# Patient Record
Sex: FEMALE | Race: WHITE | NOT HISPANIC OR LATINO | Employment: OTHER | URBAN - NONMETROPOLITAN AREA
[De-identification: names, ages, dates, MRNs, and addresses within clinical notes are randomized per-mention and may not be internally consistent; named-entity substitution may affect disease eponyms.]

---

## 2017-03-20 DIAGNOSIS — Z12.11 ENCOUNTER FOR SCREENING FOR MALIGNANT NEOPLASM OF COLON: ICD-10-CM

## 2017-05-01 ENCOUNTER — APPOINTMENT (OUTPATIENT)
Dept: LAB | Facility: MEDICAL CENTER | Age: 57
End: 2017-05-01
Payer: COMMERCIAL

## 2017-05-01 ENCOUNTER — TRANSCRIBE ORDERS (OUTPATIENT)
Dept: RADIOLOGY | Facility: MEDICAL CENTER | Age: 57
End: 2017-05-01

## 2017-05-01 DIAGNOSIS — E78.5 HYPERLIPIDEMIA: ICD-10-CM

## 2017-05-01 DIAGNOSIS — F41.1 GENERALIZED ANXIETY DISORDER: ICD-10-CM

## 2017-05-01 LAB
ALBUMIN SERPL BCP-MCNC: 3.7 G/DL (ref 3.5–5)
ALP SERPL-CCNC: 102 U/L (ref 46–116)
ALT SERPL W P-5'-P-CCNC: 21 U/L (ref 12–78)
ANION GAP SERPL CALCULATED.3IONS-SCNC: 8 MMOL/L (ref 4–13)
AST SERPL W P-5'-P-CCNC: 23 U/L (ref 5–45)
BASOPHILS # BLD AUTO: 0.03 THOUSANDS/ΜL (ref 0–0.1)
BASOPHILS NFR BLD AUTO: 1 % (ref 0–1)
BILIRUB SERPL-MCNC: 0.49 MG/DL (ref 0.2–1)
BUN SERPL-MCNC: 16 MG/DL (ref 5–25)
CALCIUM SERPL-MCNC: 9.3 MG/DL (ref 8.3–10.1)
CHLORIDE SERPL-SCNC: 108 MMOL/L (ref 100–108)
CHOLEST SERPL-MCNC: 228 MG/DL (ref 50–200)
CO2 SERPL-SCNC: 26 MMOL/L (ref 21–32)
CREAT SERPL-MCNC: 0.71 MG/DL (ref 0.6–1.3)
EOSINOPHIL # BLD AUTO: 0.13 THOUSAND/ΜL (ref 0–0.61)
EOSINOPHIL NFR BLD AUTO: 2 % (ref 0–6)
ERYTHROCYTE [DISTWIDTH] IN BLOOD BY AUTOMATED COUNT: 12.9 % (ref 11.6–15.1)
GFR SERPL CREATININE-BSD FRML MDRD: >60 ML/MIN/1.73SQ M
GLUCOSE P FAST SERPL-MCNC: 93 MG/DL (ref 65–99)
HCT VFR BLD AUTO: 43.8 % (ref 34.8–46.1)
HDLC SERPL-MCNC: 67 MG/DL (ref 40–60)
HGB BLD-MCNC: 14.9 G/DL (ref 11.5–15.4)
LDLC SERPL CALC-MCNC: 142 MG/DL (ref 0–100)
LYMPHOCYTES # BLD AUTO: 1.93 THOUSANDS/ΜL (ref 0.6–4.47)
LYMPHOCYTES NFR BLD AUTO: 32 % (ref 14–44)
MCH RBC QN AUTO: 31.2 PG (ref 26.8–34.3)
MCHC RBC AUTO-ENTMCNC: 34 G/DL (ref 31.4–37.4)
MCV RBC AUTO: 92 FL (ref 82–98)
MONOCYTES # BLD AUTO: 0.6 THOUSAND/ΜL (ref 0.17–1.22)
MONOCYTES NFR BLD AUTO: 10 % (ref 4–12)
NEUTROPHILS # BLD AUTO: 3.25 THOUSANDS/ΜL (ref 1.85–7.62)
NEUTS SEG NFR BLD AUTO: 55 % (ref 43–75)
NRBC BLD AUTO-RTO: 0 /100 WBCS
PLATELET # BLD AUTO: 309 THOUSANDS/UL (ref 149–390)
PMV BLD AUTO: 9.8 FL (ref 8.9–12.7)
POTASSIUM SERPL-SCNC: 4 MMOL/L (ref 3.5–5.3)
PROT SERPL-MCNC: 7.7 G/DL (ref 6.4–8.2)
RBC # BLD AUTO: 4.77 MILLION/UL (ref 3.81–5.12)
SODIUM SERPL-SCNC: 142 MMOL/L (ref 136–145)
TRIGL SERPL-MCNC: 95 MG/DL
TSH SERPL DL<=0.05 MIU/L-ACNC: 1.62 UIU/ML (ref 0.36–3.74)
WBC # BLD AUTO: 5.96 THOUSAND/UL (ref 4.31–10.16)

## 2017-05-01 PROCEDURE — 85025 COMPLETE CBC W/AUTO DIFF WBC: CPT

## 2017-05-01 PROCEDURE — 36415 COLL VENOUS BLD VENIPUNCTURE: CPT

## 2017-05-01 PROCEDURE — 84443 ASSAY THYROID STIM HORMONE: CPT

## 2017-05-01 PROCEDURE — 80053 COMPREHEN METABOLIC PANEL: CPT

## 2017-05-01 PROCEDURE — 80061 LIPID PANEL: CPT

## 2017-05-02 ENCOUNTER — GENERIC CONVERSION - ENCOUNTER (OUTPATIENT)
Dept: OTHER | Facility: OTHER | Age: 57
End: 2017-05-02

## 2017-05-22 ENCOUNTER — ALLSCRIPTS OFFICE VISIT (OUTPATIENT)
Dept: OTHER | Facility: OTHER | Age: 57
End: 2017-05-22

## 2017-08-14 ENCOUNTER — ALLSCRIPTS OFFICE VISIT (OUTPATIENT)
Dept: OTHER | Facility: OTHER | Age: 57
End: 2017-08-14

## 2017-08-14 DIAGNOSIS — R35.0 FREQUENCY OF MICTURITION: ICD-10-CM

## 2017-08-14 DIAGNOSIS — Z12.31 ENCOUNTER FOR SCREENING MAMMOGRAM FOR MALIGNANT NEOPLASM OF BREAST: ICD-10-CM

## 2017-08-14 DIAGNOSIS — N39.3 STRESS INCONTINENCE: ICD-10-CM

## 2017-08-15 ENCOUNTER — APPOINTMENT (OUTPATIENT)
Dept: LAB | Facility: MEDICAL CENTER | Age: 57
End: 2017-08-15
Payer: COMMERCIAL

## 2017-08-15 ENCOUNTER — GENERIC CONVERSION - ENCOUNTER (OUTPATIENT)
Dept: OTHER | Facility: OTHER | Age: 57
End: 2017-08-15

## 2017-08-15 ENCOUNTER — TRANSCRIBE ORDERS (OUTPATIENT)
Dept: LAB | Facility: MEDICAL CENTER | Age: 57
End: 2017-08-15

## 2017-08-15 DIAGNOSIS — N39.3 STRESS INCONTINENCE: ICD-10-CM

## 2017-08-15 DIAGNOSIS — R35.0 FREQUENCY OF MICTURITION: ICD-10-CM

## 2017-08-15 LAB
BACTERIA UR QL AUTO: ABNORMAL /HPF
BILIRUB UR QL STRIP: NEGATIVE
CAOX CRY URNS QL MICRO: ABNORMAL /HPF
CLARITY UR: CLEAR
COLOR UR: YELLOW
GLUCOSE UR STRIP-MCNC: NEGATIVE MG/DL
HGB UR QL STRIP.AUTO: NEGATIVE
KETONES UR STRIP-MCNC: NEGATIVE MG/DL
LEUKOCYTE ESTERASE UR QL STRIP: NEGATIVE
NITRITE UR QL STRIP: NEGATIVE
NON-SQ EPI CELLS URNS QL MICRO: ABNORMAL /HPF
PH UR STRIP.AUTO: 5.5 [PH] (ref 4.5–8)
PROT UR STRIP-MCNC: NEGATIVE MG/DL
RBC #/AREA URNS AUTO: ABNORMAL /HPF
SP GR UR STRIP.AUTO: 1.03 (ref 1–1.03)
UROBILINOGEN UR QL STRIP.AUTO: 0.2 E.U./DL
WBC #/AREA URNS AUTO: ABNORMAL /HPF

## 2017-08-15 PROCEDURE — 81001 URINALYSIS AUTO W/SCOPE: CPT

## 2017-08-15 PROCEDURE — 87086 URINE CULTURE/COLONY COUNT: CPT

## 2017-08-16 LAB — BACTERIA UR CULT: NORMAL

## 2017-08-18 ENCOUNTER — GENERIC CONVERSION - ENCOUNTER (OUTPATIENT)
Dept: OTHER | Facility: OTHER | Age: 57
End: 2017-08-18

## 2017-08-25 ENCOUNTER — HOSPITAL ENCOUNTER (OUTPATIENT)
Dept: MAMMOGRAPHY | Facility: HOSPITAL | Age: 57
Discharge: HOME | End: 2017-08-25
Attending: OBSTETRICS & GYNECOLOGY
Payer: COMMERCIAL

## 2017-08-25 DIAGNOSIS — Z12.31 ENCOUNTER FOR SCREENING MAMMOGRAM FOR MALIGNANT NEOPLASM OF BREAST: ICD-10-CM

## 2017-08-25 PROCEDURE — 77063 BREAST TOMOSYNTHESIS BI: CPT

## 2017-08-25 PROCEDURE — G0202 SCR MAMMO BI INCL CAD: HCPCS

## 2017-11-06 ENCOUNTER — ALLSCRIPTS OFFICE VISIT (OUTPATIENT)
Dept: OTHER | Facility: OTHER | Age: 57
End: 2017-11-06

## 2017-11-07 NOTE — PROGRESS NOTES
Assessment  1  Never a smoker   2  Hyperlipidemia (272 4) (E78 5)   3  Generalized anxiety disorder (300 02) (F41 1)    Plan  Generalized anxiety disorder, Hyperlipidemia    · Follow-up visit in 6 months Evaluation and Treatment  Follow-up  Status: Hold For -  Scheduling  Requested for: [id number]   · (1) CBC/PLT/DIFF; Status:Active; Requested for:06Mar2018;    · (1) COMPREHENSIVE METABOLIC PANEL; Status:Active; Requested for:06Mar2018;    · (1) LIPID PANEL, FASTING; Status:Active; Requested for:06Mar2018;    · (1) TSH; Status:Active; Requested for:06Mar2018;   Hyperlipidemia    · Lovastatin 40 MG Oral Tablet; TAKE 1 TABLET EVERY EVENING  Need for prophylactic vaccination and inoculation against influenza    · Fluzone Quadrivalent 0 5 ML Intramuscular Suspension Prefilled Syringe;  INJECT 0 5  ML Intramuscular; To Be Done: [id number]  Screening for colon cancer    · COLONOSCOPY; Status:Temporary Deferral - Pt requests deferral,pt is due and is going  to set up her appt;    11/6/2018    Discussion/Summary    Will get Flu shot today  Encouraged to exercise more  Blood work ordered prior to next visit  F/U in 6 months or PRN  Possible side effects of new medications were reviewed with the patient/guardian today  The treatment plan was reviewed with the patient/guardian  The patient/guardian understands and agrees with the treatment plan      Chief Complaint  Follow-up   Patient is here today for follow up of chronic conditions described in HPI  History of Present Illness  Follow-up  Patient denies any chest pain or shortness of breath  The patient is being seen for follow-up of generalized anxiety disorder  The patient reports doing well  She has no comorbid illnesses  She has had no significant interval events  The patient is currently asymptomatic  The patient is not currently on medication for this problem  The patient states her hyperlipidemia has been stable since the last visit   She has no comorbid illnesses  She has no significant interval events  Symptoms: The patient is currently asymptomatic  Medications: the patient is adherent with her medication regimen  -- She denies medication side effects  Review of Systems    Constitutional: No fever, no chills, feels well, no tiredness, no recent weight gain or weight loss  Cardiovascular: No complaints of slow heart rate, no fast heart rate, no chest pain, no palpitations, no leg claudication, no lower extremity edema  Respiratory: No complaints of shortness of breath, no wheezing, no cough, no SOB on exertion, no orthopnea, no PND  Gastrointestinal: No complaints of abdominal pain, no constipation, no nausea or vomiting, no diarrhea, no bloody stools  Genitourinary: No complaints of dysuria, no incontinence, no pelvic pain, no dysmenorrhea, no vaginal discharge or bleeding  Active Problems  1  Acute upper respiratory infection (465 9) (J06 9)   2  Bladder stones (594 1) (N21 0)   3  Encounter for routine gynecological examination (V72 31) (Z01 419)   4  Encounter for screening mammogram for malignant neoplasm of breast (V76 12)   (Z12 31)   5  Fatigue (780 79) (R53 83)   6  Generalized anxiety disorder (300 02) (F41 1)   7  Hyperlipidemia (272 4) (E78 5)   8  Impacted cerumen of right ear (380 4) (H61 21)   9  Increased BMI (783 9) (R63 8)   10  Left flank pain (789 09) (R10 9)   11  Need for prophylactic vaccination and inoculation against influenza (V04 81) (Z23)   12  Post-menopausal bleeding (627 1) (N95 0)   13  Screening for colon cancer (V76 51) (Z12 11)   14  Screening for depression (V79 0) (Z13 89)   15  Snoring (786 09) (R06 83)   16  Ureteral calculus, right (592 1) (N20 1)   17  Urinary frequency (788 41) (R35 0)   18  Urinary tract infection (599 0) (N39 0)   19  Urinary, incontinence, stress female (625 6) (N39 3)   20  Visit for routine gyn exam (V72 31) (Z01 419)    Past Medical History  1   Acute upper respiratory infection (465 9) (J06 9)   2  Acute upper respiratory infection (465 9) (J06 9)   3  History of Dysuria (788 1) (R30 0)   4  History of Encounter for screening mammogram for malignant neoplasm of breast   (V76 12) (Z12 31)   5  History of acute sinusitis (V12 69) (Z87 09)    The active problems and past medical history were reviewed and updated today  Surgical History  1  History of Gallbladder Surgery    The surgical history was reviewed and updated today  Family History  Mother    1  Family history of Stroke Syndrome (436)  Maternal Grandmother    2  Family history of malignant neoplasm (V16 9) (Z80 9)    The family history was reviewed and updated today  Social History   · Being A Social Drinker   ·    · Never a smoker  The social history was reviewed and updated today  The social history was reviewed and is unchanged  Current Meds   1  Lovastatin 40 MG Oral Tablet; TAKE 1 TABLET EVERY EVENING; Therapy: 10YSG9836 to (Evaluate:34Ghq1129)  Requested for: 99HBF3437; Last   Rx:03Oct2016 Ordered   2  Vitamin C TABS; Therapy: (Recorded:12Oct2015) to Recorded    The medication list was reviewed and updated today  Allergies  1  Penicillins    Vitals  Vital Signs    Recorded: 16JXJ4140 17:49QX   Systolic 515   Diastolic 80   Height 5 ft 1 in   Weight 158 lb    BMI Calculated 29 85   BSA Calculated 1 71     Physical Exam    Constitutional   General appearance: No acute distress, well appearing and well nourished  Pulmonary   Respiratory effort: No increased work of breathing or signs of respiratory distress  Auscultation of lungs: Clear to auscultation  Cardiovascular   Auscultation of heart: Normal rate and rhythm, normal S1 and S2, without murmurs      Examination of extremities for edema and/or varicosities: Normal     Psychiatric   Orientation to person, place, and time: Normal     Mood and affect: Normal          Signatures   Electronically signed by : Kumar Rivera DO; Nov 6 2017  9:26AM EST                       (Author)

## 2018-01-10 NOTE — RESULT NOTES
Verified Results  (1) COMPREHENSIVE METABOLIC PANEL 17HVZ9582 32:02XP [employer] Order Number: [id number]     Test Name Result Flag Reference   SODIUM 142 mmol/L  136-145   POTASSIUM 4 0 mmol/L  3 5-5 3   CHLORIDE 108 mmol/L  100-108   CARBON DIOXIDE 26 mmol/L  21-32   ANION GAP (CALC) 8 mmol/L  4-13   BLOOD UREA NITROGEN 16 mg/dL  5-25   CREATININE 0 71 mg/dL  0 60-1 30   Standardized to IDMS reference method   CALCIUM 9 3 mg/dL  8 3-10 1   BILI, TOTAL 0 49 mg/dL  0 20-1 00   ALK PHOSPHATAS 102 U/L     ALT (SGPT) 21 U/L  12-78   AST(SGOT) 23 U/L  5-45   ALBUMIN 3 7 g/dL  3 5-5 0   TOTAL PROTEIN 7 7 g/dL  6 4-8 2   eGFR Non-African American      >60 0 ml/min/1 73sq m   [facility] Disease Education Program recommendations are as follows:  GFR calculation is accurate only with a steady state creatinine  Chronic Kidney disease less than 60 ml/min/1 73 sq  meters  Kidney failure less than 15 ml/min/1 73 sq  meters  GLUCOSE FASTING 93 mg/dL  65-99     (1) CBC/PLT/DIFF 14OUH1678 09:59AM [employer] Order Number: [id number]     Test Name Result Flag Reference   WBC COUNT 5 96 Thousand/uL  4 31-10 16   RBC COUNT 4 77 Million/uL  3 81-5 12   HEMOGLOBIN 14 9 g/dL  11 5-15 4   HEMATOCRIT 43 8 %  34 8-46  1   MCV 92 fL  82-98   MCH 31 2 pg  26 8-34 3   MCHC 34 0 g/dL  31 4-37 4   RDW 12 9 %  11 6-15 1   MPV 9 8 fL  8 9-12 7   PLATELET COUNT 479 Thousands/uL  149-390   nRBC AUTOMATED 0 /100 WBCs     NEUTROPHILS RELATIVE PERCENT 55 %  43-75   LYMPHOCYTES RELATIVE PERCENT 32 %  14-44   MONOCYTES RELATIVE PERCENT 10 %  4-12   EOSINOPHILS RELATIVE PERCENT 2 %  0-6   BASOPHILS RELATIVE PERCENT 1 %  0-1   NEUTROPHILS ABSOLUTE COUNT 3 25 Thousands/? ??L  1 85-7 62   LYMPHOCYTES ABSOLUTE COUNT 1 93 Thousands/? ??L  0 60-4 47   MONOCYTES ABSOLUTE COUNT 0 60 Thousand/? ??L  0 17-1 22   EOSINOPHILS ABSOLUTE COUNT 0 13 Thousand/? ??L  0 00-0 61   BASOPHILS ABSOLUTE COUNT 0 03 Thousands/? ? ? L 0  00-0 10   - Patient Instructions: This bloodwork is non-fasting  Please drink two glasses of water morning of bloodwork  (1) LIPID PANEL, FASTING [id number] 09:59AM Silvano Montoya Order Number: [id number]     Test Name Result Flag Reference   CHOLESTEROL 228 mg/dL H    HDL,DIRECT 67 mg/dL H 40-60   Specimen collection should occur prior to Metamizole administration due to the potential for falsely depressed results  LDL CHOLESTEROL CALCULATED 142 mg/dL H 0-100   - Patient Instructions: This is a fasting blood test  Water,black tea or black  coffee only after 9:00pm the night before test   Drink 2 glasses of water the morning of test       Triglyceride:         Normal              <150 mg/dl       Borderline High    150-199 mg/dl       High               200-499 mg/dl       Very High          >499 mg/dl  Cholesterol:         Desirable        <200 mg/dl      Borderline High  200-239 mg/dl      High             >239 mg/dl  HDL Cholesterol:        High    >59 mg/dL      Low     <41 mg/dL  LDL CALCULATED:    This screening LDL is a calculated result  It does not have the accuracy of the Direct Measured LDL in the monitoring of patients with hyperlipidemia and/or statin therapy  Direct Measure LDL (QPX584) must be ordered separately in these patients  TRIGLYCERIDES 95 mg/dL  <=150   Specimen collection should occur prior to N-Acetylcysteine or Metamizole administration due to the potential for falsely depressed results  (1) TSH 11WML8464 09:59AM Silvano Montoya Order Number: [id number]     Test Name Result Flag Reference   TSH 1 620 uIU/mL  0 358-3 740   - Patient Instructions: This bloodwork is non-fasting  Please drink two glasses of water morning of bloodwork  Patients undergoing fluorescein dye angiography may retain small amounts of fluorescein in the body for 48-72 hours post procedure   Samples containing fluorescein can produce falsely depressed TSH values  If the patient had this procedure,a specimen should be resubmitted post fluorescein clearance            The recommended reference ranges for TSH during pregnancy are as follows:  First trimester 0 1 to 2 5 uIU/mL  Second trimester  0 2 to 3 0 uIU/mL  Third trimester 0 3 to 3 0 uIU/m

## 2018-01-11 NOTE — PROGRESS NOTES
Assessment    1  Encounter for preventive health examination (V70 0) (Z00 00)    Plan  Acute upper respiratory infection    · Azithromycin 250 MG Oral Tablet; TAKE 2 TABLETS ON DAY 1 THEN TAKE 1  TABLET A DAY FOR 4 DAYS  Hyperlipidemia    · Follow-up visit in 6 months Evaluation and Treatment  Follow-up  Status: Hold For -  Scheduling  Requested for: 81KBJ5785    Discussion/Summary  health maintenance visit Currently, she eats a healthy diet  Breast cancer screening: mammogram is current  Colorectal cancer screening: colorectal cancer screening is current  Advice and education were given regarding nutrition and aerobic exercise  Patient discussion: discussed with the patient  Continue same medications  Follow-up in 6 months or when necessary  Reviewed her blood work  Chief Complaint  Well visit      History of Present Illness  HM, Adult Female: The patient is being seen for a health maintenance evaluation  General Health: The patient's health since the last visit is described as good  She has regular dental visits  She complains of vision problems  Vision care includes wearing glasses  She denies hearing loss  Immunizations status: up to date  Lifestyle:  She consumes a diverse and healthy diet  She does not have any weight concerns  She exercises regularly  She does not use tobacco  She denies alcohol use  She denies drug use  Screening:   HPI: Well visit  Denies any chest pain shortness of breath  Review of Systems    Constitutional: No fever, no chills, feels well, no tiredness, no recent weight gain or weight loss  Cardiovascular: No complaints of slow heart rate, no fast heart rate, no chest pain, no palpitations, no leg claudication, no lower extremity edema  Respiratory: No complaints of shortness of breath, no wheezing, no cough, no SOB on exertion, no orthopnea, no PND     Gastrointestinal: No complaints of abdominal pain, no constipation, no nausea or vomiting, no diarrhea, no bloody stools  Genitourinary: No complaints of dysuria, no incontinence, no pelvic pain, no dysmenorrhea, no vaginal discharge or bleeding  Active Problems    1  Acute upper respiratory infection (465 9) (J06 9)   2  Bladder stones (594 1) (N21 0)   3  Generalized anxiety disorder (300 02) (F41 1)   4  Hyperlipidemia (272 4) (E78 5)   5  Impacted cerumen of right ear (380 4) (H61 21)   6  Need for prophylactic vaccination and inoculation against influenza (V04 81) (Z23)   7  Post-menopausal bleeding (627 1) (N95 0)   8  Screening for depression (V79 0) (Z13 89)   9  Ureteral calculus, right (592 1) (N20 1)   10  Urinary tract infection (599 0) (N39 0)   11  Urinary, incontinence, stress female (625 6) (N39 3)   12  Visit for routine gyn exam (V72 31) (Z01 419)    Past Medical History    · Acute upper respiratory infection (465 9) (J06 9)   · Acute upper respiratory infection (465 9) (J06 9)   · History of Dysuria (788 1) (R30 0)   · History of Encounter for routine gynecological examination (V72 31) (Z01 419)   · History of Encounter for screening mammogram for malignant neoplasm of breast  (V76 12) (Z12 31)   · History of acute sinusitis (V12 69) (Z87 09)    Surgical History    · History of Gallbladder Surgery    Family History  Mother    · Family history of Stroke Syndrome (56)  Maternal Grandmother    · Family history of malignant neoplasm (V16 9) (Z80 9)    Social History    · Being A Social Drinker   ·    · Never A Smoker    Current Meds   1  Lovastatin 40 MG Oral Tablet; TAKE 1 TABLET EVERY EVENING; Therapy: 16SIC7681 to (Evaluate:82Qgd8373)  Requested for: 23Zsg5273; Last   Rx:13Zbp7415 Ordered   2  Vitamin C TABS; Therapy: (Recorded:12Oct2015) to Recorded    Allergies    1   Penicillins    Vitals   Recorded: 09RCJ0745 59:57GH   Systolic 168, LUE, Sitting   Diastolic 70, LUE, Sitting   Height 5 ft 1 in   Weight 148 lb    BMI Calculated 27 96   BSA Calculated 1 66     Physical Exam    Constitutional   General appearance: No acute distress, well appearing and well nourished  Pulmonary   Respiratory effort: No increased work of breathing or signs of respiratory distress  Auscultation of lungs: Clear to auscultation  Cardiovascular   Auscultation of heart: Normal rate and rhythm, normal S1 and S2, no murmurs      Examination of extremities for edema and/or varicosities: Normal     Psychiatric   Orientation to person, place, and time: Normal     Mood and affect: Normal        Signatures   Electronically signed by : Yamileth Grimm DO; May 10 2016  9:32AM EST                       (Author)

## 2018-01-12 VITALS
BODY MASS INDEX: 29.83 KG/M2 | HEIGHT: 61 IN | DIASTOLIC BLOOD PRESSURE: 80 MMHG | WEIGHT: 158 LBS | SYSTOLIC BLOOD PRESSURE: 132 MMHG

## 2018-01-12 NOTE — RESULT NOTES
Verified Results  (1) URINALYSIS WITH MICROSCOPIC 77Igv4450 09:42AM Abraham Anderson   TW Order Number: [id number]     Test Name Result Flag Reference   COLOR Yellow     CLARITY Clear     PH UA 5 5  4 5-8 0   LEUKOCYTE ESTERASE UA Negative  Negative   NITRITE UA Negative  Negative   PROTEIN UA Negative mg/dl  Negative   GLUCOSE UA Negative mg/dl  Negative   KETONES UA Negative mg/dl  Negative   UROBILINOGEN UA 0 2 E U /dl  0 2, 1 0 E U /dl   BILIRUBIN UA Negative  Negative   BLOOD UA Negative  Negative   SPECIFIC GRAVITY UA 1 026  1 003-1 030   WBC UA None Seen /hpf  None Seen   RBC UA None Seen /hpf  None Seen   BACTERIA None Seen /hpf  None Seen, Occasional   CALCIUM OXALATE CRYSTALS /HPF IN URINE SEDIMENT BY MICROSCOPY Occasional /hpf A None Seen   EPITHELIAL CELLS None Seen /hpf  None Seen, Occasional     (1) URINE CULTURE [id number] 09:42AM Robsonrodrick [facility]ons Order Number: [id number]     Test Name Result Flag Reference   CLINICAL REPORT (Report)     Test:        Urine culture  Specimen Type:   Urine  Specimen Date:   8/15/2017 9:42 AM  Result Date:    8/16/2017 1:07 PM  Result Status:   Final result  Resulting Lab:    [address]            Tel: [phone]      CULTURE                                       ------------------                                   No Growth <1000 cfu/mL

## 2018-01-12 NOTE — RESULT NOTES
Verified Results  CT RENAL STONE STUDY ABDOMEN PELVIS WO CONTRAST [id number] 01:55PM Regine Norton Order Number: [id number]   Performing Comments: Kidney stone protocol   - Patient Instructions: To schedule this appointment, please contact Central Scheduling at [phone]   Order Number: [id number]   Performing Comments: Kidney stone protocol   - Patient Instructions: To schedule this appointment, please contact Central Scheduling at [phone]  Test Name Result Flag Reference   CT RENAL STONE STUDY ABDOMEN PELVIS WO CONTRAST (Report)     CT ABDOMEN AND PELVIS WITHOUT IV CONTRAST - LOW DOSE RENAL STONE      INDICATION: Left-sided pain this morning, history of kidney stone      COMPARISON: CT abdomen and pelvis 9/8/2015     TECHNIQUE: Low dose thin section CT examination of the abdomen and pelvis was performed without intravenous or oral contrast according to a protocol specifically designed to evaluate for urinary tract calculus  Axial, sagittal and coronal reformatted    images were submitted for interpretation  This examination, like all CT scans performed in the [facility], was performed utilizing techniques to minimize radiation dose exposure, including the use of iterative reconstruction and    automated exposure control  Evaluation for pathology in the abdomen and pelvis that is unrelated to urinary tract calculi is limited  FINDINGS:     RIGHT KIDNEY AND URETER:   No urinary tract calculi  No hydronephrosis or hydroureter  No perinephric collection  LEFT KIDNEY AND URETER:   No urinary tract calculi  No hydronephrosis or hydroureter  No perinephric collection  URINARY BLADDER:   Unremarkable  There is bibasilar dependent atelectasis  Limited low radiation dose noncontrast CT evaluation demonstrates no clinically significant abnormality of liver, spleen, pancreas, or adrenal glands     No calcified gallstones or gallbladder wall thickening noted    No bowel obstruction  No ascites or lymphadenopathy  Mild stool retention  Limited evaluation demonstrates no evidence to suggest acute appendicitis  No acute fracture or destructive osseous lesion is identified  IMPRESSION:     No obstructive uropathy           Workstation performed: [id number]     Signed by:   Ramone Ghosh MD   7/21/16

## 2018-01-13 VITALS
BODY MASS INDEX: 34.55 KG/M2 | DIASTOLIC BLOOD PRESSURE: 84 MMHG | WEIGHT: 183 LBS | HEIGHT: 61 IN | SYSTOLIC BLOOD PRESSURE: 120 MMHG

## 2018-01-14 VITALS
SYSTOLIC BLOOD PRESSURE: 118 MMHG | DIASTOLIC BLOOD PRESSURE: 80 MMHG | HEIGHT: 61 IN | WEIGHT: 152.44 LBS | BODY MASS INDEX: 28.78 KG/M2

## 2018-01-16 NOTE — RESULT NOTES
Verified Results  (1) CBC/PLT/DIFF [id number] 08:04AM Albertina Mondragon     Test Name Result Flag Reference   WBC COUNT 7 17 Thousand/uL  4 31-10 16   RBC COUNT 5 09 Million/uL  3 81-5 12   HEMOGLOBIN 15 4 g/dL  11 5-15 4   HEMATOCRIT 45 6 %  34 8-46  1   MCV 90 fL  82-98   MCH 30 3 pg  26 8-34 3   MCHC 33 8 g/dL  31 4-37 4   RDW 13 1 %  11 6-15 1   MPV 9 5 fL  8 9-12 7   PLATELET COUNT 309 Thousands/uL  149-390   NEUTROPHILS RELATIVE PERCENT 47 %  43-75   LYMPHOCYTES RELATIVE PERCENT 38 %  14-44   MONOCYTES RELATIVE PERCENT 10 %  4-12   EOSINOPHILS RELATIVE PERCENT 4 %  0-6   BASOPHILS RELATIVE PERCENT 1 %  0-1   NEUTROPHILS ABSOLUTE COUNT 3 40 Thousands/?L  1 85-7 62   LYMPHOCYTES ABSOLUTE COUNT 2 75 Thousands/?L  0 60-4 47   MONOCYTES ABSOLUTE COUNT 0 71 Thousand/?L  0 17-1 22   EOSINOPHILS ABSOLUTE COUNT 0 27 Thousand/?L  0 00-0 61   BASOPHILS ABSOLUTE COUNT 0 04 Thousands/?L  0 00-0 10     (1) COMPREHENSIVE METABOLIC PANEL [id number] 04:32FT Albertina Mondragon     Test Name Result Flag Reference   GLUCOSE,RANDM 89 mg/dL     If the patient is fasting, the ADA then defines impaired fasting glucose as > 100 mg/dL and diabetes as > or equal to 123 mg/dL     SODIUM 141 mmol/L  136-145   POTASSIUM 4 6 mmol/L  3 5-5 3   CHLORIDE 104 mmol/L  100-108   CARBON DIOXIDE 27 mmol/L  21-32   ANION GAP (CALC) 10 mmol/L  4-13   BLOOD UREA NITROGEN 19 mg/dL  5-25   CREATININE 0 70 mg/dL  0 60-1 30   Standardized to IDMS reference method   CALCIUM 9 8 mg/dL  8 3-10 1   BILI, TOTAL 0 50 mg/dL  0 20-1 00   ALK PHOSPHATAS 116 U/L     ALT (SGPT) 29 U/L  12-78   AST(SGOT) 24 U/L  5-45   ALBUMIN 4 0 g/dL  3 5-5 0   TOTAL PROTEIN 7 7 g/dL  6 4-8 2   eGFR Non-African American      >60 0 ml/min/1 73sq [facility] Disease Education Program recommendations are as follows:  GFR calculation is accurate only with a steady state creatinine  Chronic Kidney disease less than 60 ml/min/1 73 sq  meters  Kidney failure less than 15 ml/min/1 73 sq  meters  (1) LIPID PANEL, FASTING [id number] 08:04AM [employer]     Test Name Result Flag Reference   CHOLESTEROL 216 mg/dL H    HDL,DIRECT 60 mg/dL  40-60   Specimen collection should occur prior to Metamizole administration due to the potential for falsely depressed results  LDL CHOLESTEROL CALCULATED 130 mg/dL H 0-100   Triglyceride:         Normal              <150 mg/dl       Borderline High    150-199 mg/dl       High               200-499 mg/dl       Very High          >499 mg/dl  Cholesterol:         Desirable        <200 mg/dl      Borderline High  200-239 mg/dl      High             >239 mg/dl  HDL Cholesterol:        High    >59 mg/dL      Low     <41 mg/dL  LDL CALCULATED:    This screening LDL is a calculated result  It does not have the accuracy of the Direct Measured LDL in the monitoring of patients with hyperlipidemia and/or statin therapy  Direct Measure LDL (UXM728) must be ordered separately in these patients  TRIGLYCERIDES 132 mg/dL  <=150   Specimen collection should occur prior to N-Acetylcysteine or Metamizole administration due to the potential for falsely depressed results  (1) TSH [id number] 08:04AM [employer]     Test Name Result Flag Reference   TSH 2 668 uIU/mL  0 358-3 740   Patients undergoing fluorescein dye angiography may retain small amounts of fluorescein in the body for 48-72 hours post procedure  Samples containing fluorescein can produce falsely depressed TSH values  If the patient had this procedure,a specimen should be resubmitted post fluorescein clearance          The recommended reference ranges for TSH during pregnancy are as follows:  First trimester 0 1 to 2 5 uIU/mL  Second trimester  0 2 to 3 0 uIU/mL  Third trimester 0 3 to 3 0 uIU/m

## 2018-01-18 NOTE — PROGRESS NOTES
Assessment    1  Impacted cerumen of right ear (380 4) (H61 21)    Plan  Impacted cerumen of right ear    · Neomycin-Polymyxin-HC 3 [id number] Otic Suspension; INSTILL 3 DROPS IN  AFFECTED EAR(S) 3-4 TIMES DAILY   · Follow Up if Not Better Evaluation and Treatment  Follow-up  Status: Complete  Done:  [id number]    Discussion/Summary    Cerumen was successfully removed from the right ear  Rx for ear drops  Call if symptoms continue or increase  Possible side effects of new medications were reviewed with the patient/guardian today  The treatment plan was reviewed with the patient/guardian  The patient/guardian understands and agrees with the treatment plan      Chief Complaint    1  Hearing Loss  R ear blocked      History of Present Illness  HPI: R ear blocked for the past couple days  Lost her hearing early this AM  Pops open off and on  Has PND  No F/C  Hearing Loss:   Jennifer Painter presents with complaints of constant episodes of moderate right ear hearing loss  Episodes started about 2 days ago  She is currently experiencing hearing loss  Symptoms are worsening  Associated symptoms include ear fullness, but no tinnitus, no otalgia, no drainage from ear, no dizziness, no lightheadedness and no vertigo  Review of Systems    Constitutional: No fever, no chills, feels well, no tiredness, no recent weight gain or loss  ENT: as noted in HPI  Cardiovascular: no complaints of slow or fast heart rate, no chest pain, no palpitations, no leg claudication or lower extremity edema  Respiratory: no complaints of shortness of breath, no wheezing, no dyspnea on exertion, no orthopnea or PND  Gastrointestinal: no complaints of abdominal pain, no constipation, no nausea or diarrhea, no vomiting, no bloody stools  Genitourinary: no complaints of dysuria, no incontinence, no pelvic pain, no dysmenorrhea, no vaginal discharge or abnormal vaginal bleeding  Active Problems    1   Bladder stones (594 1) (N21 0)   2  Generalized anxiety disorder (300 02) (F41 1)   3  Hyperlipidemia (272 4) (E78 5)   4  Need for prophylactic vaccination and inoculation against influenza (V04 81) (Z23)   5  Post-menopausal bleeding (627 1) (N95 0)   6  Screening for depression (V79 0) (Z13 89)   7  Ureteral calculus, right (592 1) (N20 1)   8  Urinary tract infection (599 0) (N39 0)   9  Urinary, incontinence, stress female (625 6) (N39 3)   10  Visit for routine gyn exam (V72 31) (Z01 419)    Past Medical History    1  Acute upper respiratory infection (465 9) (J06 9)   2  Acute upper respiratory infection (465 9) (J06 9)   3  Acute upper respiratory infection (465 9) (J06 9)   4  History of Dysuria (788 1) (R30 0)   5  History of Encounter for routine gynecological examination (V72 31) (Z01 419)   6  History of Encounter for screening mammogram for malignant neoplasm of breast   (V76 12) (Z12 31)   7  History of acute sinusitis (V12 69) (Z87 09)  Active Problems And Past Medical History Reviewed: The active problems and past medical history were reviewed and updated today  Family History    1  Family history of Stroke Syndrome (436)    2  Family history of malignant neoplasm (V16 9) (Z80 9)  Family History Reviewed: The family history was reviewed and updated today  Social History    · Being A Social Drinker   ·    · Never A Smoker  The social history was reviewed and updated today  The social history was reviewed and is unchanged  Surgical History    1  History of Gallbladder Surgery  Surgical History Reviewed: The surgical history was reviewed and updated today  Current Meds   1  Lovastatin 40 MG Oral Tablet; TAKE 1 TABLET EVERY EVENING; Therapy: [id number] to (Evaluate:02Ttv8821)  Requested for: 40Dph4358; Last   Rx:[id number] Ordered   2  Vitamin C TABS; Therapy: (Recorded:12Oct2015) to Recorded    The medication list was reviewed and updated today  Allergies    1  Penicillins    Vitals   Recorded: 82TRJ4512 03:22PM   Blood Pressure 76 mm Hg, LUE, Sitting 124 mm Hg, LUE, Sitting   Height 5 ft 1 in    Weight 154 lb     BMI Calculated 29 1 kg/m2    BSA Calculated 1 69 m2      Physical Exam    Constitutional   General appearance: No acute distress, well appearing and well nourished  Ears, Nose, Mouth, and Throat   Otoscopic examination: Abnormal   The right tympanic membrane was normal  The left tympanic membrane was normal  The right external canal had a cerumen impaction  Psychiatric   Orientation to person, place, and time: Normal     Mood and affect: Normal          Procedure            Procedure: cerumen removal    Indication: cerumen impaction in the right ear  Prep: hydrogen peroxide was placed in the canal prior to the procedure  Procedure Note: The ear was cleaned by using warm water irrigation and a wire loop  The procedure was successful  Post-Procedure:   Patient Status: the patient tolerated the procedure well  Complications: there were no complications  Follow-up as needed              Future Appointments    Date/Time Provider Specialty Site   05/10/2016 09:00 AM Galen Jo DO [facility]     Signatures   Electronically signed by : Ash Finch DO; Feb  3 2016  3:58PM EST                       (Author)

## 2018-02-27 ENCOUNTER — TELEPHONE (OUTPATIENT)
Dept: FAMILY MEDICINE CLINIC | Facility: CLINIC | Age: 58
End: 2018-02-27

## 2018-02-27 DIAGNOSIS — J06.9 UPPER RESPIRATORY TRACT INFECTION, UNSPECIFIED TYPE: Primary | ICD-10-CM

## 2018-02-27 RX ORDER — AZITHROMYCIN 250 MG/1
TABLET, FILM COATED ORAL
Qty: 6 TABLET | Refills: 0 | Status: SHIPPED | OUTPATIENT
Start: 2018-02-27 | End: 2018-03-04

## 2018-03-06 DIAGNOSIS — F41.1 GENERALIZED ANXIETY DISORDER: ICD-10-CM

## 2018-03-06 DIAGNOSIS — E78.5 HYPERLIPIDEMIA: ICD-10-CM

## 2018-03-12 DIAGNOSIS — E78.5 HYPERLIPIDEMIA, UNSPECIFIED HYPERLIPIDEMIA TYPE: Primary | ICD-10-CM

## 2018-03-12 RX ORDER — LOVASTATIN 40 MG/1
TABLET ORAL
Qty: 90 TABLET | Refills: 3 | Status: SHIPPED | OUTPATIENT
Start: 2018-03-12 | End: 2019-03-20 | Stop reason: SDUPTHER

## 2018-03-12 RX ORDER — LOVASTATIN 40 MG/1
1 TABLET ORAL
COMMUNITY
Start: 2011-05-16 | End: 2018-03-12 | Stop reason: SDUPTHER

## 2018-03-12 RX ORDER — RIBOFLAVIN (VITAMIN B2) 100 MG
TABLET ORAL
COMMUNITY
End: 2019-07-16

## 2018-04-24 ENCOUNTER — APPOINTMENT (OUTPATIENT)
Dept: LAB | Facility: MEDICAL CENTER | Age: 58
End: 2018-04-24
Payer: COMMERCIAL

## 2018-04-24 ENCOUNTER — TRANSCRIBE ORDERS (OUTPATIENT)
Dept: LAB | Facility: MEDICAL CENTER | Age: 58
End: 2018-04-24

## 2018-04-24 DIAGNOSIS — E78.5 HYPERLIPIDEMIA: ICD-10-CM

## 2018-04-24 DIAGNOSIS — F41.1 GENERALIZED ANXIETY DISORDER: ICD-10-CM

## 2018-04-24 LAB
ALBUMIN SERPL BCP-MCNC: 4 G/DL (ref 3.5–5)
ALP SERPL-CCNC: 101 U/L (ref 46–116)
ALT SERPL W P-5'-P-CCNC: 18 U/L (ref 12–78)
ANION GAP SERPL CALCULATED.3IONS-SCNC: 6 MMOL/L (ref 4–13)
AST SERPL W P-5'-P-CCNC: 21 U/L (ref 5–45)
BASOPHILS # BLD AUTO: 0.04 THOUSANDS/ΜL (ref 0–0.1)
BASOPHILS NFR BLD AUTO: 1 % (ref 0–1)
BILIRUB SERPL-MCNC: 0.6 MG/DL (ref 0.2–1)
BUN SERPL-MCNC: 20 MG/DL (ref 5–25)
CALCIUM SERPL-MCNC: 9.4 MG/DL (ref 8.3–10.1)
CHLORIDE SERPL-SCNC: 107 MMOL/L (ref 100–108)
CHOLEST SERPL-MCNC: 194 MG/DL (ref 50–200)
CO2 SERPL-SCNC: 28 MMOL/L (ref 21–32)
CREAT SERPL-MCNC: 0.73 MG/DL (ref 0.6–1.3)
EOSINOPHIL # BLD AUTO: 0.24 THOUSAND/ΜL (ref 0–0.61)
EOSINOPHIL NFR BLD AUTO: 4 % (ref 0–6)
ERYTHROCYTE [DISTWIDTH] IN BLOOD BY AUTOMATED COUNT: 12.9 % (ref 11.6–15.1)
GFR SERPL CREATININE-BSD FRML MDRD: 90 ML/MIN/1.73SQ M
GLUCOSE P FAST SERPL-MCNC: 87 MG/DL (ref 65–99)
HCT VFR BLD AUTO: 46.1 % (ref 34.8–46.1)
HDLC SERPL-MCNC: 65 MG/DL (ref 40–60)
HGB BLD-MCNC: 15.2 G/DL (ref 11.5–15.4)
LDLC SERPL CALC-MCNC: 114 MG/DL (ref 0–100)
LYMPHOCYTES # BLD AUTO: 2.4 THOUSANDS/ΜL (ref 0.6–4.47)
LYMPHOCYTES NFR BLD AUTO: 41 % (ref 14–44)
MCH RBC QN AUTO: 30.8 PG (ref 26.8–34.3)
MCHC RBC AUTO-ENTMCNC: 33 G/DL (ref 31.4–37.4)
MCV RBC AUTO: 94 FL (ref 82–98)
MONOCYTES # BLD AUTO: 0.63 THOUSAND/ΜL (ref 0.17–1.22)
MONOCYTES NFR BLD AUTO: 11 % (ref 4–12)
NEUTROPHILS # BLD AUTO: 2.55 THOUSANDS/ΜL (ref 1.85–7.62)
NEUTS SEG NFR BLD AUTO: 43 % (ref 43–75)
NONHDLC SERPL-MCNC: 129 MG/DL
NRBC BLD AUTO-RTO: 0 /100 WBCS
PLATELET # BLD AUTO: 331 THOUSANDS/UL (ref 149–390)
PMV BLD AUTO: 9.6 FL (ref 8.9–12.7)
POTASSIUM SERPL-SCNC: 3.9 MMOL/L (ref 3.5–5.3)
PROT SERPL-MCNC: 8.1 G/DL (ref 6.4–8.2)
RBC # BLD AUTO: 4.93 MILLION/UL (ref 3.81–5.12)
SODIUM SERPL-SCNC: 141 MMOL/L (ref 136–145)
TRIGL SERPL-MCNC: 75 MG/DL
TSH SERPL DL<=0.05 MIU/L-ACNC: 2.54 UIU/ML (ref 0.36–3.74)
WBC # BLD AUTO: 5.87 THOUSAND/UL (ref 4.31–10.16)

## 2018-04-24 PROCEDURE — 36415 COLL VENOUS BLD VENIPUNCTURE: CPT

## 2018-04-24 PROCEDURE — 80061 LIPID PANEL: CPT

## 2018-04-24 PROCEDURE — 84443 ASSAY THYROID STIM HORMONE: CPT

## 2018-04-24 PROCEDURE — 80053 COMPREHEN METABOLIC PANEL: CPT

## 2018-04-24 PROCEDURE — 85025 COMPLETE CBC W/AUTO DIFF WBC: CPT

## 2018-05-14 ENCOUNTER — OFFICE VISIT (OUTPATIENT)
Dept: FAMILY MEDICINE CLINIC | Facility: CLINIC | Age: 58
End: 2018-05-14
Payer: COMMERCIAL

## 2018-05-14 VITALS
SYSTOLIC BLOOD PRESSURE: 122 MMHG | HEIGHT: 61 IN | WEIGHT: 153 LBS | DIASTOLIC BLOOD PRESSURE: 82 MMHG | BODY MASS INDEX: 28.89 KG/M2

## 2018-05-14 DIAGNOSIS — F41.1 GENERALIZED ANXIETY DISORDER: ICD-10-CM

## 2018-05-14 DIAGNOSIS — E78.49 OTHER HYPERLIPIDEMIA: ICD-10-CM

## 2018-05-14 DIAGNOSIS — J34.89 LESION OF NOSE: ICD-10-CM

## 2018-05-14 DIAGNOSIS — J30.1 SEASONAL ALLERGIC RHINITIS DUE TO POLLEN: Primary | ICD-10-CM

## 2018-05-14 PROBLEM — R63.8 INCREASED BMI: Status: ACTIVE | Noted: 2017-11-06

## 2018-05-14 PROCEDURE — 99214 OFFICE O/P EST MOD 30 MIN: CPT | Performed by: FAMILY MEDICINE

## 2018-05-14 RX ORDER — LORATADINE 10 MG/1
10 TABLET ORAL DAILY
Refills: 0
Start: 2018-05-14 | End: 2019-07-16

## 2018-05-14 NOTE — PROGRESS NOTES
Assessment/Plan: For her allergic rhinitis, she will get generic Claritin over-the-counter  For her nose lesion, we will refer her to Dermatology  We reviewed her blood work  She will continue same medication, and continue to watch her diet  Encouraged her to continue to walk  We will see her back in 4 months or p r n  No problem-specific Assessment & Plan notes found for this encounter  Diagnoses and all orders for this visit:    Seasonal allergic rhinitis due to pollen  -     loratadine (CLARITIN) 10 mg tablet; Take 1 tablet (10 mg total) by mouth daily    Lesion of nose  -     Ambulatory referral to Dermatology; Future    Other hyperlipidemia    Generalized anxiety disorder          Subjective:      Patient ID: Gisella Pike is a 59 y o  female  Patient here today for follow-up  Patient denies any chest pain or shortness of breath  Patient has been trying to be more active, she is walking more  Patient states yesterday started with a sore throat  No fever chills  No nausea vomiting  Nose has clear congestion  Also note, patient has had a small lesion on her nose that gets irritated and does not seem to heal       Sore Throat    This is a new problem  The current episode started yesterday  The problem has been unchanged  Neither side of throat is experiencing more pain than the other  There has been no fever  The patient is experiencing no pain  Associated symptoms include congestion and coughing ( mild)  Pertinent negatives include no diarrhea, ear pain or vomiting  She has tried nothing for the symptoms  Hyperlipidemia   This is a chronic problem  The problem is controlled  Recent lipid tests were reviewed and are normal  There are no known factors aggravating her hyperlipidemia  Current antihyperlipidemic treatment includes statins  The current treatment provides significant improvement of lipids  There are no compliance problems          The following portions of the patient's history were reviewed and updated as appropriate:   She  has a past medical history of Bladder stone and Ureteral calculus, right  She   Patient Active Problem List    Diagnosis Date Noted    Seasonal allergic rhinitis due to pollen 05/14/2018    Lesion of nose 05/14/2018    Increased BMI 11/06/2017    Generalized anxiety disorder 08/10/2012    Other hyperlipidemia 07/06/2012     She  has a past surgical history that includes Gallbladder surgery  Her family history includes Cancer in her maternal grandmother; Stroke in her mother  She  reports that she has never smoked  She has never used smokeless tobacco  She reports that she drinks alcohol  Her drug history is not on file  Current Outpatient Prescriptions   Medication Sig Dispense Refill    Ascorbic Acid (VITAMIN C) 100 MG tablet Take by mouth      lovastatin (MEVACOR) 40 MG tablet Take 1 tablet by mouth once daily 90 tablet 3    loratadine (CLARITIN) 10 mg tablet Take 1 tablet (10 mg total) by mouth daily  0     No current facility-administered medications for this visit  Current Outpatient Prescriptions on File Prior to Visit   Medication Sig    Ascorbic Acid (VITAMIN C) 100 MG tablet Take by mouth    lovastatin (MEVACOR) 40 MG tablet Take 1 tablet by mouth once daily     No current facility-administered medications on file prior to visit  She is allergic to penicillins       Review of Systems   Constitutional: Negative  HENT: Positive for congestion and sore throat  Negative for ear pain  Respiratory: Positive for cough ( mild)  Cardiovascular: Negative  Gastrointestinal: Negative for diarrhea and vomiting  Genitourinary: Negative  Skin:        As per HPI         Objective:      /82   Ht 5' 1" (1 549 m)   Wt 69 4 kg (153 lb)   BMI 28 91 kg/m²          Physical Exam   Constitutional: She is oriented to person, place, and time  She appears well-developed and well-nourished  No distress     HENT:   Mouth/Throat: Oropharynx is clear and moist  No oropharyngeal exudate  Nose clear nasal discharge  Tympanic membranes normal bilaterally   Cardiovascular: Normal rate, regular rhythm and normal heart sounds  Exam reveals no gallop and no friction rub  No murmur heard  Pulmonary/Chest: Effort normal and breath sounds normal  No respiratory distress  She has no wheezes  She has no rales  Musculoskeletal: She exhibits no edema  Neurological: She is alert and oriented to person, place, and time  Skin: She is not diaphoretic    2 mm red crusted lesion on her nose  Psychiatric: She has a normal mood and affect  Her behavior is normal  Judgment and thought content normal    Vitals reviewed

## 2018-07-31 ENCOUNTER — TRANSCRIBE ORDERS (OUTPATIENT)
Dept: ADMINISTRATIVE | Facility: HOSPITAL | Age: 58
End: 2018-07-31

## 2018-07-31 DIAGNOSIS — Z12.31 VISIT FOR SCREENING MAMMOGRAM: Primary | ICD-10-CM

## 2018-08-20 ENCOUNTER — ANNUAL EXAM (OUTPATIENT)
Dept: OBGYN CLINIC | Facility: CLINIC | Age: 58
End: 2018-08-20
Payer: COMMERCIAL

## 2018-08-20 DIAGNOSIS — Z01.419 ENCOUNTER FOR WELL WOMAN EXAM WITH ROUTINE GYNECOLOGICAL EXAM: ICD-10-CM

## 2018-08-20 DIAGNOSIS — Z12.31 ENCOUNTER FOR SCREENING MAMMOGRAM FOR MALIGNANT NEOPLASM OF BREAST: Primary | ICD-10-CM

## 2018-08-20 PROCEDURE — 99396 PREV VISIT EST AGE 40-64: CPT | Performed by: OBSTETRICS & GYNECOLOGY

## 2018-08-20 NOTE — PROGRESS NOTES
ASSESSMENT & PLAN: Carrie Ramires is a 59 y o  Luisa Naidu with normal gynecologic exam     1   Routine well woman exam done today  2  Pap and HPV:  The patient's last pap was   It was normal     Pap and cotesting was not done today  Current ASCCP Guidelines reviewed  Due 2019  3  Mammogram ordered  4  Colonoscopy due, patient aware  5  The following were reviewed in today's visit: breast self exam and mammography screening ordered      CC:  Annual Gynecologic Examination    HPI: Carrie Ramires is a 59 y o  Luisa Naidu who presents for annual gynecologic examination  She has the following concerns:  No GYN compaints; doing well, aware to schedule colonscopy    Health Maintenance:    She wears her seatbelt routinely  She does perform regular monthly self breast exams  She feels safe at home  Pap: 2016   Last mammogram: 2017  Last colonoscopy: 10 years ago    Past Medical History:   Diagnosis Date    Bladder stone     Ureteral calculus, right        Past Surgical History:   Procedure Laterality Date    GALLBLADDER SURGERY      onset:        Past OB/Gyn History:  OB History      Para Term  AB Living    0 0 0 0 0 0    SAB TAB Ectopic Multiple Live Births    0 0 0 0 0        Pt does not have menstrual issues  History of sexually transmitted infection: No   History of abnormal pap smears: No        Family History   Problem Relation Age of Onset    Stroke Mother         stroke syndrome    Cancer Maternal Grandmother        Social History:  Social History     Social History    Marital status: /Civil Union     Spouse name: N/A    Number of children: N/A    Years of education: N/A     Occupational History    Not on file       Social History Main Topics    Smoking status: Never Smoker    Smokeless tobacco: Never Used    Alcohol use Yes      Comment: social    Drug use: No    Sexual activity: Yes     Birth control/ protection: Post-menopausal     Other Topics Concern    Not on file     Social History Narrative    No narrative on file     Allergies   Allergen Reactions    Penicillins Hives       Current Outpatient Prescriptions:     Ascorbic Acid (VITAMIN C) 100 MG tablet, Take by mouth, Disp: , Rfl:     loratadine (CLARITIN) 10 mg tablet, Take 1 tablet (10 mg total) by mouth daily, Disp: , Rfl: 0    lovastatin (MEVACOR) 40 MG tablet, Take 1 tablet by mouth once daily, Disp: 90 tablet, Rfl: 3      Review of Systems  Constitutional :no fever, feels well, no tiredness, no recent weight gain or loss  ENT: no ear ache, no loss of hearing, no nosebleeds or nasal discharge, no sore throat or hoarseness  Cardiovascular: no complaints of slow or fast heart beat, no chest pain, no palpitations, no leg claudication or lower extremity edema  Respiratory: no complaints of shortness of shortness of breath, no DIAZ  Breasts:no complaints of breast pain, breast lump, or nipple discharge  Gastrointestinal: no complaints of abdominal pain, constipation, nausea, vomiting, or diarrhea or bloody stools  Genitourinary : no complaints of dysuria, incontinence, pelvic pain, no dysmenorrhea, vaginal discharge or abnormal vaginal bleeding and as noted in HPI  Musculoskeletal: no complaints of arthralgia, no myalgia, no joint swelling or stiffness, no limb pain or swelling  Integumentary: no complaints of skin rash or lesion, itching or dry skin  Neurological: no complaints of headache, no confusion, no numbness or tingling, no dizziness or fainting    Objective      There were no vitals taken for this visit      General:   appears stated age, cooperative, alert normal mood and affect   Neck: normal, supple,trachea midline, no masses   Heart: regular rate and rhythm, S1, S2 normal, no murmur, click, rub or gallop   Lungs: clear to auscultation bilaterally   Breasts: normal appearance, no masses or tenderness, Inspection negative, No nipple retraction or dimpling, No nipple discharge or bleeding, No axillary or supraclavicular adenopathy   Abdomen: soft, non-tender, without masses or organomegaly   Vulva: normal female genitalia, Bartholin's, Urethra, [place] normal, normal female hair distribution   Vagina: normal vagina, no discharge, exudate, lesion, or erythema   Urethra: normal   Cervix: Normal, no discharge  Uterus: normal size, contour, position, consistency, mobility, non-tender   Adnexa: no mass, fullness, tenderness   Lymphatic palpation of lymph nodes in neck, axilla, groin and/or other locations: no lymphadenopathy or masses noted   Skin normal skin turgor and no rashes     Psychiatric orientation to person, place, and time: normal  mood and affect: normal

## 2018-08-27 ENCOUNTER — HOSPITAL ENCOUNTER (OUTPATIENT)
Dept: MAMMOGRAPHY | Facility: HOSPITAL | Age: 58
Discharge: HOME | End: 2018-08-27
Attending: OBSTETRICS & GYNECOLOGY
Payer: COMMERCIAL

## 2018-08-27 DIAGNOSIS — Z12.31 ENCOUNTER FOR SCREENING MAMMOGRAM FOR MALIGNANT NEOPLASM OF BREAST: ICD-10-CM

## 2018-08-27 PROCEDURE — 77067 SCR MAMMO BI INCL CAD: CPT

## 2018-08-27 PROCEDURE — 77063 BREAST TOMOSYNTHESIS BI: CPT

## 2018-09-17 ENCOUNTER — OFFICE VISIT (OUTPATIENT)
Dept: FAMILY MEDICINE CLINIC | Facility: CLINIC | Age: 58
End: 2018-09-17
Payer: COMMERCIAL

## 2018-09-17 VITALS
HEIGHT: 61 IN | DIASTOLIC BLOOD PRESSURE: 82 MMHG | BODY MASS INDEX: 28.51 KG/M2 | SYSTOLIC BLOOD PRESSURE: 136 MMHG | WEIGHT: 151 LBS

## 2018-09-17 DIAGNOSIS — Z13.820 SCREENING FOR OSTEOPOROSIS: ICD-10-CM

## 2018-09-17 DIAGNOSIS — Z00.00 HEALTHCARE MAINTENANCE: Primary | ICD-10-CM

## 2018-09-17 DIAGNOSIS — Z13.6 SCREENING FOR CARDIOVASCULAR CONDITION: ICD-10-CM

## 2018-09-17 PROCEDURE — 99396 PREV VISIT EST AGE 40-64: CPT | Performed by: FAMILY MEDICINE

## 2018-09-17 NOTE — PROGRESS NOTES
Assessment/Plan:  Patient will continue her same medications  Will order  DEXA scan  She will get blood work before we see her again in 6 months  No problem-specific Assessment & Plan notes found for this encounter  Diagnoses and all orders for this visit:    Healthcare maintenance    Screening for cardiovascular condition  -     CBC and differential; Future  -     Comprehensive metabolic panel; Future  -     Lipid panel; Future    Screening for osteoporosis  -     DXA bone density spine hip and pelvis; Future          Subjective:      Patient ID: Lorenzo Cardenas is a 59 y o  female  Patient here today for follow-up  Patient denies any chest pain or shortness of breath  Patient did injure her left ribcage bending over couch to  her phone that dropped  She is feeling better now  Otherwise no new concerns  The following portions of the patient's history were reviewed and updated as appropriate:   She  has a past medical history of Bladder stone and Ureteral calculus, right  She   Patient Active Problem List    Diagnosis Date Noted    Seasonal allergic rhinitis due to pollen 05/14/2018    Lesion of nose 05/14/2018    Increased BMI 11/06/2017    Generalized anxiety disorder 08/10/2012    Other hyperlipidemia 07/06/2012     She  has a past surgical history that includes Gallbladder surgery  Her family history includes Cancer in her maternal grandmother; Stroke in her mother  She  reports that she has never smoked  She has never used smokeless tobacco  She reports that she drinks alcohol  She reports that she does not use drugs    Current Outpatient Prescriptions   Medication Sig Dispense Refill    Ascorbic Acid (VITAMIN C) 100 MG tablet Take by mouth      loratadine (CLARITIN) 10 mg tablet Take 1 tablet (10 mg total) by mouth daily  0    lovastatin (MEVACOR) 40 MG tablet Take 1 tablet by mouth once daily 90 tablet 3     No current facility-administered medications for this visit  Current Outpatient Prescriptions on File Prior to Visit   Medication Sig    Ascorbic Acid (VITAMIN C) 100 MG tablet Take by mouth    loratadine (CLARITIN) 10 mg tablet Take 1 tablet (10 mg total) by mouth daily    lovastatin (MEVACOR) 40 MG tablet Take 1 tablet by mouth once daily     No current facility-administered medications on file prior to visit  She is allergic to penicillins       Review of Systems   Constitutional: Negative  Respiratory: Negative  Cardiovascular: Negative  Gastrointestinal: Negative  Genitourinary: Negative  Objective:      /82   Ht 5' 1" (1 549 m)   Wt 68 5 kg (151 lb)   BMI 28 53 kg/m²          Physical Exam   Constitutional: She is oriented to person, place, and time  She appears well-developed and well-nourished  No distress  Cardiovascular: Normal rate, regular rhythm and normal heart sounds  Exam reveals no gallop and no friction rub  No murmur heard  Pulmonary/Chest: Effort normal and breath sounds normal  No respiratory distress  She has no wheezes  She has no rales  Musculoskeletal: She exhibits no edema  Neurological: She is alert and oriented to person, place, and time  Skin: She is not diaphoretic  Psychiatric: She has a normal mood and affect  Her behavior is normal  Judgment and thought content normal    Vitals reviewed

## 2018-09-17 NOTE — PATIENT INSTRUCTIONS
Wellness Visit for Adults   AMBULATORY CARE:   A wellness visit  is when you see your healthcare provider to get screened for health problems  You can also get advice on how to stay healthy  Write down your questions so you remember to ask them  Ask your healthcare provider how often you should have a wellness visit  What happens at a wellness visit:  Your healthcare provider will ask about your health, and your family history of health problems  This includes high blood pressure, heart disease, and cancer  He or she will ask if you have symptoms that concern you, if you smoke, and about your mood  You may also be asked about your intake of medicines, supplements, food, and alcohol  Any of the following may be done:  · Your weight  will be checked  Your height may also be checked so your body mass index (BMI) can be calculated  Your BMI shows if you are at a healthy weight  · Your blood pressure  and heart rate will be checked  Your temperature may also be checked  · Blood and urine tests  may be done  Blood tests may be done to check your cholesterol levels  Abnormal cholesterol levels increase your risk for heart disease and stroke  You may also need a blood or urine test to check for diabetes if you are at increased risk  Urine tests may be done to look for signs of an infection or kidney disease  · A physical exam  includes checking your heartbeat and lungs with a stethoscope  Your healthcare provider may also check your skin to look for sun damage  · Screening tests  may be recommended  A screening test is done to check for diseases that may not cause symptoms  The screening tests you may need depend on your age, gender, family history, and lifestyle habits  For example, colorectal screening may be recommended if you are 48years old or older  Screening tests you need if you are a woman:   · A Pap smear  is used to screen for cervical cancer   Pap smears are usually done every 3 to 5 years depending on your age  You may need them more often if you have had abnormal Pap smear test results in the past  Ask your healthcare provider how often you should have a Pap smear  · A mammogram  is an x-ray of your breasts to screen for breast cancer  Experts recommend mammograms every 2 years starting at age 48 years  You may need a mammogram at age 52 years or younger if you have an increased risk for breast cancer  Talk to your healthcare provider about when you should start having mammograms and how often you need them  Vaccines you may need:   · Get an influenza vaccine  every year  The influenza vaccine protects you from the flu  Several types of viruses cause the flu  The viruses change over time, so new vaccines are made each year  · Get a tetanus-diphtheria (Td) booster vaccine  every 10 years  This vaccine protects you against tetanus and diphtheria  Tetanus is a severe infection that may cause painful muscle spasms and lockjaw  Diphtheria is a severe bacterial infection that causes a thick covering in the back of your mouth and throat  · Get a human papillomavirus (HPV) vaccine  if you are female and aged 23 to 32 or male 23 to 24 and never received it  This vaccine protects you from HPV infection  HPV is the most common infection spread by sexual contact  HPV may also cause vaginal, penile, and anal cancers  · Get a pneumococcal vaccine  if you are aged 72 years or older  The pneumococcal vaccine is an injection given to protect you from pneumococcal disease  Pneumococcal disease is an infection caused by pneumococcal bacteria  The infection may cause pneumonia, meningitis, or an ear infection  · Get a shingles vaccine  if you are aged 61 or older, even if you have had shingles before  The shingles vaccine is an injection to protect you from the varicella-zoster virus  This is the same virus that causes chickenpox   Shingles is a painful rash that develops in people who had chickenpox or have been exposed to the virus  How to eat healthy:  My Plate is a model for planning healthy meals  It shows the types and amounts of foods that should go on your plate  Fruits and vegetables make up about half of your plate, and grains and protein make up the other half  A serving of dairy is included on the side of your plate  The amount of calories and serving sizes you need depends on your age, gender, weight, and height  Examples of healthy foods are listed below:  · Eat a variety of vegetables  such as dark green, red, and orange vegetables  You can also include canned vegetables low in sodium (salt) and frozen vegetables without added butter or sauces  · Eat a variety of fresh fruits , canned fruit in 100% juice, frozen fruit, and dried fruit  · Include whole grains  At least half of the grains you eat should be whole grains  Examples include whole-wheat bread, wheat pasta, brown rice, and whole-grain cereals such as oatmeal     · Eat a variety of protein foods such as seafood (fish and shellfish), lean meat, and poultry without skin (turkey and chicken)  Examples of lean meats include pork leg, shoulder, or tenderloin, and beef round, sirloin, tenderloin, and extra lean ground beef  Other protein foods include eggs and egg substitutes, beans, peas, soy products, nuts, and seeds  · Choose low-fat dairy products such as skim or 1% milk or low-fat yogurt, cheese, and cottage cheese  · Limit unhealthy fats  such as butter, hard margarine, and shortening  Exercise:  Exercise at least 30 minutes per day on most days of the week  Some examples of exercise include walking, biking, dancing, and swimming  You can also fit in more physical activity by taking the stairs instead of the elevator or parking farther away from stores  Include muscle strengthening activities 2 days each week  Regular exercise provides many health benefits   It helps you manage your weight, and decreases your risk for type 2 diabetes, heart disease, stroke, and high blood pressure  Exercise can also help improve your mood  Ask your healthcare provider about the best exercise plan for you  General health and safety guidelines:   · Do not smoke  Nicotine and other chemicals in cigarettes and cigars can cause lung damage  Ask your healthcare provider for information if you currently smoke and need help to quit  E-cigarettes or smokeless tobacco still contain nicotine  Talk to your healthcare provider before you use these products  · Limit alcohol  A drink of alcohol is 12 ounces of beer, 5 ounces of wine, or 1½ ounces of liquor  · Lose weight, if needed  Being overweight increases your risk of certain health conditions  These include heart disease, high blood pressure, type 2 diabetes, and certain types of cancer  · Protect your skin  Do not sunbathe or use tanning beds  Use sunscreen with a SPF 15 or higher  Apply sunscreen at least 15 minutes before you go outside  Reapply sunscreen every 2 hours  Wear protective clothing, hats, and sunglasses when you are outside  · Drive safely  Always wear your seatbelt  Make sure everyone in your car wears a seatbelt  A seatbelt can save your life if you are in an accident  Do not use your cell phone when you are driving  This could distract you and cause an accident  Pull over if you need to make a call or send a text message  · Practice safe sex  Use latex condoms if are sexually active and have more than one partner  Your healthcare provider may recommend screening tests for sexually transmitted infections (STIs)  · Wear helmets, lifejackets, and protective gear  Always wear a helmet when you ride a bike or motorcycle, go skiing, or play sports that could cause a head injury  Wear protective equipment when you play sports  Wear a lifejacket when you are on a boat or doing water sports    © 2017 [employer] Information is for End User's use only and may not be sold, redistributed or otherwise used for commercial purposes  All illustrations and images included in CareNotes® are the copyrighted property of A D A M , Inc  or [employer]  The above information is an  only  It is not intended as medical advice for individual conditions or treatments  Talk to your doctor, nurse or pharmacist before following any medical regimen to see if it is safe and effective for you  Thank you for enrolling in [facility]  Please follow the instructions below to securely access your online medical record  [employer] allows you to send messages to your doctor, view your test results, renew your prescriptions, schedule appointments, and more  [facility] uses Single Sign on (SSO) Technology for you to log in and access our [facility] [address]  [place], including [employer]  No more remembering multiple user names and passwords! We are going to guide you through, step by step, to help you set up your [address] [address] [employer] account which will provide access to your [employer] account  How Do I Sign Up? 1  In your Internet browser, go to [email or web address]       2  Click on the [facility] account and then click Dont have an                 Account? Create one now      3  Enter your demographic information and chose a user name (email address) and password  Think of one that is secure and easy to remember  Enter a Referral code if you have one (this is not your [employer] code ) Accept the Terms and Conditions and the Privacy Policy  4  Select your security questions that you will use to reset your password should you forget it  Click Submit  5  Enter your Split[employer] Activation Code exactly as it appears below  You will not need to use this code after you have completed the sign-up process  If you do not sign up before the expiration date, you must request a new code                           [employer] Activation Code: [email or web address]  Expires: 10/1/2018  9:41 AM    6  Confirm your email address  An email confirmation was sent to you  Please open that email and click Confirm your Email   You should then be redirected to our Jennifer [facility]man Single sign on page, where you will log on with the user name and password you created! Proceed to the [employer] Icon to view your personal health information  Additional Information  If you have questions, you can e-mail [email or web address] or call [phone] to talk to our customer support staff  Remember, [employer] is NOT to be used for urgent needs  For medical emergencies, dial 911

## 2018-10-09 ENCOUNTER — HOSPITAL ENCOUNTER (OUTPATIENT)
Dept: BONE DENSITY | Facility: HOSPITAL | Age: 58
Discharge: HOME | End: 2018-10-09
Payer: COMMERCIAL

## 2018-10-09 DIAGNOSIS — M81.0 AGE-RELATED OSTEOPOROSIS WITHOUT CURRENT PATHOLOGICAL FRACTURE: Primary | ICD-10-CM

## 2018-10-09 DIAGNOSIS — Z13.820 SCREENING FOR OSTEOPOROSIS: ICD-10-CM

## 2018-10-09 PROCEDURE — 77080 DXA BONE DENSITY AXIAL: CPT

## 2018-10-09 RX ORDER — ALENDRONATE SODIUM 70 MG/1
70 TABLET ORAL
Qty: 4 TABLET | Refills: 5 | Status: SHIPPED | OUTPATIENT
Start: 2018-10-09 | End: 2018-12-10

## 2018-12-10 ENCOUNTER — OFFICE VISIT (OUTPATIENT)
Dept: FAMILY MEDICINE CLINIC | Facility: CLINIC | Age: 58
End: 2018-12-10
Payer: COMMERCIAL

## 2018-12-10 VITALS
BODY MASS INDEX: 27.9 KG/M2 | TEMPERATURE: 98.4 F | SYSTOLIC BLOOD PRESSURE: 130 MMHG | WEIGHT: 147.8 LBS | DIASTOLIC BLOOD PRESSURE: 70 MMHG | HEIGHT: 61 IN

## 2018-12-10 DIAGNOSIS — H60.501 ACUTE OTITIS EXTERNA OF RIGHT EAR, UNSPECIFIED TYPE: ICD-10-CM

## 2018-12-10 DIAGNOSIS — H61.21 HEARING LOSS DUE TO CERUMEN IMPACTION, RIGHT: Primary | ICD-10-CM

## 2018-12-10 PROCEDURE — 99214 OFFICE O/P EST MOD 30 MIN: CPT | Performed by: PHYSICIAN ASSISTANT

## 2018-12-10 PROCEDURE — 1036F TOBACCO NON-USER: CPT | Performed by: PHYSICIAN ASSISTANT

## 2018-12-10 PROCEDURE — 3008F BODY MASS INDEX DOCD: CPT | Performed by: PHYSICIAN ASSISTANT

## 2018-12-10 RX ORDER — OFLOXACIN 3 MG/ML
10 SOLUTION AURICULAR (OTIC) DAILY
Qty: 5 ML | Refills: 0 | Status: SHIPPED | OUTPATIENT
Start: 2018-12-10 | End: 2018-12-15

## 2018-12-10 NOTE — PROGRESS NOTES
Ear cerumen removal  Date/Time: 12/10/2018 1:45 PM  Performed by: Sandee Cee  Authorized by: Sandee Cee     Patient location:  Clinic  Indications / Diagnosis:  Cerumen impaction  Other Assisting Provider: No    Consent:     Consent obtained:  Verbal    Consent given by:  Patient    Risks discussed:  Bleeding, infection, incomplete removal, pain, TM perforation and dizziness  Procedure details:     Location:  R ear    Procedure type: irrigation    Post-procedure details:     Complication:  None    Hearing quality:  Improved    Patient tolerance of procedure:   Tolerated well, no immediate complications

## 2018-12-10 NOTE — PROGRESS NOTES
Assessment/Plan:     Diagnoses and all orders for this visit:    Hearing loss due to cerumen impaction, right  -     Ear cerumen removal    Acute otitis externa of right ear, unspecified type  -     ofloxacin (FLOXIN) 0 3 % otic solution; Administer 10 drops to the right ear daily for 5 days    Other orders  -     Pseudoephedrine-Guaifenesin (MUCINEX D PO); Take 1,200 mg by mouth        Flushed right ear  Hearing improved significantly per patient  Prescribed ofloxacin drops for right ear due to secondary otitis externa  Advised patient to return if symptoms worsen  Subjective:      Patient ID: Claudia Blackwell is a 59 y o  female  Patient is a pleasant 59year old female who presents with a blocked ear for the past 2 days  She states that she tried taking Mucinex, but it did not provide any relief  Patient admits that she had this problem last year and had to have her ears cleaned out  She admits that her hearing has been decreased in her right ear, and she had an episode yesterday where she felt a little dizzy  She denies any runny nose, sore throat, fevers, chills, headache, sinus pressure, or allergies  The following portions of the patient's history were reviewed and updated as appropriate:   She  has a past medical history of Bladder stone and Ureteral calculus, right  She   Patient Active Problem List    Diagnosis Date Noted    Age-related osteoporosis without current pathological fracture 10/09/2018    Seasonal allergic rhinitis due to pollen 05/14/2018    Lesion of nose 05/14/2018    Increased BMI 11/06/2017    Generalized anxiety disorder 08/10/2012    Other hyperlipidemia 07/06/2012     She  has a past surgical history that includes Gallbladder surgery  Her family history includes Cancer in her maternal grandmother; Stroke in her mother  She  reports that she has never smoked  She has never used smokeless tobacco  She reports that she drinks alcohol   She reports that she does not use drugs  Current Outpatient Prescriptions   Medication Sig Dispense Refill    Ascorbic Acid (VITAMIN C) 100 MG tablet Take by mouth      loratadine (CLARITIN) 10 mg tablet Take 1 tablet (10 mg total) by mouth daily  0    lovastatin (MEVACOR) 40 MG tablet Take 1 tablet by mouth once daily 90 tablet 3    Pseudoephedrine-Guaifenesin (MUCINEX D PO) Take 1,200 mg by mouth      ofloxacin (FLOXIN) 0 3 % otic solution Administer 10 drops to the right ear daily for 5 days 5 mL 0     No current facility-administered medications for this visit  Current Outpatient Prescriptions on File Prior to Visit   Medication Sig    Ascorbic Acid (VITAMIN C) 100 MG tablet Take by mouth    loratadine (CLARITIN) 10 mg tablet Take 1 tablet (10 mg total) by mouth daily    lovastatin (MEVACOR) 40 MG tablet Take 1 tablet by mouth once daily    [DISCONTINUED] alendronate (FOSAMAX) 70 mg tablet Take 1 tablet (70 mg total) by mouth every 7 days     No current facility-administered medications on file prior to visit  She is allergic to penicillins       Review of Systems   Constitutional: Negative for chills, diaphoresis, fatigue and fever  HENT: Positive for hearing loss (right ear)  Negative for congestion, ear pain, postnasal drip, rhinorrhea, sneezing, sore throat and trouble swallowing  Right ear feels "blocked"   Eyes: Negative for pain and visual disturbance  Respiratory: Negative for apnea, cough, shortness of breath and wheezing  Cardiovascular: Negative for chest pain and palpitations  Gastrointestinal: Negative for abdominal pain, constipation, diarrhea, nausea and vomiting  Neurological: Negative for dizziness, syncope, weakness, light-headedness, numbness and headaches  Psychiatric/Behavioral: Negative for suicidal ideas  The patient is not nervous/anxious            Objective:    /70   Temp 98 4 °F (36 9 °C)   Ht 5' 1" (1 549 m)   Wt 67 kg (147 lb 12 8 oz)   BMI 27 93 kg/m² Physical Exam   Constitutional: She is oriented to person, place, and time  She appears well-developed and well-nourished  HENT:   Head: Normocephalic and atraumatic  Right Ear: Tympanic membrane and external ear normal  Tympanic membrane is not injected, not erythematous and not bulging  Left Ear: Tympanic membrane, external ear and ear canal normal  Tympanic membrane is not injected, not erythematous and not bulging  Nose: Nose normal    Mouth/Throat: Oropharynx is clear and moist and mucous membranes are normal  No oropharyngeal exudate, posterior oropharyngeal edema or posterior oropharyngeal erythema  Right ear impacted with cerumen  Flushed ear and removed most of cerumen revealing otitis externa of right ear canal       Eyes: Pupils are equal, round, and reactive to light  EOM are normal    Neck: Normal range of motion  Neck supple  Cardiovascular: Normal rate, regular rhythm and normal heart sounds  Exam reveals no gallop and no friction rub  No murmur heard  Pulmonary/Chest: Effort normal and breath sounds normal  No respiratory distress  She has no wheezes  She has no rales  Musculoskeletal: Normal range of motion  Lymphadenopathy:     She has no cervical adenopathy  Neurological: She is alert and oriented to person, place, and time  Skin: Skin is warm and dry  Psychiatric: She has a normal mood and affect  Her behavior is normal  Judgment and thought content normal    Vitals reviewed

## 2019-03-15 ENCOUNTER — APPOINTMENT (OUTPATIENT)
Dept: LAB | Facility: MEDICAL CENTER | Age: 59
End: 2019-03-15
Payer: COMMERCIAL

## 2019-03-15 DIAGNOSIS — Z13.6 SCREENING FOR CARDIOVASCULAR CONDITION: ICD-10-CM

## 2019-03-15 LAB
ALBUMIN SERPL BCP-MCNC: 3.9 G/DL (ref 3.5–5)
ALP SERPL-CCNC: 101 U/L (ref 46–116)
ALT SERPL W P-5'-P-CCNC: 20 U/L (ref 12–78)
ANION GAP SERPL CALCULATED.3IONS-SCNC: 5 MMOL/L (ref 4–13)
AST SERPL W P-5'-P-CCNC: 21 U/L (ref 5–45)
BASOPHILS # BLD AUTO: 0.07 THOUSANDS/ΜL (ref 0–0.1)
BASOPHILS NFR BLD AUTO: 1 % (ref 0–1)
BILIRUB SERPL-MCNC: 0.67 MG/DL (ref 0.2–1)
BUN SERPL-MCNC: 18 MG/DL (ref 5–25)
CALCIUM SERPL-MCNC: 9.6 MG/DL (ref 8.3–10.1)
CHLORIDE SERPL-SCNC: 107 MMOL/L (ref 100–108)
CHOLEST SERPL-MCNC: 211 MG/DL (ref 50–200)
CO2 SERPL-SCNC: 28 MMOL/L (ref 21–32)
CREAT SERPL-MCNC: 0.74 MG/DL (ref 0.6–1.3)
EOSINOPHIL # BLD AUTO: 0.19 THOUSAND/ΜL (ref 0–0.61)
EOSINOPHIL NFR BLD AUTO: 3 % (ref 0–6)
ERYTHROCYTE [DISTWIDTH] IN BLOOD BY AUTOMATED COUNT: 12.7 % (ref 11.6–15.1)
GFR SERPL CREATININE-BSD FRML MDRD: 88 ML/MIN/1.73SQ M
GLUCOSE P FAST SERPL-MCNC: 91 MG/DL (ref 65–99)
HCT VFR BLD AUTO: 45.5 % (ref 34.8–46.1)
HDLC SERPL-MCNC: 74 MG/DL (ref 40–60)
HGB BLD-MCNC: 15 G/DL (ref 11.5–15.4)
IMM GRANULOCYTES # BLD AUTO: 0.02 THOUSAND/UL (ref 0–0.2)
IMM GRANULOCYTES NFR BLD AUTO: 0 % (ref 0–2)
LDLC SERPL CALC-MCNC: 116 MG/DL (ref 0–100)
LYMPHOCYTES # BLD AUTO: 2.6 THOUSANDS/ΜL (ref 0.6–4.47)
LYMPHOCYTES NFR BLD AUTO: 34 % (ref 14–44)
MCH RBC QN AUTO: 31.1 PG (ref 26.8–34.3)
MCHC RBC AUTO-ENTMCNC: 33 G/DL (ref 31.4–37.4)
MCV RBC AUTO: 94 FL (ref 82–98)
MONOCYTES # BLD AUTO: 0.69 THOUSAND/ΜL (ref 0.17–1.22)
MONOCYTES NFR BLD AUTO: 9 % (ref 4–12)
NEUTROPHILS # BLD AUTO: 4.06 THOUSANDS/ΜL (ref 1.85–7.62)
NEUTS SEG NFR BLD AUTO: 53 % (ref 43–75)
NONHDLC SERPL-MCNC: 137 MG/DL
NRBC BLD AUTO-RTO: 0 /100 WBCS
PLATELET # BLD AUTO: 300 THOUSANDS/UL (ref 149–390)
PMV BLD AUTO: 9.7 FL (ref 8.9–12.7)
POTASSIUM SERPL-SCNC: 3.9 MMOL/L (ref 3.5–5.3)
PROT SERPL-MCNC: 7.9 G/DL (ref 6.4–8.2)
RBC # BLD AUTO: 4.82 MILLION/UL (ref 3.81–5.12)
SODIUM SERPL-SCNC: 140 MMOL/L (ref 136–145)
TRIGL SERPL-MCNC: 103 MG/DL
WBC # BLD AUTO: 7.63 THOUSAND/UL (ref 4.31–10.16)

## 2019-03-15 PROCEDURE — 80053 COMPREHEN METABOLIC PANEL: CPT

## 2019-03-15 PROCEDURE — 36415 COLL VENOUS BLD VENIPUNCTURE: CPT

## 2019-03-15 PROCEDURE — 80061 LIPID PANEL: CPT

## 2019-03-15 PROCEDURE — 85025 COMPLETE CBC W/AUTO DIFF WBC: CPT

## 2019-03-20 DIAGNOSIS — E78.5 HYPERLIPIDEMIA, UNSPECIFIED HYPERLIPIDEMIA TYPE: ICD-10-CM

## 2019-03-20 RX ORDER — LOVASTATIN 40 MG/1
TABLET ORAL
Qty: 90 TABLET | Refills: 3 | Status: SHIPPED | OUTPATIENT
Start: 2019-03-20 | End: 2019-12-30 | Stop reason: SDUPTHER

## 2019-03-25 ENCOUNTER — OFFICE VISIT (OUTPATIENT)
Dept: FAMILY MEDICINE CLINIC | Facility: CLINIC | Age: 59
End: 2019-03-25
Payer: COMMERCIAL

## 2019-03-25 VITALS
SYSTOLIC BLOOD PRESSURE: 128 MMHG | DIASTOLIC BLOOD PRESSURE: 74 MMHG | BODY MASS INDEX: 27.9 KG/M2 | WEIGHT: 147.8 LBS | HEIGHT: 61 IN

## 2019-03-25 DIAGNOSIS — F41.1 GENERALIZED ANXIETY DISORDER: ICD-10-CM

## 2019-03-25 DIAGNOSIS — E78.49 OTHER HYPERLIPIDEMIA: Primary | ICD-10-CM

## 2019-03-25 PROCEDURE — 99213 OFFICE O/P EST LOW 20 MIN: CPT | Performed by: FAMILY MEDICINE

## 2019-03-25 PROCEDURE — 1036F TOBACCO NON-USER: CPT | Performed by: FAMILY MEDICINE

## 2019-03-25 PROCEDURE — 3008F BODY MASS INDEX DOCD: CPT | Performed by: FAMILY MEDICINE

## 2019-03-25 NOTE — PROGRESS NOTES
Assessment/Plan:  Patient will continue same medications  Blood work reviewed  We will see her back in in 9-10 months or p r n  No problem-specific Assessment & Plan notes found for this encounter  Diagnoses and all orders for this visit:    Other hyperlipidemia    Generalized anxiety disorder          Subjective:      Patient ID: Judy Segovia is a 59 y o  female  Patient seen examined today  Patient feeling well  The chest pain or shortness of breath  Tolerating her Mevacor well  Blood work was stable  Patient has no concerns  Hyperlipidemia   This is a chronic problem  The problem is controlled  Recent lipid tests were reviewed and are normal  There are no known factors aggravating her hyperlipidemia  Current antihyperlipidemic treatment includes statins  The current treatment provides significant improvement of lipids  There are no compliance problems  Risk factors for coronary artery disease include post-menopausal        The following portions of the patient's history were reviewed and updated as appropriate:   She  has a past medical history of Bladder stone and Ureteral calculus, right  She   Patient Active Problem List    Diagnosis Date Noted    Age-related osteoporosis without current pathological fracture 10/09/2018    Seasonal allergic rhinitis due to pollen 05/14/2018    Lesion of nose 05/14/2018    Increased BMI 11/06/2017    Generalized anxiety disorder 08/10/2012    Other hyperlipidemia 07/06/2012     She  has a past surgical history that includes Gallbladder surgery  Her family history includes Cancer in her maternal grandmother; Stroke in her mother  She  reports that she has never smoked  She has never used smokeless tobacco  She reports that she drinks alcohol  She reports that she does not use drugs    Current Outpatient Medications   Medication Sig Dispense Refill    Ascorbic Acid (VITAMIN C) 100 MG tablet Take by mouth      loratadine (CLARITIN) 10 mg tablet Take 1 tablet (10 mg total) by mouth daily  0    lovastatin (MEVACOR) 40 MG tablet Take 1 tablet by mouth once daily 90 tablet 3    Pseudoephedrine-Guaifenesin (MUCINEX D PO) Take 1,200 mg by mouth       No current facility-administered medications for this visit  Current Outpatient Medications on File Prior to Visit   Medication Sig    Ascorbic Acid (VITAMIN C) 100 MG tablet Take by mouth    loratadine (CLARITIN) 10 mg tablet Take 1 tablet (10 mg total) by mouth daily    lovastatin (MEVACOR) 40 MG tablet Take 1 tablet by mouth once daily    Pseudoephedrine-Guaifenesin (MUCINEX D PO) Take 1,200 mg by mouth     No current facility-administered medications on file prior to visit  She is allergic to penicillins       Review of Systems   Constitutional: Negative  Respiratory: Negative  Cardiovascular: Negative  Gastrointestinal: Negative  Genitourinary: Negative  Objective:      /74   Ht 5' 1" (1 549 m)   Wt 67 kg (147 lb 12 8 oz)   BMI 27 93 kg/m²          Physical Exam   Constitutional: She is oriented to person, place, and time  She appears well-developed and well-nourished  No distress  HENT:   Head: Normocephalic and atraumatic  Eyes: Conjunctivae are normal    Cardiovascular: Normal rate, regular rhythm and normal heart sounds  Exam reveals no gallop and no friction rub  No murmur heard  Pulmonary/Chest: Effort normal and breath sounds normal  No respiratory distress  She has no wheezes  She has no rales  Musculoskeletal: She exhibits no edema  Neurological: She is alert and oriented to person, place, and time  Skin: She is not diaphoretic  Psychiatric: She has a normal mood and affect  Her behavior is normal  Judgment and thought content normal    Vitals reviewed

## 2019-06-17 LAB
LEFT EYE DIABETIC RETINOPATHY: NORMAL
RIGHT EYE DIABETIC RETINOPATHY: NORMAL

## 2019-07-11 ENCOUNTER — HOSPITAL ENCOUNTER (EMERGENCY)
Facility: HOSPITAL | Age: 59
End: 2019-07-11
Attending: EMERGENCY MEDICINE | Admitting: EMERGENCY MEDICINE
Payer: COMMERCIAL

## 2019-07-11 ENCOUNTER — APPOINTMENT (INPATIENT)
Dept: MRI IMAGING | Facility: HOSPITAL | Age: 59
DRG: 065 | End: 2019-07-11
Payer: COMMERCIAL

## 2019-07-11 ENCOUNTER — HOSPITAL ENCOUNTER (INPATIENT)
Facility: HOSPITAL | Age: 59
LOS: 1 days | Discharge: HOME | DRG: 065 | End: 2019-07-12
Attending: INTERNAL MEDICINE | Admitting: INTERNAL MEDICINE
Payer: COMMERCIAL

## 2019-07-11 ENCOUNTER — APPOINTMENT (EMERGENCY)
Dept: CT IMAGING | Facility: HOSPITAL | Age: 59
End: 2019-07-11
Payer: COMMERCIAL

## 2019-07-11 ENCOUNTER — APPOINTMENT (EMERGENCY)
Dept: RADIOLOGY | Facility: HOSPITAL | Age: 59
End: 2019-07-11
Payer: COMMERCIAL

## 2019-07-11 VITALS
WEIGHT: 151.46 LBS | OXYGEN SATURATION: 97 % | RESPIRATION RATE: 18 BRPM | TEMPERATURE: 100 F | SYSTOLIC BLOOD PRESSURE: 150 MMHG | DIASTOLIC BLOOD PRESSURE: 94 MMHG | HEART RATE: 104 BPM | BODY MASS INDEX: 28.62 KG/M2

## 2019-07-11 DIAGNOSIS — I63.9 STROKE ABORTED BY ADMINISTRATION OF THROMBOLYTIC AGENT (HCC): ICD-10-CM

## 2019-07-11 DIAGNOSIS — I63.9 CEREBROVASCULAR ACCIDENT (CVA), UNSPECIFIED MECHANISM (HCC): Primary | ICD-10-CM

## 2019-07-11 DIAGNOSIS — I63.9 ACUTE CVA (CEREBROVASCULAR ACCIDENT) (HCC): Primary | ICD-10-CM

## 2019-07-11 LAB
ABO GROUP BLD: NORMAL
ANION GAP SERPL CALCULATED.3IONS-SCNC: 9 MMOL/L (ref 4–13)
APTT PPP: 29 SECONDS (ref 23–37)
BACTERIA UR QL AUTO: ABNORMAL /HPF
BILIRUB UR QL STRIP: NEGATIVE
BLD GP AB SCN SERPL QL: NEGATIVE
BUN SERPL-MCNC: 22 MG/DL (ref 5–25)
CALCIUM SERPL-MCNC: 10.1 MG/DL (ref 8.3–10.1)
CHLORIDE SERPL-SCNC: 105 MMOL/L (ref 100–108)
CLARITY UR: CLEAR
CO2 SERPL-SCNC: 26 MMOL/L (ref 21–32)
COLOR UR: YELLOW
CREAT SERPL-MCNC: 0.71 MG/DL (ref 0.6–1.3)
ERYTHROCYTE [DISTWIDTH] IN BLOOD BY AUTOMATED COUNT: 12.8 % (ref 11.6–15.1)
GFR SERPL CREATININE-BSD FRML MDRD: 93 ML/MIN/1.73SQ M
GLUCOSE SERPL-MCNC: 101 MG/DL (ref 65–140)
GLUCOSE UR STRIP-MCNC: NEGATIVE MG/DL
HCT VFR BLD AUTO: 47.5 % (ref 34.8–46.1)
HGB BLD-MCNC: 15.6 G/DL (ref 11.5–15.4)
HGB UR QL STRIP.AUTO: ABNORMAL
HOLD SPECIMEN: NORMAL
INR PPP: 1.01 (ref 0.84–1.19)
KETONES UR STRIP-MCNC: ABNORMAL MG/DL
LEUKOCYTE ESTERASE UR QL STRIP: NEGATIVE
MCH RBC QN AUTO: 31 PG (ref 26.8–34.3)
MCHC RBC AUTO-ENTMCNC: 32.8 G/DL (ref 31.4–37.4)
MCV RBC AUTO: 94 FL (ref 82–98)
NITRITE UR QL STRIP: NEGATIVE
NON-SQ EPI CELLS URNS QL MICRO: ABNORMAL /HPF
PH UR STRIP.AUTO: 6.5 [PH]
PLATELET # BLD AUTO: 338 THOUSANDS/UL (ref 149–390)
PMV BLD AUTO: 8.9 FL (ref 8.9–12.7)
POTASSIUM SERPL-SCNC: 3.9 MMOL/L (ref 3.5–5.3)
PROT UR STRIP-MCNC: NEGATIVE MG/DL
PROTHROMBIN TIME: 13.3 SECONDS (ref 11.6–14.5)
RBC # BLD AUTO: 5.04 MILLION/UL (ref 3.81–5.12)
RBC #/AREA URNS AUTO: ABNORMAL /HPF
RH BLD: POSITIVE
SODIUM SERPL-SCNC: 140 MMOL/L (ref 136–145)
SP GR UR STRIP.AUTO: <=1.005 (ref 1–1.03)
SPECIMEN EXPIRATION DATE: NORMAL
UROBILINOGEN UR QL STRIP.AUTO: 0.2 E.U./DL
WBC # BLD AUTO: 12.28 THOUSAND/UL (ref 4.31–10.16)
WBC #/AREA URNS AUTO: ABNORMAL /HPF

## 2019-07-11 PROCEDURE — 70498 CT ANGIOGRAPHY NECK: CPT

## 2019-07-11 PROCEDURE — 80048 BASIC METABOLIC PNL TOTAL CA: CPT | Performed by: EMERGENCY MEDICINE

## 2019-07-11 PROCEDURE — 81001 URINALYSIS AUTO W/SCOPE: CPT | Performed by: NURSE PRACTITIONER

## 2019-07-11 PROCEDURE — 99215 OFFICE O/P EST HI 40 MIN: CPT | Performed by: PSYCHIATRY & NEUROLOGY

## 2019-07-11 PROCEDURE — 99223 1ST HOSP IP/OBS HIGH 75: CPT | Performed by: INTERNAL MEDICINE

## 2019-07-11 PROCEDURE — 86901 BLOOD TYPING SEROLOGIC RH(D): CPT | Performed by: EMERGENCY MEDICINE

## 2019-07-11 PROCEDURE — 99285 EMERGENCY DEPT VISIT HI MDM: CPT

## 2019-07-11 PROCEDURE — 85730 THROMBOPLASTIN TIME PARTIAL: CPT | Performed by: EMERGENCY MEDICINE

## 2019-07-11 PROCEDURE — 86850 RBC ANTIBODY SCREEN: CPT | Performed by: EMERGENCY MEDICINE

## 2019-07-11 PROCEDURE — 93005 ELECTROCARDIOGRAM TRACING: CPT

## 2019-07-11 PROCEDURE — 70551 MRI BRAIN STEM W/O DYE: CPT

## 2019-07-11 PROCEDURE — 85610 PROTHROMBIN TIME: CPT | Performed by: EMERGENCY MEDICINE

## 2019-07-11 PROCEDURE — 70496 CT ANGIOGRAPHY HEAD: CPT

## 2019-07-11 PROCEDURE — 71045 X-RAY EXAM CHEST 1 VIEW: CPT

## 2019-07-11 PROCEDURE — 99291 CRITICAL CARE FIRST HOUR: CPT | Performed by: EMERGENCY MEDICINE

## 2019-07-11 PROCEDURE — 86900 BLOOD TYPING SEROLOGIC ABO: CPT | Performed by: EMERGENCY MEDICINE

## 2019-07-11 PROCEDURE — 85027 COMPLETE CBC AUTOMATED: CPT | Performed by: EMERGENCY MEDICINE

## 2019-07-11 PROCEDURE — 36415 COLL VENOUS BLD VENIPUNCTURE: CPT | Performed by: EMERGENCY MEDICINE

## 2019-07-11 RX ORDER — ATORVASTATIN CALCIUM 40 MG/1
40 TABLET, FILM COATED ORAL EVERY EVENING
Status: DISCONTINUED | OUTPATIENT
Start: 2019-07-11 | End: 2019-07-12 | Stop reason: HOSPADM

## 2019-07-11 RX ORDER — ACETAMINOPHEN 325 MG/1
650 TABLET ORAL EVERY 6 HOURS PRN
Status: DISCONTINUED | OUTPATIENT
Start: 2019-07-11 | End: 2019-07-12 | Stop reason: HOSPADM

## 2019-07-11 RX ORDER — SODIUM CHLORIDE 9 MG/ML
50 INJECTION, SOLUTION INTRAVENOUS CONTINUOUS
Status: DISCONTINUED | OUTPATIENT
Start: 2019-07-11 | End: 2019-07-12 | Stop reason: HOSPADM

## 2019-07-11 RX ORDER — ONDANSETRON 2 MG/ML
4 INJECTION INTRAMUSCULAR; INTRAVENOUS EVERY 6 HOURS PRN
Status: DISCONTINUED | OUTPATIENT
Start: 2019-07-11 | End: 2019-07-12 | Stop reason: HOSPADM

## 2019-07-11 RX ORDER — LABETALOL 20 MG/4 ML (5 MG/ML) INTRAVENOUS SYRINGE
10 EVERY 6 HOURS PRN
Status: DISCONTINUED | OUTPATIENT
Start: 2019-07-11 | End: 2019-07-11 | Stop reason: HOSPADM

## 2019-07-11 RX ORDER — MULTIVIT WITH MINERALS/LUTEIN
125 TABLET ORAL DAILY
Status: DISCONTINUED | OUTPATIENT
Start: 2019-07-11 | End: 2019-07-12 | Stop reason: HOSPADM

## 2019-07-11 RX ADMIN — ATORVASTATIN CALCIUM 40 MG: 40 TABLET, FILM COATED ORAL at 18:36

## 2019-07-11 RX ADMIN — IOHEXOL 100 ML: 350 INJECTION, SOLUTION INTRAVENOUS at 12:31

## 2019-07-11 RX ADMIN — ONDANSETRON 4 MG: 2 INJECTION INTRAMUSCULAR; INTRAVENOUS at 19:09

## 2019-07-11 RX ADMIN — SODIUM CHLORIDE 50 ML/HR: 0.9 INJECTION, SOLUTION INTRAVENOUS at 16:43

## 2019-07-11 RX ADMIN — Medication 125 MG: at 18:36

## 2019-07-11 RX ADMIN — ALTEPLASE 6.2 MG: KIT at 13:08

## 2019-07-11 RX ADMIN — ALTEPLASE 55.6 MG: KIT at 13:10

## 2019-07-11 NOTE — H&P
History & Physical Exam - Critical Care   Yonatan Durant 59 y o  female MRN: [id number]  Unit/Bed#: ICU 11 Encounter: [id number]      Assessment/Plan:  1  Stroke like symptoms with right sided hemiparesis S/P tPA  · Will admit patient to critical care service, will require greater than 2 midnight hospitalization stay  · Monitor neurological status closely with frequent checks and follow stroke pathway  · Monitor and trend CBC, and clotting profile  · Check Echocardiogram  · CT head in 24 hours to assess for bleeding  · MRI ordered  · Obtain HgbA1C    · PT/OT evaluation  · Bedside swallow evaluation, will obtain complete exam if needed  2  Hyperlipidemia  · Monitor lipid panel  · Continue Lipitor  3  Lung nodules  · Will need follow up in 1 year        _____________________________________________________________________      HPI:    Yonatan Durant is a 59 y o  female who presents with a pmh significant for hyperlipidemia to [facility] [place] [place] s/p TPA  Patient was at work today when she noticed that she could not  a can at approximately 11am, she states that she kept dropping it  She rested for a short period and noted that it became worst with inability to lift right arm and weakness in legs at 1115 am and due to this EMS was activated  She denies any previous stroke, falls or trauma  Denies any chest pain or SOB  Denies any history past or present of tobacco use  Family history of stroke with mom in her 46s  Patient received tPA and completed at 1400 at [facility] with improvement in symptoms  She is now able to lift right arm but has a drift and weakness of right hand  Review of Systems:  Review of Systems   Constitutional: Positive for activity change  Negative for chills and fever  HENT: Negative  Eyes: Negative  Respiratory: Negative for cough and shortness of breath  Cardiovascular: Negative for chest pain and palpitations     Gastrointestinal: Positive for diarrhea  Negative for nausea and vomiting  Endocrine: Negative  Genitourinary: Negative  Musculoskeletal: Positive for gait problem  Skin: Negative  Neurological: Positive for weakness and numbness  Right sided weakness, unable to   Psychiatric/Behavioral: Negative  Historical Information   Past Medical History:   Diagnosis Date    Bladder stone     Ureteral calculus, right      Past Surgical History:   Procedure Laterality Date    GALLBLADDER SURGERY      onset: 2011     Social History   Social History     Substance and Sexual Activity   Alcohol Use Yes    Frequency: Monthly or less    Drinks per session: 1 or 2    Binge frequency: Never    Comment: social     Social History     Substance and Sexual Activity   Drug Use No     Social History     Tobacco Use   Smoking Status Never Smoker   Smokeless Tobacco Never Used       Family History:   Family History   Problem Relation Age of Onset    Stroke Mother         stroke syndrome    Cancer Maternal Grandmother        Medications:  Pertinent medications were reviewed        Allergies   Allergen Reactions    Penicillins Hives         Vitals: There were no vitals taken for this visit  There is no height or weight on file to calculate BMI    ,    ,        No intake or output data in the 24 hours ending 07/11/19 1546  Invasive Devices     Peripheral Intravenous Line            Peripheral IV 07/11/19 Left Antecubital less than 1 day                Physical Exam:  Gen: Alert and cooperative  In no acute distress  HE: Normocephalic, atraumatic  Pupils equal 4mm brisk reaction to light  ENT:Ears with no obvious hearing impairment  Nasal septum midline  Oropharynx clear with no exudate  Mucus membrane moist  Atraumatic, no facial droop  Neck/lymph: No carotid bruit  No JVD, no adneopathy  Chest: Lungs clear bilaterally  No rales, wheezes or rhonchi  No use of accessory muscles  Cor: S1 S2 regular rate and rhythm   No murmurs, rubs, or gallops  Abd: Soft, non-tender  NT/ND  Ext: No edema  Right sided lower extremity weakness 4/5, right upper arm strength equal except for right hand 4/5 strength  Neuro: Oriented to person, place, and time  No facial droop, no tongue deviation, right arm drift, right extremity weakness as noted above, equal sensation bilaterally on 4 extremities    Skin: No rash or lesions  Warm and dry  Diagnostic Data:  Lab: I have personally reviewed pertinent lab results  ,   CBC:  Results from last 7 days   Lab Units 07/11/19  1213   WBC Thousand/uL 12 28*   HEMOGLOBIN g/dL 15 6*   HEMATOCRIT % 47 5*   PLATELETS Thousands/uL 338      CMP:   Lab Results   Component Value Date    SODIUM 140 07/11/2019    K 3 9 07/11/2019     07/11/2019    CO2 26 07/11/2019    BUN 22 07/11/2019    CREATININE 0 71 07/11/2019    CALCIUM 10 1 07/11/2019    EGFR 93 07/11/2019   ,   PT/INR:   Lab Results   Component Value Date    INR 1 01 07/11/2019   ,   Troponin: No results found for: TROPONINI,   Magnesium: No components found for: MAG,  Phosphorous: No results found for: PHOS    ABG: No results found for: PHART, EJO0FTF, PO2ART, FWH4CUD, E7HIYKYD, BEART, SOURCE,     Microbiology:  None  Imaging: I have personally reviewed the pertinent imaging studies on the PACS system   7/11 CT stroke alert Brain: No acute intracranial CT abnormality  7/11 CTA head/neck shows no intracranial large vessel occlusion/crtical stenosis  A few sub-4mm nodules in partially imaged right upper lobe  7/11/ Chest x-ray No acute cardiopulmonary disease  EKG/telemetry/Echo:   Echo pending      VTE Prophylaxis: Sequential compression device (Venodyne)     Code Status: No Order    Portions of the record may have been created with voice recognition software  Occasional wrong word or "sound a like" substitutions may have occurred due to the inherent limitations of voice recognition software   Read the chart carefully and recognize, using context, where substitutions have occurred

## 2019-07-11 NOTE — ASSESSMENT & PLAN NOTE
From the description provided by the patient and her  as well as the documentation from [address] it appears as if this patient was experiencing significant right arm followed by right leg weakness  Apparently she never had right facial weakness  She was given tPA and her stroke was aborted somewhat and by her report and exam at this time she has had good return of function in the right leg she has generally good return of function in the right arm with the exception of a deficits still in terms a weakness at the right wrist and the hand  She continues to have no bulbar involvement  She has tolerated the tPA administration well to this point    Overnight she will be monitored and treatment will include the following:  Repeat CT of head 24 Hrs post TPA  24 hours post tpa ok for ASA if no bleed  Secondary risk factor modification - started on Lipitor and blood pressure control when cleared by speech and taking PO-   Avoid hypo/hypertension -  blood pressure goal 160 initially   BS control  MRI of brain pending  Echo pending   Aspiration precautions  DVT prevention  Neurological checks; notify neurology or critical care team with any changes  Aggressive therapies - depending on the improvement in her right hand given that it is her dominant hand she may benefit from PMR consultation

## 2019-07-11 NOTE — TELEMEDICINE
TeleConsultation - Stroke   Yaritza Duarte 59 y o  female MRN: [id number]   Encounter: [id number]      REQUIRED DOCUMENTATION:     1  This service was provided via Telemedicine  2  Provider located at home  3  TeleMed provider: Dale Chiang MD   4  Identify all parties in room with patient during tele consult:  , Dr Phyllis Coles  5  After connecting through Locality, patient was identified by name and date of birth and assistant checked wristband  Patient was then informed that this was a Telemedicine visit and that the exam was being conducted confidentially over secure lines  My office door was closed  No one else was in the room  Patient acknowledged consent and understanding of privacy and security of the Telemedicine visit, and gave us permission to have the assistant stay in the room in order to assist with the history and to conduct the exam   I informed the patient that I have reviewed their record in Epic and presented the opportunity for them to ask any questions regarding the visit today  The patient agreed to participate  Assessment/Plan   Assessment:   Right hemiplegia arm>leg, likely due to stroke  Normal CT and CTA h&n  TPA Decision: TPA given this admission  After a discussion of risks and benefits reviewing inclusion and exclusion criteria the decision was made to proceed with thrombolytic therapy  Verbal consent was obtained from  the patient  Plan:   Post tPA protocol: no aspirin or heparin for 24 hours  Repeat head imaging in 24h  Maintain SBP<180  MRI brain, TTE, LDL, HgbA1c and TSH  Start Atorvastatin 40 mg  PT/OT    History of Present Illness     Reason for Consult / Principal Problem: stroke alert  Patient last known well: 11:00 am  Stroke alert called: prehospital  Neurology time of arrival: 12:10  HPI: Yaritza Duarte is a 59 y o  female who presents with sudden onset right arm weakness at 11 am  Shortly after she noted weakness of right leg as well   No aphasia, blurriness of vision or other complaints  No prior history  She has dyslipidemia  Her mother  of an early stroke in her 46s  Consult to Neurology  Consult performed by: Anjelica Sena MD  Consult ordered by: Chai Santana MD        Review of Systems  Complete ROS was done and is negative other than what is mentioned in HPI    Historical Information   Past Medical History:   Diagnosis Date    Bladder stone     Ureteral calculus, right      Past Surgical History:   Procedure Laterality Date    GALLBLADDER SURGERY      onset:      Social History   Social History     Substance and Sexual Activity   Alcohol Use Yes    Frequency: Monthly or less    Drinks per session: 1 or 2    Binge frequency: Never    Comment: social     Social History     Substance and Sexual Activity   Drug Use No     Social History     Tobacco Use   Smoking Status Never Smoker   Smokeless Tobacco Never Used     Family History: as mentioned above    Review of previous medical records was completed  Meds/Allergies   PTA meds:   Prior to Admission Medications   Prescriptions Last Dose Informant Patient Reported? Taking? Ascorbic Acid (VITAMIN C) 100 MG tablet 2019 at Unknown time  Yes Yes   Sig: Take by mouth   Pseudoephedrine-Guaifenesin (MUCINEX D PO) 2019 at Unknown time Self Yes Yes   Sig: Take 1,200 mg by mouth   loratadine (CLARITIN) 10 mg tablet Not Taking at Unknown time  No No   Sig: Take 1 tablet (10 mg total) by mouth daily   Patient not taking: Reported on 2019   lovastatin (MEVACOR) 40 MG tablet 7/10/2019 at Unknown time  No Yes   Sig: Take 1 tablet by mouth once daily      Facility-Administered Medications: None       Allergies   Allergen Reactions    Penicillins Hives       Objective   Vitals:Blood pressure 153/66, pulse 102, temperature 98 3 °F (36 8 °C), temperature source Temporal, resp  rate 16, weight 68 7 kg (151 lb 7 3 oz), SpO2 96 %  ,Body mass index is 28 62 kg/m²    No intake or output data in the 24 hours ending 19 1329    Invasive Devices: Invasive Devices     Peripheral Intravenous Line            Peripheral IV 19 Left Antecubital less than 1 day                Physical Exam NAD  Skin: no seen lesions  HEENT: ATNC  Chest: breathing comfortably on room air  GI: no apparent distension  Neurologic Exam  Alert and oriented for self, place and time  Memory is intact to recent and remote events  Language is intact to comprehension and expression  No neglect  Speech is normal  EOMI  Pupils are symmetric  Visual field appears intact  Face is symmetric  Tongue is midline  Able to move all extremities against gravity, but has clear difficulty in RUE and some difficulty in RLE  Some dysmetria of righ hand persists even when her arm is supported  NIHSS:  1a Level of Consciousness: 0 = Alert   1b  LOC Questions: 0 = Answers both correctly   1c  LOC Commands: 0 = Obeys both correctly   2  Best Gaze: 0 = Normal   3  Visual: 0 = No visual field loss   4  Facial Palsy: 0=Normal symmetric movement   5a  Motor Right Arm: 2=Some effort against gravity, limb cannot get to or maintain (if cured) 90 (or 45) degrees, drifts down to bed, but has some effort against gravity   5b  Motor Left Arm: 0=No drift, limb holds 90 (or 45) degrees for full 10 seconds   6a  Motor Right Le=Drift, limb holds 90 (or 45) degrees but drifts down before full 10 seconds: does not hit bed   6b  Motor Left Le=No drift, limb holds 90 (or 45) degrees for full 10 seconds   7  Limb Ataxia:  1=Present in one limb   8  Sensory: 0=Normal; no sensory loss   9  Best Language:  0=No aphasia, normal   10  Dysarthria: 0=Normal articulation   11   Extinction and Inattention (formerly Neglect): 0=No abnormality   Total Score: 4     Time NIHSS was completed: 1:00 pm    Lab Results:   CBC:   Results from last 7 days   Lab Units 19  1213   WBC Thousand/uL 12 28*   RBC Million/uL 5 04   HEMOGLOBIN g/dL 15 6*   HEMATOCRIT % 47 5*   MCV fL 94   PLATELETS Thousands/uL 338   , BMP/CMP:   Results from last 7 days   Lab Units 07/11/19  1213   SODIUM mmol/L 140   POTASSIUM mmol/L 3 9   CHLORIDE mmol/L 105   CO2 mmol/L 26   BUN mg/dL 22   CREATININE mg/dL 0 71   CALCIUM mg/dL 10 1   EGFR ml/min/1 73sq m 93   , TSH:   , Coagulation:   Results from last 7 days   Lab Units 07/11/19  1213   INR  1 01   , Lipid Profile:     Imaging Studies: I have personally reviewed pertinent films in PACS  EKG, Pathology, and Other Studies: I have personally reviewed pertinent films in PACS

## 2019-07-11 NOTE — PLAN OF CARE
Problem: Prexisting or High Potential for Compromised Skin Integrity  Goal: Skin integrity is maintained or improved  Description  INTERVENTIONS:  - Identify patients at risk for skin breakdown  - Assess and monitor skin integrity  - Assess and monitor nutrition and hydration status  - Monitor labs (i e  albumin)  - Assess for incontinence   - Turn and reposition patient  - Assist with mobility/ambulation  - Relieve pressure over bony prominences  - Avoid friction and shearing  - Provide appropriate hygiene as needed including keeping skin clean and dry  - Evaluate need for skin moisturizer/barrier cream  - Collaborate with interdisciplinary team (i e  Nutrition, Rehabilitation, etc )   - Patient/family teaching  Outcome: Progressing     Problem: Neurological Deficit  Goal: Neurological status is stable or improving  Description  Interventions:  - Monitor and assess patient's level of consciousness, motor function, sensory function, and level of assistance needed for ADLs  - Monitor and report changes from baseline  Collaborate with interdisciplinary team to initiate plan and implement interventions as ordered  - Provide and maintain a safe environment  - Utilize seizure precautions  - Utilize fall precautions  - Utilize aspiration precautions  - Utilize bleeding precautions  Outcome: Progressing     Problem: Activity Intolerance/Impaired Mobility  Goal: Mobility/activity is maintained at optimum level for patient  Description  Interventions:  - Assess and monitor patient  barriers to mobility and need for assistive/adaptive devices  - Assess patient's emotional response to limitations  - Collaborate with interdisciplinary team and initiate plans and interventions as ordered  - Encourage independent activity per ability   - Maintain proper body alignment  - Perform active/passive rom as tolerated/ordered    - Plan activities to conserve energy   - Turn patient  Outcome: Progressing     Problem: PAIN - ADULT  Goal: Verbalizes/displays adequate comfort level or baseline comfort level  Description  Interventions:  - Encourage patient to monitor pain and request assistance  - Assess pain using appropriate pain scale  - Administer analgesics based on type and severity of pain and evaluate response  - Implement non-pharmacological measures as appropriate and evaluate response  - Consider cultural and social influences on pain and pain management  - Notify physician/advanced practitioner if interventions unsuccessful or patient reports new pain  Outcome: Progressing     Problem: INFECTION - ADULT  Goal: Absence or prevention of progression during hospitalization  Description  INTERVENTIONS:  - Assess and monitor for signs and symptoms of infection  - Monitor lab/diagnostic results  - Monitor all insertion sites, i e  indwelling lines, tubes, and drains  - Monitor endotracheal (as able) and nasal secretions for changes in amount and color  - [place] appropriate cooling/warming therapies per order  - Administer medications as ordered  - Instruct and encourage patient and family to use good hand hygiene technique  - Identify and instruct in appropriate isolation precautions for identified infection/condition  Outcome: Progressing  Goal: Absence of fever/infection during neutropenic period  Description  INTERVENTIONS:  - Monitor WBC  - Implement neutropenic guidelines  Outcome: Progressing     Problem: SAFETY ADULT  Goal: Patient will remain free of falls  Description  INTERVENTIONS:  - Assess patient frequently for physical needs  -  Identify cognitive and physical deficits and behaviors that affect risk of falls    -  [place] fall precautions as indicated by assessment   - Educate patient/family on patient safety including physical limitations  - Instruct patient to call for assistance with activity based on assessment  - Modify environment to reduce risk of injury  - Consider OT/PT consult to assist with strengthening/mobility  Outcome: Progressing  Goal: Maintain or return to baseline ADL function  Description  INTERVENTIONS:  -  Assess patient's ability to carry out ADLs; assess patient's baseline for ADL function and identify physical deficits which impact ability to perform ADLs (bathing, care of mouth/teeth, toileting, grooming, dressing, etc )  - Assess/evaluate cause of self-care deficits   - Assess range of motion  - Assess patient's mobility; develop plan if impaired  - Assess patient's need for assistive devices and provide as appropriate  - Encourage maximum independence but intervene and supervise when necessary  ¯ Involve family in performance of ADLs  ¯ Assess for home care needs following discharge   ¯ Request OT consult to assist with ADL evaluation and planning for discharge  ¯ Provide patient education as appropriate  Outcome: Progressing  Goal: Maintain or return mobility status to optimal level  Description  INTERVENTIONS:  - Assess patient's baseline mobility status (ambulation, transfers, stairs, etc )    - Identify cognitive and physical deficits and behaviors that affect mobility  - Identify mobility aids required to assist with transfers and/or ambulation (gait belt, sit-to-stand, lift, walker, cane, etc )  - [place] fall precautions as indicated by assessment  - Record patient progress and toleration of activity level on Mobility SBAR; progress patient to next Phase/Stage  - Instruct patient to call for assistance with activity based on assessment  - Request Rehabilitation consult to assist with strengthening/weightbearing, etc   Outcome: Progressing     Problem: DISCHARGE PLANNING  Goal: Discharge to home or other facility with appropriate resources  Description  INTERVENTIONS:  - Identify barriers to discharge w/patient and caregiver  - Arrange for needed discharge resources and transportation as appropriate  - Identify discharge learning needs (meds, wound care, etc )  - Arrange for interpretive services to assist at discharge as needed  - Refer to Case Management Department for coordinating discharge planning if the patient needs post-hospital services based on physician/advanced practitioner order or complex needs related to functional status, cognitive ability, or social support system  Outcome: Progressing     Problem: Knowledge Deficit  Goal: Patient/family/caregiver demonstrates understanding of disease process, treatment plan, medications, and discharge instructions  Description  Complete learning assessment and assess knowledge base  Interventions:  - Provide teaching at level of understanding  - Provide teaching via preferred learning methods  Outcome: Progressing     Problem: NEUROSENSORY - ADULT  Goal: Achieves stable or improved neurological status  Description  INTERVENTIONS  - Monitor and report changes in neurological status  - Initiate measures to prevent increased intracranial pressure  - Maintain blood pressure and fluid volume within ordered parameters to optimize cerebral perfusion  - Monitor temperature, glucose, and sodium or any other associated labs  Initiate appropriate interventions as ordered  - Monitor for seizure activity   - Administer anti-seizure medications as ordered  Outcome: Progressing     Problem: CARDIOVASCULAR - ADULT  Goal: Maintains optimal cardiac output and hemodynamic stability  Description  INTERVENTIONS:  - Monitor I/O, vital signs and rhythm  - Monitor for S/S and trends of decreased cardiac output i e  bleeding, hypotension  - Administer and titrate ordered vasoactive medications to optimize hemodynamic stability  - Assess quality of pulses, skin color and temperature  - Assess for signs of decreased coronary artery perfusion - ex   Angina  - Instruct patient to report change in severity of symptoms  Outcome: Progressing  Goal: Absence of cardiac dysrhythmias or at baseline rhythm  Description  INTERVENTIONS:  - Continuous cardiac monitoring, monitor vital signs, obtain 12 lead EKG if indicated  - Administer antiarrhythmic and heart rate control medications as ordered  - Monitor electrolytes and administer replacement therapy as ordered  Outcome: Progressing     Problem: SKIN/TISSUE INTEGRITY - ADULT  Goal: Skin integrity remains intact  Description  INTERVENTIONS  - Identify patients at risk for skin breakdown  - Assess and monitor skin integrity  - Assess and monitor nutrition and hydration status  - Monitor labs (i e  albumin)  - Assess for incontinence   - Turn and reposition patient  - Assist with mobility/ambulation  - Relieve pressure over bony prominences  - Avoid friction and shearing  - Provide appropriate hygiene as needed including keeping skin clean and dry  - Evaluate need for skin moisturizer/barrier cream  - Collaborate with interdisciplinary team (i e  Nutrition, Rehabilitation, etc )   - Patient/family teaching  Outcome: Progressing  Goal: Incision(s), wounds(s) or drain site(s) healing without S/S of infection  Description  INTERVENTIONS  - Assess and document risk factors for skin impairment   - Assess and document dressing, incision, wound bed, drain sites and surrounding tissue  - Initiate Nutrition services consult and/or wound management as needed  Outcome: Progressing  Goal: Oral mucous membranes remain intact  Description  INTERVENTIONS  - Assess oral mucosa and hygiene practices  - Implement preventative oral hygiene regimen  - Implement oral medicated treatments as ordered  - Initiate Nutrition services referral as needed  Outcome: Progressing     Problem: MUSCULOSKELETAL - ADULT  Goal: Maintain or return mobility to safest level of function  Description  INTERVENTIONS:  - Assess patient's ability to carry out ADLs; assess patient's baseline for ADL function and identify physical deficits which impact ability to perform ADLs (bathing, care of mouth/teeth, toileting, grooming, dressing, etc )  - Assess/evaluate cause of self-care deficits   - Assess range of motion  - Assess patient's mobility; develop plan if impaired  - Assess patient's need for assistive devices and provide as appropriate  - Encourage maximum independence but intervene and supervise when necessary  - Involve family in performance of ADLs  - Assess for home care needs following discharge   - Request OT consult to assist with ADL evaluation and planning for discharge  - Provide patient education as appropriate  Outcome: Progressing  Goal: Maintain proper alignment of affected body part  Description  INTERVENTIONS:  - Support, maintain and protect limb and body alignment  - Provide pt/fam with appropriate education  Outcome: Progressing

## 2019-07-11 NOTE — EMTALA/ACUTE CARE TRANSFER
[address] [facility] EMERGENCY DEPARTMENT  [facility] [address]  Dept: [address] [place] TRANSFER CONSENT    NAME Carrie Ramires                                         1960                              MRN [id number]    I have been informed of my rights regarding examination, treatment, and transfer   by Dr Isabel Solorzano MD    Benefits: Specialized equipment and/or services available at the receiving facility (Include comment)________________________(ICU, intensivist, Neurology)    Risks: Potential for delay in receiving treatment, Potential deterioration of medical condition, Loss of IV, Increased discomfort during transfer      Transfer Request   I acknowledge that my medical condition has been evaluated and explained to me by the emergency department physician or other qualified medical person and/or my attending physician who has recommended and offered to me further medical examination and treatment  I understand the Hospital's obligation with respect to the treatment and stabilization of my emergency medical condition  I nevertheless request to be transferred  I release the Hospital, the doctor, and any other persons caring for me from all responsibility or liability for any injury or ill effects that may result from my transfer and agree to accept all responsibility for the consequences of my choice to transfer, rather than receive stabilizing treatment at the Hospital  I understand that because the transfer is my request, my insurance may not provide reimbursement for the services  The Hospital will assist and direct me and my family in how to make arrangements for transfer, but the hospital is not liable for any fees charged by the transport service  In spite of this understanding, I refuse to consent to further medical examination and treatment which has been offered to me, and request transfer to  Jaja Champion Name, [address] : [place]   I authorize the performance of emergency medical procedures and treatments upon me in both transit and upon arrival at the receiving facility  Additionally, I authorize the release of any and all medical records to the receiving facility and request they be transported with me, if possible  I authorize the performance of emergency medical procedures and treatments upon me in both transit and upon arrival at the receiving facility  Additionally, I authorize the release of any and all medical records to the receiving facility and request they be transported with me, if possible  I understand that the safest mode of transportation during a medical emergency is an ambulance and that the Hospital advocates the use of this mode of transport  Risks of traveling to the receiving facility by car, including absence of medical control, life sustaining equipment, such as oxygen, and medical personnel has been explained to me and I fully understand them  (MARSHAL CORRECT BOX BELOW)  [  ]  I consent to the stated transfer and to be transported by ambulance/helicopter  [  ]  I consent to the stated transfer, but refuse transportation by ambulance and accept full responsibility for my transportation by car  I understand the risks of non-ambulance transfers and I exonerate the Hospital and its staff from any deterioration in my condition that results from this refusal     X___________________________________________    DATE  19  TIME________  Signature of patient or legally responsible individual signing on patient behalf           RELATIONSHIP TO PATIENT_________________________          Provider Certification    NAME Delores Schaumann                                        [facility] 1960                              MRN [id number]    A medical screening exam was performed on the above named patient    Based on the examination:    Condition Necessitating Transfer The encounter diagnosis was Acute CVA (cerebrovascular accident) ([facility] )  Patient Condition: The patient has been stabilized such that within reasonable medical probability, no material deterioration of the patient condition or the condition of the unborn child(yuliya) is likely to result from the transfer    Reason for Transfer: Level of Care needed not available at this facility    Transfer Requirements: [address]   · Space available and qualified personnel available for treatment as acknowledged by    · Agreed to accept transfer and to provide appropriate medical treatment as acknowledged by       SYLVIA SMART  [facility]  · Appropriate medical records of the examination and treatment of the patient are provided at the time of transfer   [address] _______  · Transfer will be performed by qualified personnel from    and appropriate transfer equipment as required, including the use of necessary and appropriate life support measures  Provider Certification: I have examined the patient and explained the following risks and benefits of being transferred/refusing transfer to the patient/family:  General risk, such as traffic hazards, adverse weather conditions, rough terrain or turbulence, possible failure of equipment (including vehicle or aircraft), or consequences of actions of persons outside the control of the transport personnel      Based on these reasonable risks and benefits to the patient and/or the unborn child(yuliya), and based upon the information available at the time of the patients examination, I certify that the medical benefits reasonably to be expected from the provision of appropriate medical treatments at another medical facility outweigh the increasing risks, if any, to the individuals medical condition, and in the case of labor to the unborn child, from effecting the transfer      X____________________________________________ DATE 07/11/19        TIME_______      ORIGINAL - SEND TO MEDICAL RECORDS   COPY - SEND WITH PATIENT DURING TRANSFER

## 2019-07-11 NOTE — ED PROVIDER NOTES
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 Taking? Ascorbic Acid (VITAMIN C) 100 MG tablet 7/11/2019 at Unknown time  Yes Yes   Sig: Take by mouth   Pseudoephedrine-Guaifenesin (MUCINEX D PO) 7/11/2019 at Unknown time Self Yes Yes   Sig: Take 1,200 mg by mouth   loratadine (CLARITIN) 10 mg tablet Not Taking at Unknown time  No No   Sig: Take 1 tablet (10 mg total) by mouth daily   Patient not taking: Reported on 7/11/2019   lovastatin (MEVACOR) 40 MG tablet 7/10/2019 at Unknown time  No Yes   Sig: Take 1 tablet by mouth once daily      Facility-Administered Medications: None       Past Medical History:   Diagnosis Date    Bladder stone     Ureteral calculus, right        Past Surgical History:   Procedure Laterality Date    GALLBLADDER SURGERY      onset: 2011       Family History   Problem Relation Age of Onset    Stroke Mother         stroke syndrome    Cancer Maternal Grandmother      I have reviewed and agree with the history as documented      Social History     Tobacco Use    Smoking status: Never Smoker    Smokeless tobacco: Never Used   Substance Use Topics    Alcohol use: Yes     Frequency: Monthly or less     Drinks per session: 1 or 2     Binge frequency: Never     Comment: social    Drug use: No        Review of Systems   Constitutional: Negative  Negative for chills and fever  HENT: Negative  Negative for congestion and sore throat  Eyes: Negative  Negative for discharge and redness  Respiratory: Negative  Negative for chest tightness and shortness of breath  Cardiovascular: Negative  Negative for chest pain and palpitations  Gastrointestinal: Negative  Negative for abdominal pain, nausea and vomiting  Endocrine: Negative  Negative for cold intolerance and polyphagia  Genitourinary: Negative  Negative for difficulty urinating and dysuria  Musculoskeletal: Negative  Negative for arthralgias and back pain  Skin: Negative  Negative for color change and wound  Allergic/Immunologic: Negative  Negative for environmental allergies  Neurological: Positive for weakness  Negative for dizziness, seizures, facial asymmetry, speech difficulty, numbness and headaches  Hematological: Negative  Psychiatric/Behavioral: Negative  Negative for behavioral problems  The patient is not nervous/anxious  All other systems reviewed and are negative  Physical Exam  Physical Exam   Constitutional: She is oriented to person, place, and time  She appears well-developed and well-nourished  No distress  HENT:   Head: Normocephalic and atraumatic  Right Ear: External ear normal    Left Ear: External ear normal    Mouth/Throat: Oropharynx is clear and moist    Eyes: Pupils are equal, round, and reactive to light  Conjunctivae and EOM are normal  Right eye exhibits no discharge  Left eye exhibits no discharge  No scleral icterus  Neck: Normal range of motion  Neck supple  No tracheal deviation present  No thyromegaly present  Cardiovascular: Normal rate, regular rhythm and intact distal pulses  Exam reveals no gallop and no friction rub     No murmur heard  Pulmonary/Chest: Effort normal and breath sounds normal  No stridor  No respiratory distress  She has no wheezes  She has no rales  Abdominal: Soft  Bowel sounds are normal  She exhibits no distension  There is no tenderness  There is no rebound and no guarding  Musculoskeletal: Normal range of motion  She exhibits no edema or deformity  Neurological: She is alert and oriented to person, place, and time  No cranial nerve deficit or sensory deficit  She exhibits abnormal muscle tone  Coordination abnormal    Skin: Skin is warm and dry  No rash noted  She is not diaphoretic  No erythema  Psychiatric: She has a normal mood and affect  Her behavior is normal  Thought content normal    Nursing note and vitals reviewed        Vital Signs  ED Triage Vitals   Temperature Pulse Respirations Blood Pressure SpO2   07/11/19 1215 07/11/19 1215 07/11/19 1215 07/11/19 1215 07/11/19 1206   98 3 °F (36 8 °C) 105 18 (!) 173/130 98 %      Temp Source Heart Rate Source Patient Position - Orthostatic VS BP Location FiO2 (%)   07/11/19 1215 07/11/19 1215 07/11/19 1325 07/11/19 1325 --   Temporal Monitor Lying Left arm       Pain Score       --                  Vitals:    07/11/19 1325 07/11/19 1340 07/11/19 1355 07/11/19 1410   BP: 153/66 152/76 147/68 150/94   Pulse: 102 (!) 110 99 104   Patient Position - Orthostatic VS: Lying Lying Lying Lying         Visual Acuity  Visual Acuity      Most Recent Value   L Pupil Size (mm)  4   R Pupil Size (mm)  4          ED Medications  Medications   Labetalol HCl (NORMODYNE) injection 10 mg (has no administration in time range)   iohexol (OMNIPAQUE) 350 MG/ML injection (MULTI-DOSE) 100 mL (100 mL Intravenous Given 7/11/19 1231)   alteplase (ACTIVASE) bolus 6 2 mg (6 2 mg Intravenous Given 7/11/19 1308)   alteplase (ACTIVASE) infusion 55 6 mg (0 mg/kg × 68 7 kg Intravenous Stopped 7/11/19 1404)       Diagnostic Studies  Results Reviewed     Procedure Component Value Units Date/Time    [place] draw [[id number]] Collected:  07/11/19 1215    Lab Status:  Final result Specimen:  Blood Updated:  07/11/19 1401    Narrative: The following orders were created for panel order [place] draw  Procedure                               Abnormality         Status                     ---------                               -----------         ------                     [place] [employer] top on [id number][[id number]]                                                            Green / Black tube on RQCV[[id number]]                       Final result               Lavender Top 7ml on FYEF[[id number]]                                                      Please view results for these tests on the individual orders      Basic metabolic panel [[id number]] Collected:  07/11/19 1213    Lab Status:  Final result Specimen:  Blood from Arm, Right Updated:  07/11/19 1228     Sodium 140 mmol/L      Potassium 3 9 mmol/L      Chloride 105 mmol/L      CO2 26 mmol/L      ANION GAP 9 mmol/L      BUN 22 mg/dL      Creatinine 0 71 mg/dL      Glucose 101 mg/dL      Calcium 10 1 mg/dL      eGFR 93 ml/min/1 73sq m     Narrative:       [place] guidelines for Chronic Kidney Disease (CKD):     Stage 1 with normal or high GFR (GFR > 90 mL/min/1 73 square meters)    Stage 2 Mild CKD (GFR = 60-89 mL/min/1 73 square meters)    Stage 3A Moderate CKD (GFR = 45-59 mL/min/1 73 square meters)    Stage 3B Moderate CKD (GFR = 30-44 mL/min/1 73 square meters)    Stage 4 Severe CKD (GFR = 15-29 mL/min/1 73 square meters)    Stage 5 End Stage CKD (GFR <15 mL/min/1 73 square meters)  Note: GFR calculation is accurate only with a steady state creatinine    Protime-INR [[id number]]  (Normal) Collected:  07/11/19 1213    Lab Status:  Final result Specimen:  Blood from Arm, Right Updated:  07/11/19 1228     Protime 13 3 seconds      INR 1 01    APTT [[id number]]  (Normal) Collected:  07/11/19 1213    Lab Status:  Final result Specimen:  Blood from Arm, Right Updated:  07/11/19 1228     PTT 29 seconds     CBC and Platelet [[id number]]  (Abnormal) Collected:  07/11/19 1213    Lab Status:  Final result Specimen:  Blood from Arm, Right Updated:  07/11/19 1219     WBC 12 28 Thousand/uL      RBC 5 04 Million/uL      Hemoglobin 15 6 g/dL      Hematocrit 47 5 %      MCV 94 fL      MCH 31 0 pg      MCHC 32 8 g/dL      RDW 12 8 %      Platelets 406 Thousands/uL      MPV 8 9 fL                  X-ray chest 1 view portable   Final Result by Lauren Blanco MD (07/11 1400)      No acute cardiopulmonary disease  Workstation performed: [id number]         CT stroke alert brain   Final Result by Kasey Ratliff MD (07/11 1247)      No acute intracranial CT abnormality            Findings were directly discussed with Nikhil Huynh on 7/11/2019 12:35 PM       Workstation performed: [id number]         CTA stroke alert (head/neck)   Final Result by Kasey Ratliff MD (07/11 1250)      1  No intracranial large vessel occlusion/critical stenosis  2   Unremarkable CT angiogram of the neck      3  A few sub-4 mm nodules in partially imaged right upper lobe  Based on current Fleischner Society 2017 Guidelines on incidental pulmonary nodule, no routine follow-up is needed if the patient is considered low risk for lung cancer  If the patient is    considered high risk for lung cancer, 12 month follow-up non-contrast chest CT is recommended                     Findings were directly discussed with Nikhil Huynh on 7/11/2019 12:35 PM                      Workstation performed: [id number]                    Procedures  CriticalCare Time  Performed by: Parth Tavarez MD  Authorized by: Parth Tavarez MD     Critical care provider statement:     Critical care time (minutes):  30    Critical care time was exclusive of:  Separately billable procedures and treating other patients    Critical care was necessary to treat or prevent imminent or life-threatening deterioration of the following conditions:  CNS failure or compromise    Critical care was time spent personally by me on the following activities:  Blood draw for specimens, obtaining history from patient or surrogate, development of treatment plan with patient or surrogate, discussions with consultants, examination of patient, ordering and performing treatments and interventions, ordering and review of laboratory studies, ordering and review of radiographic studies and re-evaluation of patient's condition           ED Course  ED Course as of Jul 11 1422   Thu Jul 11, 2019   1236 Is on-call stroke radiologist called, patient has no bleed, no large vessel occlusion  From a radiology standpoint, there is nothing on the scan that would keep the patient from getting tPA  1258 Patient was evaluated by on-call stroke neurologist, Dr Cassia Kraus  His recommendation was tPA  Risks benefits alternatives of tPA were discussed with the patient  Patient would like to proceed with tPA  Patient's blood pressure at this moment time is 150s over 90s  Will do ever 5 minutes blood pressures  Orders out for transfer center to get the patient to an ICU      1259 I also discussed with pharmacy, this is the appropriate dose of tPA for the patient  1309 Patient access called back, likely going to [employer]  [phone] Patient is transferred downtown              HEART Risk Score      Most Recent Value   History  0 Filed at: 07/11/2019 1330   ECG  0 Filed at: 07/11/2019 1330   Age  1 Filed at: 07/11/2019 1330   Risk Factors  0 Filed at: 07/11/2019 1330   Troponin  0 Filed at: 07/11/2019 1330   Heart Score Risk Calculator   History  0 Filed at: 07/11/2019 1330   ECG  0 Filed at: 07/11/2019 1330   Age  1 Filed at: 07/11/2019 1330   Risk Factors  0 Filed at: 07/11/2019 1330   Troponin  0 Filed at: 07/11/2019 1330   HEART Score  1 Filed at: 07/11/2019 1330   HEART Score  1 Filed at: 07/11/2019 1330          Stroke Assessment [address] Name 07/11/19 1328             NIH Stroke Scale    Interval  Baseline      Level of Consciousness (1a )  0      LOC Questions (1b )  0      LOC Commands (1c )  1 Cannot  hands on the right      Best Gaze (2 )  0      Visual (3 )  0      Facial Palsy (4 )  0      Motor Arm, Left (5a )  0      Motor Arm, Right (5b )  1      Motor Leg, Left (6a )  0      Motor Leg, Right (6b )  1      Limb Ataxia (7 )  2      Sensory (8 )  0      Best Language (9 )  0      Dysarthria (10 )  0      Extinction and Inattention (11 ) (Formerly Neglect)  0      Total  5          First Filed Value   TPA Decision  TPA given this admission  After a discussion of risks and benefits reviewing inclusion and exclusion criteria the decision was made to proceed with thrombolytic therapy  Verbal consent was obtained from   TPA consent options  the patient  MDM  Number of Diagnoses or Management Options  Acute CVA (cerebrovascular accident) [facility]):   Diagnosis management comments: 8year-old female presents as a pre-hospital stroke alert  Patient was found to have an NIH stroke scale of 5  Evaluated by tele neurology whose recommendation was tPA  TPA was given  Prior to evaluation, all of the labs were obtained for stroke alert  EKG showed sinus tachycardia  Labs were otherwise unremarkable  Patient was given tPA both bolus and drip  Patient tolerated the procedure well  Labetalol was ordered but never given just to have on hand in case patient did have hypertension while she was here  She was initially hypertensive but came down on her own prior to tPA administration        Disposition  Final diagnoses:   Acute CVA (cerebrovascular accident) [facility])     Time reflects when diagnosis was documented in both MDM as applicable and the Disposition within this note     Time User Action Codes Description Comment    7/11/2019  1:32 PM Sesar Prado [I63 9] Acute CVA (cerebrovascular accident) [facility])       ED Disposition     ED Disposition Condition Date/Time Comment    Transfer to Another Facility-In Network  Thu Jul 11, 2019  1:32 PM Lorenzo Cardenas should be transferred out to [facility]         MD Documentation      Most Recent Value   Patient Condition  The patient has been stabilized such that within reasonable medical probability, no material deterioration of the patient condition or the condition of the unborn child(yuliya) is likely to result from the transfer   Reason for Transfer  Level of Care needed not available at this facility   Benefits of Transfer  Specialized equipment and/or services available at the receiving facility (Include comment)________________________ Sobia Pandey, intensivist, Neurology]   Risks of Transfer  Potential for delay in receiving treatment, Potential deterioration of medical condition, Loss of IV, Increased discomfort during transfer   Accepting Physician  Dr Geronimo De La Rosa Name, [facility]   Provider Certification  General risk, such as traffic hazards, adverse weather conditions, rough terrain or turbulence, possible failure of equipment (including vehicle or aircraft), or consequences of actions of persons outside the control of the transport personnel      RN Documentation      Most [facility] Name, [address] Assignment  ICU 11   Report Given to  Eda Rosales RN       Follow-up Information    None         Discharge Medication List as of 7/11/2019  2:20 PM      CONTINUE these medications which have NOT CHANGED    Details   Ascorbic Acid (VITAMIN C) 100 MG tablet Take by mouth, Historical Med      lovastatin (MEVACOR) 40 MG tablet Take 1 tablet by mouth once daily, Normal      Pseudoephedrine-Guaifenesin (MUCINEX D PO) Take 1,200 mg by mouth, Historical Med      loratadine (CLARITIN) 10 mg tablet Take 1 tablet (10 mg total) by mouth daily, Starting Mon 5/14/2018, No Print No discharge procedures on file      ED Provider  Electronically Signed by           Divina Sewell MD  07/11/19 7002

## 2019-07-11 NOTE — ASSESSMENT & PLAN NOTE
She reports she is on lovastatin now for some time she is compliant  We will change her to Lipitor 40 mg most likely

## 2019-07-11 NOTE — CONSULTS
Neurology Consult- Germán Butler [facility] 1960, 59 y o  female   MRN: [id number]/QHN#: ICU 6 Encounter: [id number]      Inpatient consult to Neurology  Consult performed by: LORETTA David  Consult ordered by: LORETTA Chowdhury      Reason for Consult / Principal Problem:  Paste that  Hx and PE limited by:  None  Review of previous medical records was completed  Family, specifically her  and sister, were present at the bedside for history and examination    * Stroke aborted by administration of thrombolytic agent [facility])  Assessment & Plan  From the description provided by the patient and her  as well as the documentation from [address] it appears as if this patient was experiencing significant right arm followed by right leg weakness  Apparently she never had right facial weakness  She was given tPA and her stroke was aborted somewhat and by her report and exam at this time she has had good return of function in the right leg she has generally good return of function in the right arm with the exception of a deficits still in terms a weakness at the right wrist and the hand  She continues to have no bulbar involvement  She has tolerated the tPA administration well to this point    Overnight she will be monitored and treatment will include the following:  Repeat CT of head 24 Hrs post TPA  24 hours post tpa ok for ASA if no bleed  Secondary risk factor modification - started on Lipitor and blood pressure control when cleared by speech and taking PO-   Avoid hypo/hypertension -  blood pressure goal 160 initially   BS control  MRI of brain pending  Echo pending   Aspiration precautions  DVT prevention  Neurological checks; notify neurology or critical care team with any changes  Aggressive therapies - depending on the improvement in her right hand given that it is her dominant hand she may benefit from PMR consultation      Other hyperlipidemia  Assessment & Plan  She reports she is on lovastatin now for some time she is compliant  We will change her to Lipitor 40 mg most likely  HPI: Mira Barreto is a right handed  59 y o  female who  neurology it initially sought through a tele consult at  [facility] approximately noon today for question of a stroke alert  The patient reports her story here as I examined her at [facility] approximately 5:00 p m  This afternoon  She is now approximately 3 hours post tPA  She reports that she is a right-hand dominant woman worker very few medical problems  She reports her only medication at this point is lovastatin for cholesterol imbalance  She reports that she takes several supplements  She reports she has good blood pressure control as her family doctor reports she generally does  It is in the background of this that the patient gets up every morning approximately 3:00 a m  Catarino Rushing 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  She reports that this morning she did well for the early part of her shift  She has generally done about 11 30 however it was that 11:00 a m  That she reports that she she dropped something with her right hand it completely did not hold that  She tried to pick it up and her right hand would not hold anything  She went to the intermediate nurse where she clearly had a right hand and her right arm was becoming weaker  Over the course of the 45 minutes that it took the ambulance to arrive she also developed right leg weakness  She reports that she did not have have any facial weakness  She reports that her transferred to  [facility] continued with arm and leg weakness  She had no slurred speech which she had no language dysfunction  At  [facility] she was given tPA and she reports that shortly after the yet tPA within 15-30 minutes she was able to have improved motion of her foot and then her arm    She was subsequently transferred to [place] [address] [address]  ROS: 12 system cued query:  She denies any headache visual disturbance  She reports no difficulty swallowing  She reports her right hand and her right wrist is clearly still weak  She reports that she feels her arm and her leg has returned to its normal power  She denies any chest pain or shortness of breath  She denies any palpitations she denies any sense of fluttering or a catch in her chest   No other constitutional symptoms she reports she generally healthy and well  Historical Information     Past Medical History:   Diagnosis Date    Bladder stone     Hyperlipidemia     Ureteral calculus, right      Past Surgical History:   Procedure Laterality Date    GALLBLADDER SURGERY      onset: 2011       Social History :  She is  lives with her  of a full years  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  She is a nonsmoker no alcohol no recreational is  Family History:  Her mother had a stroke at an early age, 48  Family History   Problem Relation Age of Onset    Stroke Mother         stroke syndrome    Cancer Maternal Grandmother          Allergies   Allergen Reactions    Penicillins Hives     Meds:all current active meds have been reviewed, lovastatin and supplements    Scheduled Meds:  Current Facility-Administered Medications:  acetaminophen 650 mg Oral Q6H PRN   ascorbic acid 125 mg Oral Daily   atorvastatin 40 mg Oral QPM   ondansetron 4 mg Intravenous Q6H PRN   sodium chloride 50 mL/hr Intravenous Continuous     PRN Meds:   acetaminophen    ondansetron      Physical Exam:   Objective   Vitals:Blood pressure (!) 199/115, pulse 102, temperature 98 5 °F (36 9 °C), temperature source Oral, resp  rate (!) 30, weight 63 1 kg (139 lb 1 8 oz), SpO2 96 %  ,Body mass index is 26 28 kg/m²  Patient was examined in critical care unit bed her family is present    General: alert, appears stated age and cooperative  Head: Normocephalic, without obvious abnormality, atraumatic  Oral exam: lips, mucosa, and tongue moist;   Neck: no carotid bruit,   Lungs: clear to auscultation ant  bilaterally  Heart: regular rate and rhythm, S1, S2 normal, no murmur appreciated,   Abdomen: soft, +BS    Extremities: atraumatic, no cyanosis or edema    Neurologic:   Mental status: Alert, oriented, thought content appropriate, no speech or language deficits  CN Exam:  She has a symmetrical benign cranial nerve exam   SHELLY, EOM's I, VF full, Gaze conjugate No sensory or motor lateralizations (No PP on face), Hearing I B, CNIX-XII I B  Motor: full power, age appropriate x 4 limbs with the following exceptions her wrist and her digit flexion and extension are 3/5  Her right lower extremity is 5-/5 at the hip she is 5/5 and symmetric with the left at the knee and dorsiflexion  Sensory:  Grossly intact  X 4 limbs, 4 mod inc lt, temp, vib and prop, (not PP tested), no extinction  Cerebellar: no past pointing or drift from recumbent Romberg position, no ataxia w maneuvers, Fine motor & finger taps, age appropriate, on the left she is unable to tap with her right     DTR's: Age appropriate, WNL; Plantars: downgoing left, mute on the right  Gait:  She is not gaited at this time  Lab Results:   I have personally reviewed pertinent reports    , CBC:   Results from last 7 days   Lab Units 07/11/19  1213   WBC Thousand/uL 12 28*   RBC Million/uL 5 04   HEMOGLOBIN g/dL 15 6*   HEMATOCRIT % 47 5*   MCV fL 94   PLATELETS Thousands/uL 338   , BMP/CMP:   Results from last 7 days   Lab Units 07/11/19  1213   SODIUM mmol/L 140   POTASSIUM mmol/L 3 9   CHLORIDE mmol/L 105   CO2 mmol/L 26   BUN mg/dL 22   CREATININE mg/dL 0 71   CALCIUM mg/dL 10 1   EGFR ml/min/1 73sq m 93   , Vitamin B12:   , HgBA1C:   , TSH:   , Coagulation:   Results from last 7 days   Lab Units 07/11/19  1213   INR  1 01       Imaging Studies: I have personally reviewed pertinent films in PACS and Note her initial CT scan has no significant or acute pathology  Her CTA head and neck is clear  EEG, Echo, Pathology, and Other Studies: Echocardiogram is pending  Counseling / Coordination of Care  Additional time spent today 35 minutes for teaching family updates in counseling separate from the critical care time with her admission post tPA     All of the above was discussed with the patient and her  as well as other family members at the bedside  Because of her mother's stroke at a young age which she feels is a healthy lifestyle and normal blood pressure she had several concerns about the occurrence of this event the prognosis for no further events and a full recovery of her right hand  All of these things were discussed with her in the family I believe all their current questions were answered to their satisfaction  This time was spent above and beyond the critical care assessment and medical decision making time spent separately  Dictation voice to text software has been used in the creation of this document  Please consider this in light of any contextual or grammatical errors

## 2019-07-11 NOTE — ED NOTES
Patient's  Maci Adi called and updated on patient transportation and bed assignment in [address] [place] [address]  07/11/19 9467

## 2019-07-12 ENCOUNTER — APPOINTMENT (INPATIENT)
Dept: NON INVASIVE DIAGNOSTICS | Facility: HOSPITAL | Age: 59
DRG: 065 | End: 2019-07-12
Payer: COMMERCIAL

## 2019-07-12 ENCOUNTER — APPOINTMENT (INPATIENT)
Dept: CT IMAGING | Facility: HOSPITAL | Age: 59
DRG: 065 | End: 2019-07-12
Payer: COMMERCIAL

## 2019-07-12 VITALS
HEART RATE: 88 BPM | OXYGEN SATURATION: 94 % | DIASTOLIC BLOOD PRESSURE: 87 MMHG | TEMPERATURE: 98.7 F | HEIGHT: 61 IN | BODY MASS INDEX: 25.6 KG/M2 | SYSTOLIC BLOOD PRESSURE: 137 MMHG | RESPIRATION RATE: 18 BRPM | WEIGHT: 135.58 LBS

## 2019-07-12 LAB
ANION GAP SERPL CALCULATED.3IONS-SCNC: 10 MMOL/L (ref 4–13)
ATRIAL RATE: 108 BPM
ATRIAL RATE: 110 BPM
BASOPHILS # BLD AUTO: 0.05 THOUSANDS/ΜL (ref 0–0.1)
BASOPHILS NFR BLD AUTO: 1 % (ref 0–1)
BUN SERPL-MCNC: 14 MG/DL (ref 5–25)
CALCIUM SERPL-MCNC: 9.7 MG/DL (ref 8.3–10.1)
CHLORIDE SERPL-SCNC: 106 MMOL/L (ref 100–108)
CHOLEST SERPL-MCNC: 199 MG/DL (ref 50–200)
CO2 SERPL-SCNC: 24 MMOL/L (ref 21–32)
CREAT SERPL-MCNC: 0.54 MG/DL (ref 0.6–1.3)
EOSINOPHIL # BLD AUTO: 0.05 THOUSAND/ΜL (ref 0–0.61)
EOSINOPHIL NFR BLD AUTO: 1 % (ref 0–6)
ERYTHROCYTE [DISTWIDTH] IN BLOOD BY AUTOMATED COUNT: 12.5 % (ref 11.6–15.1)
EST. AVERAGE GLUCOSE BLD GHB EST-MCNC: 114 MG/DL
GFR SERPL CREATININE-BSD FRML MDRD: 103 ML/MIN/1.73SQ M
GLUCOSE SERPL-MCNC: 90 MG/DL (ref 65–140)
HBA1C MFR BLD: 5.6 % (ref 4.2–6.3)
HCT VFR BLD AUTO: 42.8 % (ref 34.8–46.1)
HCYS SERPL-SCNC: 7.5 UMOL/L (ref 3.7–11.2)
HDLC SERPL-MCNC: 72 MG/DL (ref 40–60)
HGB BLD-MCNC: 14.5 G/DL (ref 11.5–15.4)
IMM GRANULOCYTES # BLD AUTO: 0.03 THOUSAND/UL (ref 0–0.2)
IMM GRANULOCYTES NFR BLD AUTO: 0 % (ref 0–2)
LDLC SERPL CALC-MCNC: 115 MG/DL (ref 0–100)
LYMPHOCYTES # BLD AUTO: 2.38 THOUSANDS/ΜL (ref 0.6–4.47)
LYMPHOCYTES NFR BLD AUTO: 29 % (ref 14–44)
MCH RBC QN AUTO: 31.6 PG (ref 26.8–34.3)
MCHC RBC AUTO-ENTMCNC: 33.9 G/DL (ref 31.4–37.4)
MCV RBC AUTO: 93 FL (ref 82–98)
MONOCYTES # BLD AUTO: 0.97 THOUSAND/ΜL (ref 0.17–1.22)
MONOCYTES NFR BLD AUTO: 12 % (ref 4–12)
NEUTROPHILS # BLD AUTO: 4.69 THOUSANDS/ΜL (ref 1.85–7.62)
NEUTS SEG NFR BLD AUTO: 57 % (ref 43–75)
NRBC BLD AUTO-RTO: 0 /100 WBCS
P AXIS: 34 DEGREES
P AXIS: 63 DEGREES
PLATELET # BLD AUTO: 272 THOUSANDS/UL (ref 149–390)
PMV BLD AUTO: 8.9 FL (ref 8.9–12.7)
POTASSIUM SERPL-SCNC: 3.5 MMOL/L (ref 3.5–5.3)
PR INTERVAL: 158 MS
PR INTERVAL: 175 MS
QRS AXIS: 30 DEGREES
QRS AXIS: 31 DEGREES
QRSD INTERVAL: 66 MS
QRSD INTERVAL: 71 MS
QT INTERVAL: 321 MS
QT INTERVAL: 334 MS
QTC INTERVAL: 431 MS
QTC INTERVAL: 452 MS
RBC # BLD AUTO: 4.59 MILLION/UL (ref 3.81–5.12)
SODIUM SERPL-SCNC: 140 MMOL/L (ref 136–145)
T WAVE AXIS: -7 DEGREES
T WAVE AXIS: 38 DEGREES
TRIGL SERPL-MCNC: 60 MG/DL
TSH SERPL DL<=0.05 MIU/L-ACNC: 2.28 UIU/ML (ref 0.36–3.74)
VENTRICULAR RATE: 108 BPM
VENTRICULAR RATE: 110 BPM
VIT B12 SERPL-MCNC: 621 PG/ML (ref 100–900)
WBC # BLD AUTO: 8.17 THOUSAND/UL (ref 4.31–10.16)

## 2019-07-12 PROCEDURE — 85300 ANTITHROMBIN III ACTIVITY: CPT | Performed by: NURSE PRACTITIONER

## 2019-07-12 PROCEDURE — 83090 ASSAY OF HOMOCYSTEINE: CPT | Performed by: NURSE PRACTITIONER

## 2019-07-12 PROCEDURE — 81241 F5 GENE: CPT | Performed by: NURSE PRACTITIONER

## 2019-07-12 PROCEDURE — 85306 CLOT INHIBIT PROT S FREE: CPT | Performed by: NURSE PRACTITIONER

## 2019-07-12 PROCEDURE — 70450 CT HEAD/BRAIN W/O DYE: CPT

## 2019-07-12 PROCEDURE — 85303 CLOT INHIBIT PROT C ACTIVITY: CPT | Performed by: NURSE PRACTITIONER

## 2019-07-12 PROCEDURE — 85025 COMPLETE CBC W/AUTO DIFF WBC: CPT | Performed by: NURSE PRACTITIONER

## 2019-07-12 PROCEDURE — 86146 BETA-2 GLYCOPROTEIN ANTIBODY: CPT | Performed by: NURSE PRACTITIONER

## 2019-07-12 PROCEDURE — 85670 THROMBIN TIME PLASMA: CPT | Performed by: NURSE PRACTITIONER

## 2019-07-12 PROCEDURE — G8979 MOBILITY GOAL STATUS: HCPCS

## 2019-07-12 PROCEDURE — 99238 HOSP IP/OBS DSCHRG MGMT 30/<: CPT | Performed by: NURSE PRACTITIONER

## 2019-07-12 PROCEDURE — 81240 F2 GENE: CPT | Performed by: NURSE PRACTITIONER

## 2019-07-12 PROCEDURE — 84443 ASSAY THYROID STIM HORMONE: CPT | Performed by: NURSE PRACTITIONER

## 2019-07-12 PROCEDURE — 85613 RUSSELL VIPER VENOM DILUTED: CPT | Performed by: NURSE PRACTITIONER

## 2019-07-12 PROCEDURE — 99232 SBSQ HOSP IP/OBS MODERATE 35: CPT | Performed by: PSYCHIATRY & NEUROLOGY

## 2019-07-12 PROCEDURE — 85732 THROMBOPLASTIN TIME PARTIAL: CPT | Performed by: NURSE PRACTITIONER

## 2019-07-12 PROCEDURE — 93306 TTE W/DOPPLER COMPLETE: CPT | Performed by: INTERNAL MEDICINE

## 2019-07-12 PROCEDURE — 85705 THROMBOPLASTIN INHIBITION: CPT | Performed by: NURSE PRACTITIONER

## 2019-07-12 PROCEDURE — 93010 ELECTROCARDIOGRAM REPORT: CPT | Performed by: INTERNAL MEDICINE

## 2019-07-12 PROCEDURE — 93306 TTE W/DOPPLER COMPLETE: CPT

## 2019-07-12 PROCEDURE — 97163 PT EVAL HIGH COMPLEX 45 MIN: CPT

## 2019-07-12 PROCEDURE — 80048 BASIC METABOLIC PNL TOTAL CA: CPT | Performed by: NURSE PRACTITIONER

## 2019-07-12 PROCEDURE — G8987 SELF CARE CURRENT STATUS: HCPCS

## 2019-07-12 PROCEDURE — 82607 VITAMIN B-12: CPT | Performed by: NURSE PRACTITIONER

## 2019-07-12 PROCEDURE — 83036 HEMOGLOBIN GLYCOSYLATED A1C: CPT | Performed by: NURSE PRACTITIONER

## 2019-07-12 PROCEDURE — G8988 SELF CARE GOAL STATUS: HCPCS

## 2019-07-12 PROCEDURE — 97166 OT EVAL MOD COMPLEX 45 MIN: CPT

## 2019-07-12 PROCEDURE — 85305 CLOT INHIBIT PROT S TOTAL: CPT | Performed by: NURSE PRACTITIONER

## 2019-07-12 PROCEDURE — 82652 VIT D 1 25-DIHYDROXY: CPT | Performed by: NURSE PRACTITIONER

## 2019-07-12 PROCEDURE — G8978 MOBILITY CURRENT STATUS: HCPCS

## 2019-07-12 PROCEDURE — 86147 CARDIOLIPIN ANTIBODY EA IG: CPT | Performed by: NURSE PRACTITIONER

## 2019-07-12 PROCEDURE — 80061 LIPID PANEL: CPT | Performed by: NURSE PRACTITIONER

## 2019-07-12 RX ORDER — POTASSIUM CHLORIDE 20 MEQ/1
40 TABLET, EXTENDED RELEASE ORAL ONCE
Status: COMPLETED | OUTPATIENT
Start: 2019-07-12 | End: 2019-07-12

## 2019-07-12 RX ORDER — CLOPIDOGREL BISULFATE 75 MG/1
75 TABLET ORAL DAILY
Qty: 20 TABLET | Refills: 0 | Status: SHIPPED | OUTPATIENT
Start: 2019-07-13 | End: 2020-04-30 | Stop reason: ALTCHOICE

## 2019-07-12 RX ORDER — PAROXETINE HYDROCHLORIDE 20 MG/1
10 TABLET, FILM COATED ORAL
Status: DISCONTINUED | OUTPATIENT
Start: 2019-07-12 | End: 2019-07-12 | Stop reason: HOSPADM

## 2019-07-12 RX ORDER — ASPIRIN 81 MG/1
81 TABLET ORAL DAILY
Status: DISCONTINUED | OUTPATIENT
Start: 2019-07-12 | End: 2019-07-12 | Stop reason: HOSPADM

## 2019-07-12 RX ORDER — ASPIRIN 81 MG/1
81 TABLET ORAL DAILY
Qty: 90 TABLET | Refills: 0 | Status: SHIPPED | OUTPATIENT
Start: 2019-07-13

## 2019-07-12 RX ORDER — PAROXETINE 10 MG/1
10 TABLET, FILM COATED ORAL
Qty: 30 TABLET | Refills: 0 | Status: SHIPPED | OUTPATIENT
Start: 2019-07-12 | End: 2019-07-25

## 2019-07-12 RX ORDER — CLOPIDOGREL BISULFATE 75 MG/1
75 TABLET ORAL DAILY
Status: DISCONTINUED | OUTPATIENT
Start: 2019-07-12 | End: 2019-07-12 | Stop reason: HOSPADM

## 2019-07-12 RX ADMIN — CLOPIDOGREL BISULFATE 75 MG: 75 TABLET ORAL at 13:56

## 2019-07-12 RX ADMIN — POTASSIUM CHLORIDE 40 MEQ: 1500 TABLET, EXTENDED RELEASE ORAL at 10:39

## 2019-07-12 RX ADMIN — ASPIRIN 81 MG: 81 TABLET, COATED ORAL at 13:57

## 2019-07-12 RX ADMIN — Medication 125 MG: at 10:39

## 2019-07-12 NOTE — PROGRESS NOTES
Progress Note - Critical Care   Mariano Soria 59 y o  female MRN: [id number]  Unit/Bed#: ICU 11 Encounter: [id number]    Assessment/Plan:  1  Stroke-like Symptoms with R sided hemiparesis s/p TPA administration  · Scheduled neuro exams  · Continue stroke pathway   · MRI 7/11 -- No evidence of hemorrhage; focus of restricted diffusion at the : precentral gyrus region consistent with acute/subacute infarct; mild microvascular ischemic disease   · 24H CTH ordered   · HbA1c pending   · PT/OT eval and treat  · Echo pending   · Anticipate initiating ASA after 24H [facility]  · Neurology following  2  HLD  · Lipid panel pending   · Continue home lipitor   3  Lung nodules  · OP follow up in 1 year   _____________________________________________________________________    HPI/24hr events:   She was admitted to the ICU s/p TPA admin for R sided deficits noted while at work with subsequent improvement of symptoms  She continues to have R arm drift and R hand weakness on serial exams  Medications:    Current Facility-Administered Medications:  acetaminophen 650 mg Oral Q6H PRN Cyn Satish Bilofsky, CRNP    ascorbic acid 125 mg Oral Daily Emily K Bilofsky, CRNP    atorvastatin 40 mg Oral QPM Emily K Bilofsky, CRNP    ondansetron 4 mg Intravenous Q6H PRN Cyn Satish Bilofsky, CRNP    sodium chloride 50 mL/hr Intravenous Continuous Alcario Henrique, CRNP Last Rate: 50 mL/hr (07/12/19 0001)         sodium chloride 50 mL/hr Last Rate: 50 mL/hr (07/12/19 0001)         Physical exam:  Vitals: Body mass index is 26 28 kg/m²  Blood pressure 141/72, pulse 76, temperature 97 9 °F (36 6 °C), temperature source Temporal, resp   rate 14, height 5' 1" (1 549 m), weight 63 1 kg (139 lb 1 8 oz), SpO2 93 %, not currently breastfeeding ,  Temp  Min: 97 9 °F (36 6 °C)  Max: 100 °F (37 8 °C)  IBW: 47 8 kg    SpO2: 93 %     O2 Device: None (Room air)      Intake/Output Summary (Last 24 hours) at 7/12/2019 0530  Last data filed at 7/12/2019 3887  Gross per 24 hour   Intake 365 ml   Output 950 ml   Net -585 ml       Invasive/non-invasive ventilation settings:   Respiratory    Lab Data (Last 4 hours)    None         O2/Vent Data (Last 4 hours)    None              Invasive Devices     Peripheral Intravenous Line            Peripheral IV 07/11/19 Left Antecubital less than 1 day                  Physical Exam:  Physical Exam   Constitutional: She is oriented to person, place, and time  She appears well-developed and well-nourished  She is cooperative  She is easily aroused  Non-toxic appearance  She does not have a sickly appearance  She does not appear ill  No distress  HENT:   Head: Normocephalic and atraumatic  Mouth/Throat: No oropharyngeal exudate  Eyes: Pupils are equal, round, and reactive to light  EOM are normal  No scleral icterus  Neck: Normal range of motion  Neck supple  No tracheal deviation present  Cardiovascular: Normal rate, regular rhythm, normal heart sounds and intact distal pulses  Exam reveals no gallop and no friction rub  No murmur heard  Pulses:       Radial pulses are 2+ on the right side, and 2+ on the left side  Dorsalis pedis pulses are 2+ on the right side, and 2+ on the left side  Pulmonary/Chest: Effort normal and breath sounds normal  No accessory muscle usage  No respiratory distress  She exhibits no tenderness  Abdominal: Soft  Bowel sounds are normal  There is no tenderness  Genitourinary:   Genitourinary Comments: Voiding independently   Musculoskeletal: Normal range of motion  She exhibits no edema, tenderness or deformity  Lymphadenopathy:     She has no cervical adenopathy  Neurological: She is alert, oriented to person, place, and time and easily aroused  GCS eye subscore is 4  GCS verbal subscore is 5  GCS motor subscore is 6     AAO x3, in NAD, speech clear and articulate  No facial droop noted  Very mild R pronator drift  Shoulder shrugs L 5/5, R 5/5  Hand grasps L 5/5, R 4/5  UE push/pull L 5/5, R 4/5  Plantar/dorsi flexion L 5/5, R 5/5  Leg lifts L 5/5, R 5/5   Skin: Skin is warm and dry  Capillary refill takes less than 2 seconds  No rash noted  She is not diaphoretic  Psychiatric: She has a normal mood and affect  Her speech is normal and behavior is normal  Judgment and thought content normal    Nursing note and vitals reviewed  Diagnostic Data:  Lab: I have personally reviewed pertinent lab results  CBC:   Results from last 7 days   Lab Units 07/12/19  0448 07/11/19  1213   WBC Thousand/uL 8 17 12 28*   HEMOGLOBIN g/dL 14 5 15 6*   HEMATOCRIT % 42 8 47 5*   PLATELETS Thousands/uL 272 338       CMP:   Results from last 7 days   Lab Units 07/11/19  1213   SODIUM mmol/L 140   POTASSIUM mmol/L 3 9   CHLORIDE mmol/L 105   CO2 mmol/L 26   BUN mg/dL 22   CREATININE mg/dL 0 71   CALCIUM mg/dL 10 1     PT/INR:   Lab Results   Component Value Date    INR 1 01 07/11/2019   ,   Magnesium:     Phosphorous:       Microbiology:  None    Imaging:  CTA 7/11/19 -- no intracranial large vessel occlusion/critical stenosis; few sub-4mm nodules in partially imaged RUL  CXR 7/11/19 -- BL lower lobe haziness without gross infiltrate, no large ptx or effusions    MRI brain wo contrast   Final Result      Focus of restricted diffusion at the left precentral gyrus/hand knob region with associated T2/FLAIR hyperintensity consistent with acute/subacute infarct    No evidence of hemorrhage      Mild microvascular ischemic disease      Findings discussed with Dr Karlie Abebe at 4:27 AM, 7/12/2019            Workstation performed: [id number]         CT head wo contrast    (Results Pending)       Cardiac lab/EKG/telemetry/ECHO:   NSR on tele    VTE Prophylaxis: TPA in last 24H, SCDs    Code Status: Level 3 - DNAR and DNI    LORETTA Willett    Portions of the record may have been created with voice recognition software   Occasional wrong word or "sound a like" substitutions may have occurred due to the inherent limitations of voice recognition software  Read the chart carefully and recognize, using context, where substitutions have occurred

## 2019-07-12 NOTE — ASSESSMENT & PLAN NOTE
Overnight she has had no some adverse reactions to her tPA given yesterday approximately 112 noon  She is now 24 hours approximate post tPA administration  She reports she is doing well overall  She has blood draws scheduled for this afternoon for a thrombosis panel  We have discussed with this patient her MRI in imaging indicate a small residual area scattered throughout the MCA territory of ischemic injury not salvaged by the tPA  Aspirin continually, 81 mg with the addition of 3 weeks only of Plavix on a daily basis  Link placement as an outpatient by Cardiology  Secondary risk factor modification - she reports she previously tolerated Mevacor/through the statin better than any other cholesterol modifying agents and therefore we will keep her on this  I have asked her to initiate home blood pressure monitoring  Avoid hypo/hypertension -  blood pressure goal 160 initially   She would do well with aggressive outpatient OT to assist with returning her dominant right hand to full function

## 2019-07-12 NOTE — OCCUPATIONAL THERAPY NOTE
OccupationalTherapy Evaluation(time=8356-5722)     Patient Name: Leisa Mills  AIXHQ'Y Date: 7/12/2019  Problem List  Patient Active Problem List   Diagnosis    Generalized anxiety disorder    Other hyperlipidemia    Increased BMI    Seasonal allergic rhinitis due to pollen    Lesion of nose    Age-related osteoporosis without current pathological fracture    Acute ischemic left middle cerebral artery (MCA) stroke ([facility] [address] )    Stroke aborted by administration of thrombolytic agent [facility])     Past Medical History  Past Medical History:   Diagnosis Date    Bladder stone     Hyperlipidemia     Ureteral calculus, right      Past Surgical History  Past Surgical History:   Procedure Laterality Date    GALLBLADDER SURGERY      onset: 2011 07/12/19 1240   Note Type   Note type Eval only   Restrictions/Precautions   Weight Bearing Precautions Per Order No   Other Precautions Fall Risk;Multiple lines   Pain Assessment   Pain Assessment No/denies pain   Home Living   Type of [address] [place] [address] Two level; Able to live on main level with bedroom/bathroom  (1 diana)   [address] 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   Intrinsic Gratification shopping   Psychosocial   Psychosocial (WDL) WDL   Subjective   Subjective "Will I get the strength in my hand back?"   ADL   Where Assessed Edge of bed   Eating Assistance 6  Modified independent   Grooming Assistance 6  [address] [place] [address] 5  [address] [place] [address]  5  [address]  5 Supervision/Setup   Toileting Deficit Use of bedpan/urinal setup; Clothing management up;Clothing management down   Bed Mobility   Rolling R 5  Supervision   Rolling L 5  Supervision   Supine to Sit 5  Supervision   Sit to Supine 5  Supervision   Transfers   Sit to Stand 4  Minimal assistance   Additional items Assist x 1; Increased time required;Verbal cues   Stand to Sit 4  Minimal assistance   Additional items Assist x 1; Increased time required;Verbal cues   Functional Mobility   Functional Mobility 4  Minimal assistance   Additional Comments x1   Additional items Rolling walker   Balance   Static Sitting Good   Dynamic Sitting Fair +   Static Standing Fair   Dynamic Standing Fair -   Activity Tolerance   Activity Tolerance Patient limited by fatigue   Medical Staff Made Aware nsg, P T     RUE Assessment   RUE Assessment WFL   RUE Strength   RUE Overall Strength   (shr/elbow=4+/5, hand/wrist=3+/5 )   LUE Assessment   LUE Assessment WFL   LUE Strength   LUE Overall Strength Within Functional Limits - strength 5/5   Hand Function   Gross Motor Coordination Functional   Fine Motor Coordination   (R=impaired, L=int)   Sensation   Light Touch No apparent deficits   Proprioception   Proprioception No apparent deficits   Vision-Basic Assessment   Current Vision Wears glasses all the time   Vision - Complex Assessment   Acuity Able to read clock/calendar on wall without difficulty   Perception   Inattention/Neglect Appears intact   Cognition   Overall Cognitive Status WFL   Arousal/Participation Alert   Attention Within functional limits   Orientation Level Oriented X4   Memory Within functional limits   Following Commands Follows all commands and directions without difficulty   Assessment   Limitation Decreased ADL status; Decreased UE strength;Decreased endurance;Decreased fine motor control   Prognosis Good   Assessment Pt is a 55y/o female admitted to the hospital 2* symptoms of R UE weakness; +TPA, +acute/subacute L precentral gyrus/hand knob region  PTA pt states independence with all aspects of her ADLs, transfers, ambulation--w/o device; neg falls, +, neg home alone  During initial eval, pt demonstrated slight deficits with her functional balance, functional mobility, ADL status, and R UE strength  Pt would benefit from continued OT tx for the above deficits  3-5xwk/1-2wks  Goals   Patient Goals "to get better "   STG Time Frame 3-5   Short Term Goal #1 Pt will demonstrate proper walker/transfer safety 100% of the time  Short Term Goal #2 Pt will demonstrate g/g- balance with all functional activities  Short Term Goal  Pt will demonstrate mod I with their UE and LE bathing/dresssing  LTG Time Frame   (5-10 days)   Long Term Goal #1 Pt will demonstrate independence with her R UE % of the time  Long Term Goal #2 Pt will demonstrate improved R UE strength by 1/2 MM grade to improve overall functional use  Long Term Goal Pt will demonstrate mod I with their sit-stand transfers to assist with completion of their LE dressing  Plan   Treatment Interventions ADL retraining;Functional transfer training;UE strengthening/ROM; Endurance training;Patient/family training; Compensatory technique education   Goal Expiration Date 07/23/19   Treatment Day 0   OT Frequency 3-5x/wk   Recommendation   OT Discharge Recommendation   (outpt OT/PT)   Barthel Index   Feeding 10   Bathing 0   Grooming Score 5   Dressing Score 5   Bladder Score 10   Bowels Score 10   Toilet Use Score 5   Transfers (Bed/Chair) Score 10   Mobility (Level Surface) Score 0   Stairs Score 0   Barthel Index Score 55   Modified Clay Scale   Modified Clay Scale 2   Toshia Franco, OT

## 2019-07-12 NOTE — ASSESSMENT & PLAN NOTE
She reports she is on lovastatin/Mevacor, which she reported worked well because she experienced myalgia with Lipitor

## 2019-07-12 NOTE — PHYSICAL THERAPY NOTE
PT EVALUATION    Pt  Name: Nacho Marcos  Pt  Age: 59 y o  MRN: [id number]  LENGTH OF STAY: 1    Patient Active Problem List   Diagnosis    Generalized anxiety disorder    Other hyperlipidemia    Increased BMI    Seasonal allergic rhinitis due to pollen    Lesion of nose    Age-related osteoporosis without current pathological fracture    Acute ischemic left middle cerebral artery (MCA) stroke ([facility] [address] )    Stroke aborted by administration of thrombolytic agent ([facility] [address] )       Admitting Diagnoses:   Stroke ([facility] [address] ) [I63 9]    Past Medical History:   Diagnosis Date    Bladder stone     Hyperlipidemia     Ureteral calculus, right        Past Surgical History:   Procedure Laterality Date    GALLBLADDER SURGERY      onset: 2011       Imaging Studies:  MRI brain wo contrast   Final Result by Ervin Aqunio MD (07/12 9182)      Focus of restricted diffusion at the left precentral gyrus/hand knob region with associated T2/FLAIR hyperintensity consistent with acute/subacute infarct    No evidence of hemorrhage      Mild microvascular ischemic disease      Findings discussed with Dr Adriana Pantoja at 4:27 AM, 7/12/2019            Workstation performed: [id number]         CT head wo contrast    (Results Pending)        07/12/19 1238   Note Type   Note type Eval only   Pain Assessment   Pain Score No Pain   Home Living   Type of [address] [facility] Two level; Able to live on main level with bedroom/bathroom  ([address])   [address] [facility]; Wheelchair-manual   Prior Function   Level of [place] Independent with ADLs and functional mobility  (w/o AD)   Lives With Spouse   Receives Help From Family   ADL Assistance Independent   Falls in the last 6 months 0   Vocational Full time employment   Comments (+)    Restrictions/Precautions   Weight Bearing Precautions Per Order No   Other Precautions Fall Risk;Multiple lines;Telemetry   General   Family/Caregiver Present Yes   Cognition   Overall Cognitive Status [facility] Arousal/Participation Alert   Orientation Level Oriented X4   Following Commands Follows all commands and directions without difficulty   RUE Assessment   RUE Assessment   (refer to OT)   LUE Assessment   LUE Assessment   (refer to OT)   RLE Assessment   RLE Assessment WFL   Strength RLE   RLE Overall Strength 4+/5   LLE Assessment   LLE Assessment WFL   Strength LLE   LLE Overall Strength 5/5   Coordination   Movements are Fluid and Coordinated 0   Coordination and Movement Description impaired fine motor coordinator L hand   Sensation WFL   Bed Mobility   Supine to Sit 5  Supervision   Additional items HOB elevated; Bedrails; Increased time required;Verbal cues   Sit to Supine 5  Supervision   Additional items Increased time required;Verbal cues   Additional Comments cues for techniques & safety   Transfers   Sit to Stand 4  Minimal assistance   Additional items Assist x 1; Increased time required;Verbal cues   Stand to Sit 4  Minimal assistance   Additional items Assist x 1; Increased time required;Verbal cues   Additional Comments cues for techniques & safety   Ambulation/Elevation   Gait pattern Excessively slow;Decreased foot clearance   Gait Assistance 4  Minimal assist   Additional items Assist x 1;Verbal cues; Tactile cues   Assistive Device None; Other (Comment)  (hand hold assist)   Distance 12'x1 (5' forward, 5' back & 2'x1 lateral steps to [facility])  (limit amb 2* to <24hrs post- tPA)   Balance   Static Sitting Good   Static Standing Fair   Ambulatory Fair -   Activity Tolerance   Activity Tolerance Patient tolerated treatment well   Medical Staff Made Aware OT Waldo   Nurse Made Aware Mendez Nichole   Assessment   Prognosis Good   Problem List Decreased strength;Decreased endurance; Impaired balance;Decreased mobility; Decreased coordination   Assessment Pt  59 y  o female [facility] w/ c/o RUE weakness  MRI showed acute/subacute infarct  Pt admitted for acute Stroke ([facility] [address] ) I63 9   S/p tPA on 7/11/19 at 14:00  Pt referred to PT for mobility assessment & D/C planning w/ orders of bedrest w/ bathroom privileges  PTA, pt reports being I w/o AD; working full time  On eval, pt demonstrate dec mobility, balance, endurance & amb  (+) RUE weakness, distal>proximal  Impaired fine motor coordination R hand  Pt require minAx1 for transfers & amb; S for bed mobility + cues for techniques  Gait deviations as above, slow w/ dec foot clearance but no gross LOB noted  No dizziness reported t/o session  Nsg staff most recent vital signs as follows: /94 (BP Location: Right arm)   Pulse 90   Temp 98 7 °F (37 1 °C) (Temporal)   Resp (!) 24   Ht 5' 1" (1 549 m)   Wt 61 5 kg (135 lb 9 3 oz)   SpO2 98%   BMI 25 62 kg/m²   At end of session, pt back in bed w/o issues, call bell & phone in reach  Fall precautions reinforced w/ good understanding  Pt functioning below baseline hence will continue skilled PT to improve function & safety  At this time, will anticipate good progress in PT for safe D/C to home  Pt will benefit from HHPT or OPPT & family support at D/C, pending pt's preference  CM to follow  Nsg staff to continue to mobilized pt (OOB in chair for all meals & ambulate in room/unit) as tolerated to prevent further decline in function  Nsg notified  Goals   Patient Goals to get better   STG Expiration Date 07/19/19   Short Term Goal #1 Goals to be met in 7 days; pt will be able to: 1) inc strength & balance by 1/2 grade to improve overall functional mobility & dec fall risk; 2) inc bed mobility to I for pt to be able to get in/OOB safely w/ proper techniques 100% of the time, to dec caregiver assistance & safely function at home; 3) inc transfers to S for pt to transition safely from one surface to another w/o % of the time, to dec caregiver assistance & safely function at home; 4) inc amb w/ w/o AD approx   >350' w/ S for pt to ambulate community distances w/o any % of the time, to dec caregiver assistance & safely function at home; 5) inc barthel score to 75 to decrease overall risk for falls; 6) negotiate stairs w/ S for inc safety during stair mgt inside/outside of home & dec caregiver assistance; 7) pt/caregiver ed   Treatment Day 0   Plan   Treatment/Interventions Functional transfer training;LE strengthening/ROM; Elevations; Therapeutic exercise; Endurance training;Patient/family training;Bed mobility;Gait training;Spoke to nursing;OT;Family   PT Frequency Other (Comment)  (3-5x/wk)   Recommendation   Recommendation Home PT;Outpatient PT; Home with family support  (pending on pt's preference)   Barthel Index   Feeding 10   Bathing 0   Grooming Score 5   Dressing Score 5   Bladder Score 10   Bowels Score 10   Toilet Use Score 5   Transfers (Bed/Chair) Score 10   Mobility (Level Surface) Score 0   Stairs Score 0   Barthel Index Score 55   Modified Ge Scale: 2    Hx/personal factors: co-morbidities, mutliple lines, telemetry, fall risk and assist w/ ADL's  Examination: dec mobility, dec balance, dec endurance, dec amb, moderate fall risk  Clinical: unpredictable (ongoing medical status, abnormal lab values and moderate fall risk)  Complexity: high     Lloyd Jordyn, PT

## 2019-07-12 NOTE — PROGRESS NOTES
Neurology Progress Note - Mariano Soria 1960, 59 y o  female   MRN: [id number] Unit/Bed#: ICU 11 Encounter: [id number]          Acute ischemic left middle cerebral artery (MCA) stroke [facility])  Assessment & Plan  The patient reports that she feels as if her right leg is still back to baseline today  She reports that although she feels her right hand is stronger she still has residual weakness in the fingers particularly  She feels that her wrist has improved some overnight  Her MRI done last evening demonstrates some residual diffusion positive images particularly somewhat in the distal or M3 territory as well as in the M2 territory particularly at her motor strip correlating well with her hand weakness  There also was a small punctate region of ischemia noted on the anterior MCA territory as well  Her FLAIR images demonstrate very little small vessel disease  Her echocardiogram is pending to complete her workup  Overall she is doing quite well  I anticipate she will be stable for transfer out to the floor today with potential discharge    * Stroke aborted by administration of thrombolytic agent [facility])  Assessment & Plan  Overnight she has had no some adverse reactions to her tPA given yesterday approximately 112 noon  She is now 24 hours approximate post tPA administration  She reports she is doing well overall  She has blood draws scheduled for this afternoon for a thrombosis panel  We have discussed with this patient her MRI in imaging indicate a small residual area scattered throughout the MCA territory of ischemic injury not salvaged by the tPA  Aspirin continually, 81 mg with the addition of 3 weeks only of Plavix on a daily basis  Link placement as an outpatient by Cardiology  Secondary risk factor modification - she reports she previously tolerated Mevacor/through the statin better than any other cholesterol modifying agents and therefore we will keep her on this    I have asked her to initiate home blood pressure monitoring  Avoid hypo/hypertension -  blood pressure goal 160 initially   She would do well with aggressive outpatient OT to assist with returning her dominant right hand to full function  Other hyperlipidemia  Assessment & Plan  She reports she is on lovastatin/Mevacor, which she reported worked well because she experienced myalgia with Lipitor  Generalized anxiety disorder  Assessment & Plan  We have started her on a small dose of Paxil not only to help with her generalized anxiety disorder and stress which may hinder her stroke recovery but also to help the improvement of therapy in her right hand as she moves forward  Addendum:  I have discussed with the patient that from a neurologic stay standpoint she is stable for discharge later on this afternoon provided her echocardiogram is within normal limits  We have reviewed discharge instructions in terms of her meds and her follow-up care  I have discussed with the critical care that if they feel there are no other medical issue she is stable for discharge neurologically  Subjective/Objective     Subjective: Im great today, my hand is better but not normal    ROS:  This patient reports overall last night she has had improvement in the strength in her wrist as well as somewhat in her  particularly the flexion of her right digits  She reports that she still feels that is significantly weaker she is unable to demonstrate a good extension  She reports that her right lower extremity feels some normal including her leg she has been able to bear weight on it by her report without any weakness  She denies any other complaints  The remainder of her query was negative        Current Facility-Administered Medications:  acetaminophen 650 mg Oral Q6H PRN   ascorbic acid 125 mg Oral Daily   aspirin 81 mg Oral Daily   And      clopidogrel 75 mg Oral Daily   atorvastatin 40 mg Oral QPM   ondansetron 4 mg Intravenous Q6H PRN PARoxetine 10 mg Oral HS   sodium chloride 50 mL/hr Intravenous Continuous       acetaminophen    ondansetron    Vitals: Blood pressure 154/94, pulse 90, temperature 98 7 °F (37 1 °C), temperature source Temporal, resp  rate (!) 24, height 5' 1" (1 549 m), weight 61 5 kg (135 lb 9 3 oz), SpO2 98 %, not currently breastfeeding  ,Body mass index is 25 62 kg/m²  Physical Exam:     Burton Guaman seen in:  In bed, there is no family present  General appearance: alert,   Neck, Lungs, Heart, & abdomen: WNL, no murmur present  Sinus rhythm on the monitor  Extremities: atraumatic, no cyanosis or edema    Neurologic:   Mental status: Alert, oriented x4, thought content appropriate and clear without aphasia or dysarthria  She had several appropriate questions concerning stroke prevention  CN:  Benign nonlateralizing cranial nerve exam EOM's I, Gaze conjugate  Non lateralizing sensory & motor exam, (PP not tested on face)  Reminder CNVIII-XII normal    Motor: full power age appropriate x 4 limbs, with the exception of her right wrist is improved compared with yesterday she is now 4/5  Her digits on the right she is doing much better with flexion including the thumb and the 5th finger  She has 4/5 with those  Her extension is still markedly weak 3-/5  Sensory: grossly intact  X 4 limbs, PP not tested  Cerebellar: no ataxia or past pointing w pronation from a high [place] is Romberg position  Finger taps on the right are impaired  I have discussed with her that these exercises will help with the return of the fine motor movement  Gait:  Staff nurse reports that she was smooth to and from the bathroom with no loss of balance episode  DTR's: Age appropriate,  Plantars: downgoing left mute on the right  Lab Results:   I have personally reviewed pertinent reports    , CBC:   Results from last 7 days   Lab Units 07/12/19  0448 07/11/19  1213   WBC Thousand/uL 8 17 12 28*   RBC Million/uL 4 59 5 04   HEMOGLOBIN g/dL 14 5 15 6*   HEMATOCRIT % 42 8 47 5*   MCV fL 93 94   PLATELETS Thousands/uL 272 338   , BMP/CMP:   Results from last 7 days   Lab Units 07/12/19  0448 07/11/19  1213   SODIUM mmol/L 140 140   POTASSIUM mmol/L 3 5 3 9   CHLORIDE mmol/L 106 105   CO2 mmol/L 24 26   BUN mg/dL 14 22   CREATININE mg/dL 0 54* 0 71   CALCIUM mg/dL 9 7 10 1   EGFR ml/min/1 73sq m 103 93   , Vitamin B12:   , HgBA1C:   Results from last 7 days   Lab Units 07/12/19  0448   HEMOGLOBIN A1C % 5 6   , TSH:   , Coagulation:   Results from last 7 days   Lab Units 07/11/19  1213   INR  1 01   , Lipid Profile:   Results from last 7 days   Lab Units 07/12/19  0448   HDL mg/dL 72*   LDL CALC mg/dL 115*   TRIGLYCERIDES mg/dL 60    I discussed with her her elevated LDL her experience with previous statins  She was stay on the Mevacor at this point  Imaging Studies: I have personally reviewed pertinent films in PACS and Have reviewed with the patient the bedside her MRI films in particular with the demonstration of her infarct particularly in the posterior and mid MCA territory  There was a very faint punctate region also noted in the anterior MCA territory that appear to be ischemic despite the tPA     EEG, Echo, Pathology, and Other Studies: Her echocardiogram is pending

## 2019-07-12 NOTE — UTILIZATION REVIEW
Initial Clinical Review    Admission: Date/Time/Statement: 7/11/19 @ 1553 INPATIENT  Presented at [facility] ED on 7/11 as a stroke alert  Given tPA in the ED  Transferred by EMS to [facility] ICU  Orders Placed This Encounter   Procedures    Inpatient Admission     Standing Status:   Standing     Number of Occurrences:   1     Order Specific Question:   Admitting Physician     Answer:   Anibal Angel     Order Specific Question:   Level of Care     Answer:   Critical Care [15]     Order Specific Question:   Estimated length of stay     Answer:   More than 2 Midnights     Order Specific Question:   Certification     Answer:   I certify that inpatient services are medically necessary for this patient for a duration of greater than two midnights  See H&P and MD Progress Notes for additional information about the patient's course of treatment  HPI:  Jose Espinoza is a 59 y o  female with PMH significant for hyperlipidemia who was transferred  to [facility] ICU from [email or web address] s/p tPA  Patient was at work today when she noticed that she could not  a can at approximately 11am, she states that she kept dropping it  She rested for a short period and noted that it became worse with inability to lift right arm and weakness in legs at 1115 am and due to this EMS was activated  She denies any previous stroke, falls or trauma  Family history of stroke with mom in her 46s  NIH Stroke Scale 5  Patient received tPA, completed at 1400 at [email or web address] with improvement in symptoms  She is now able to lift right arm but has a drift and weakness of right hand  Plan: Inpatient admission to critical care for stroke like symptoms with right sided hemiparesis S/P tPa  Stroke pathway, frequent neuro checks, monitor CBC and clotting profile, ECHO, CT in 24 hours to assess for bleeding, MRI, HgbA1c, PT/OT, bedside swallowing eval, monitor lipid panel, continue Lipitor      7/11 Neurology Consult: Stroke aborted by administration of thrombolytic agent [facility])    From the description provided by the patient and her  as well as the documentation from [address] it appears as if this patient was experiencing significant right arm followed by right leg weakness  Apparently she never had right facial weakness  She was given tPA and her stroke was aborted somewhat and by her report and exam at this time she has had good return of function in the right leg she has generally good return of function in the right arm with the exception of a deficits still in terms of weakness at the right wrist and the hand  She continues to have no bulbar involvement  She has tolerated the tPA administration well to this point  Overnight she will be monitored and treatment will include the following: Repeat CT of head 24 Hrs post TPA  24 hours post tpa ok for ASA if no bleed  Secondary risk factor modification - started on Lipitor and blood pressure control when cleared by speech and taking PO  Avoid hypo/hypertension -  blood pressure goal 160 initially, BS control, MRI of brain pending, Echo pending, Aspiration precautions, DVT prevention, Neurological checks; notify neurology or critical care team with any changes, Aggressive therapies - depending on the improvement in her right hand given that it is her dominant hand she may benefit from PMR consultation  She reports she is on lovastatin now for some time she is compliant  We will change her to Lipitor 40 mg most likely  7/12 Critical Care physical exam: AAO x3, in NAD, speech clear and articulate, No facial droop noted, Very mild R pronator drift, Shoulder shrugs L 5/5, R 5/5, Hand grasps L 5/5, R 4/5,  UE push/pull L 5/5, R 4/5, Plantar/dorsi flexion L 5/5, R 5/5, Leg lifts L 5/5, R 5/5  Able to move her arm better today  Continue Stroke pathway, scheduled neuro exams    MRI no evidence of hemorrhage; focus of restricted diffusion at the precentral gyrus region consistent with acute/subacute infarct; mild microvascular ischemic disease, 24H CT head ordered, ECHO pending, Neurology following          ED Triage Vitals [07/11/19 1600]   Temperature Pulse Respirations Blood Pressure SpO2   98 5 °F (36 9 °C) (!) 108 (!) 31 157/76 94 %   Pain Score       2        Wt Readings from Last 1 Encounters:   07/12/19 61 5 kg (135 lb 9 3 oz)     Additional Vital Signs: All SpO2 on room air       Date/Time  Temp  Pulse  Resp  BP  MAP (mmHg)  SpO2    07/12/19 1100    90  24  154/94  133  98 %    07/12/19 1000    92  22  152/71  96  95 %    07/12/19 0900    92  22  156/86  108  95 %    07/12/19 0800    92  23  150/89  107  96 %    07/12/19 0700  98   72  14  134/74  100  93 %    07/12/19 0600    68  12  137/80  103  93 %    07/12/19 0500    76  20  141/84  106  96 %    07/12/19 0400    68  13  120/68  86  94 %    07/12/19 0300    76  14  141/72  100  93 %    07/12/19 0200    76  12  142/78  105  96 %    07/11/19 2030    80  14  140/66  95  91 %    07/11/19 2000    86  20  151/88  109  90 %    07/11/19 1930    90  14  159/80  104  94 %    07/11/19 1929  97 9               07/11/19 1900    92  15  151/82  113  92 %    07/11/19 1700    102  30  199/11  159  96 %    07/11/19 1630    94  15  146/80  110  94 %    07/11/19 1615    102  17  142/74  103  94 %    07/11/19 1600  98 5   108   31  157/76  111  94 %        Date and Time Eye Opening Best Verbal Response Best Motor Response [place] Coma Scale Score   07/12/19 1000 4 5 6 15   07/12/19 0900 4 5 6 15   07/12/19 0800 4 5 6 15   07/12/19 0700 4 5 6 15   07/12/19 0600 4 5 6 15   07/12/19 0400 4 5 6 15   07/12/19 0200 4 5 6 15   07/12/19 0100 4 5 6 15   07/12/19 0000 4 5 6 15   07/11/19 2200 4 5 6 15   07/11/19 2000 4 5 6 15   07/11/19 1830 4 5 6 15   07/11/19 1800 4 5 6 15   07/11/19 1730 4 5 6 15   07/11/19 1700 4 5 6 15   07/11/19 1630 4 5 6 15   07/11/19 1615 4 5 6 15   07/11/19 1600 4 5 6 15   07/11/19 1545 4 5 6 15   07/11/19 1410 4 5 6 15   07/11/19 1355 4 5 6 15   07/11/19 1340 4 5 6 15   07/11/19 1325 4 5 6 15   07/11/19 1315 4 5 6 15   07/11/19 1300 4 5 6 15   07/11/19 1245 4 5 6 15   07/11/19 1230 4 5 6 15   07/11/19 1215 4 5 6 15       Pertinent Labs/Diagnostic Test Results:   Lab Units 07/12/19  0448 07/11/19  1213   WBC Thousand/uL 8 17 12 28*   HEMOGLOBIN g/dL 14 5 15 6*   HEMATOCRIT % 42 8 47 5*   PLATELETS Thousands/uL 272 338   NEUTROS ABS Thousands/µL 4 69  --        Lab Units 07/12/19  0448 07/11/19  1213   SODIUM mmol/L 140 140   POTASSIUM mmol/L 3 5 3 9   CHLORIDE mmol/L 106 105   CO2 mmol/L 24 26   ANION GAP mmol/L 10 9   BUN mg/dL 14 22   CREATININE mg/dL 0 54* 0 71   EGFR ml/min/1 73sq m 103 93   CALCIUM mg/dL 9 7 10 1           Lab Units 07/12/19  0448 07/11/19  1213   GLUCOSE RANDOM mg/dL 90 101         Lab Units 07/12/19  0448   HEMOGLOBIN A1C % 5 6   EAG mg/dl 114           Lab Units 07/11/19  1213   PROTIME seconds 13 3   INR  1 01   PTT seconds 29         Lab Units 07/11/19  1650   CLARITY UA  Clear   COLOR UA  Yellow   SPEC GRAV UA  <=1 005   PH UA  6 5   GLUCOSE UA mg/dl Negative   KETONES UA mg/dl 15 (1+)*   BLOOD UA  Trace-Intact*   PROTEIN UA mg/dl Negative   NITRITE UA  Negative   BILIRUBIN UA  Negative   UROBILINOGEN UA E U /dl 0 2   LEUKOCYTES UA  Negative   WBC UA /hpf None Seen   RBC UA /hpf 1-2*   BACTERIA UA /hpf Occasional   EPITHELIAL CELLS WET PREP /hpf Occasional         7/12 repeat CT head: 1  Small area of evolving cortical hypodensity in the left precentral gyrus correlating to the recent diffusion abnormality indicative of recent/acute infarction  2   No acute intracranial hemorrhage  3   Mild, chronic microangiopathy elsewhere  4   No new large territorial infarction  6/75 ECHO: Systolic function was normal  Ejection fraction was estimated to be 60 %  There were no regional wall motion abnormalities  Wall thickness was at the upper limits of normal MITRAL VALVE:There was trace regurgitation  TRICUSPID VALVE: There was mild regurgitation  7/11 MRI brain: Focus of restricted diffusion at the left precentral gyrus/hand knob region with associated T2/FLAIR hyperintensity consistent with acute/subacute infarct    No evidence of hemorrhage  Mild microvascular ischemic disease  7/11 CTA head and neck: No intracranial large vessel occlusion/critical stenosis  7/11 CTA brain stroke alert:  No acute intracranial CT abnormality    7/11 Chest x-ray: no acute cardiopulmonary disease  Past Medical History:   Diagnosis Date    Bladder stone     Hyperlipidemia     Ureteral calculus, right      Present on Admission:   Generalized anxiety disorder   Other hyperlipidemia   Acute ischemic left middle cerebral artery (MCA) stroke (HCC)      Admitting Diagnosis: Stroke ([facility] [address] ) [I63 9]  Age/Sex: 59 y o  Female    Admission Orders: Cardio-Pulmonary monitoring, Neurology consult, baseline NIH stroke scale on admission, reassess NIH stroke scale Q24H x 2 days, neuro checks, Nursing dysphagia assessment, SCD, PT/OT eval, MRI, CT head  Current Facility-Administered Medications:  acetaminophen 650 mg Oral Q6H PRN   ascorbic acid 125 mg Oral Daily   aspirin 81 mg Oral Daily   And      clopidogrel 75 mg Oral Daily   atorvastatin 40 mg Oral QPM   ondansetron 4 mg Intravenous Q6H PRN   PARoxetine 10 mg Oral HS     Continuous infusion:     sodium chloride 0 9 % infusion   Rate: 50 mL/hr Dose: 50 mL/hr  Freq: Continuous Route: IV          Network Utilization Review Department  Phone: [phone]; Fax [phone]  [email or web address]  ATTENTION: Please call with any questions or concerns to [phone]  and carefully listen to the prompts so that you are directed to the right person  Send all requests for admission clinical reviews, approved or denied determinations and any other requests to fax [phone]   All voicemails are confidential

## 2019-07-12 NOTE — ASSESSMENT & PLAN NOTE
We have started her on a small dose of Paxil not only to help with her generalized anxiety disorder and stress which may hinder her stroke recovery but also to help the improvement of therapy in her right hand as she moves forward

## 2019-07-12 NOTE — SOCIAL WORK
Patient opted for Home OT - however, per all agencies referrals sent to, no ability to provide home OT only  [facility] provided the patient with a script for OPPT, she will f/u at [facility]

## 2019-07-12 NOTE — PLAN OF CARE
Problem: OCCUPATIONAL THERAPY ADULT  Goal: Performs self-care activities at highest level of function for planned discharge setting  See evaluation for individualized goals  Description  Treatment Interventions: ADL retraining, Functional transfer training, UE strengthening/ROM, Endurance training, Patient/family training, Compensatory technique education          See flowsheet documentation for full assessment, interventions and recommendations  Note:   Limitation: Decreased ADL status, Decreased UE strength, Decreased endurance, Decreased fine motor control  Prognosis: Good  Assessment: Pt is a 55y/o female admitted to the hospital 2* symptoms of R UE weakness; +TPA, +acute/subacute L precentral gyrus/hand knob region  PTA pt states independence with all aspects of her ADLs, transfers, ambulation--w/o device; neg falls, +, neg home alone  During initial eval, pt demonstrated slight deficits with her functional balance, functional mobility, ADL status, and R UE strength  Pt would benefit from continued OT tx for the above deficits  3-5xwk/1-2wks        OT Discharge Recommendation: (outpt OT/PT)

## 2019-07-12 NOTE — DISCHARGE INSTR - AVS FIRST PAGE
Please call cardiology if they do not call you in a day or to for an outpatient Ritu Emery appointment    Your to stay on aspirin all the time now 1 daily 81 mg  Plavix will only be for the next 21 days that you are to stop  Continue on your Mevacor at the same 40 mg dose you have been on  Please start watching and monitoring and recording year blood pressures at home  He will have 3 follow-up appointments that you should make with her family doctor, Cardiology, and Neurology

## 2019-07-12 NOTE — PLAN OF CARE
Problem: Prexisting or High Potential for Compromised Skin Integrity  Goal: Skin integrity is maintained or improved  Description  INTERVENTIONS:  - Identify patients at risk for skin breakdown  - Assess and monitor skin integrity  - Assess and monitor nutrition and hydration status  - Monitor labs (i e  albumin)  - Assess for incontinence   - Turn and reposition patient  - Assist with mobility/ambulation  - Relieve pressure over bony prominences  - Avoid friction and shearing  - Provide appropriate hygiene as needed including keeping skin clean and dry  - Evaluate need for skin moisturizer/barrier cream  - Collaborate with interdisciplinary team (i e  Nutrition, Rehabilitation, etc )   - Patient/family teaching  Outcome: Progressing     Problem: Neurological Deficit  Goal: Neurological status is stable or improving  Description  Interventions:  - Monitor and assess patient's level of consciousness, motor function, sensory function, and level of assistance needed for ADLs  - Monitor and report changes from baseline  Collaborate with interdisciplinary team to initiate plan and implement interventions as ordered  - Provide and maintain a safe environment  - Utilize seizure precautions  - Utilize fall precautions  - Utilize aspiration precautions  - Utilize bleeding precautions  Outcome: Progressing     Problem: Activity Intolerance/Impaired Mobility  Goal: Mobility/activity is maintained at optimum level for patient  Description  Interventions:  - Assess and monitor patient  barriers to mobility and need for assistive/adaptive devices  - Assess patient's emotional response to limitations  - Collaborate with interdisciplinary team and initiate plans and interventions as ordered  - Encourage independent activity per ability   - Maintain proper body alignment  - Perform active/passive rom as tolerated/ordered    - Plan activities to conserve energy   - Turn patient  Outcome: Progressing     Problem: PAIN - ADULT  Goal: Verbalizes/displays adequate comfort level or baseline comfort level  Description  Interventions:  - Encourage patient to monitor pain and request assistance  - Assess pain using appropriate pain scale  - Administer analgesics based on type and severity of pain and evaluate response  - Implement non-pharmacological measures as appropriate and evaluate response  - Consider cultural and social influences on pain and pain management  - Notify physician/advanced practitioner if interventions unsuccessful or patient reports new pain  Outcome: Progressing     Problem: INFECTION - ADULT  Goal: Absence or prevention of progression during hospitalization  Description  INTERVENTIONS:  - Assess and monitor for signs and symptoms of infection  - Monitor lab/diagnostic results  - Monitor all insertion sites, i e  indwelling lines, tubes, and drains  - Monitor endotracheal (as able) and nasal secretions for changes in amount and color  - [place] appropriate cooling/warming therapies per order  - Administer medications as ordered  - Instruct and encourage patient and family to use good hand hygiene technique  - Identify and instruct in appropriate isolation precautions for identified infection/condition  Outcome: Progressing  Goal: Absence of fever/infection during neutropenic period  Description  INTERVENTIONS:  - Monitor WBC  - Implement neutropenic guidelines  Outcome: Progressing     Problem: SAFETY ADULT  Goal: Patient will remain free of falls  Description  INTERVENTIONS:  - Assess patient frequently for physical needs  -  Identify cognitive and physical deficits and behaviors that affect risk of falls    -  [place] fall precautions as indicated by assessment   - Educate patient/family on patient safety including physical limitations  - Instruct patient to call for assistance with activity based on assessment  - Modify environment to reduce risk of injury  - Consider OT/PT consult to assist with strengthening/mobility  Outcome: Progressing  Goal: Maintain or return to baseline ADL function  Description  INTERVENTIONS:  -  Assess patient's ability to carry out ADLs; assess patient's baseline for ADL function and identify physical deficits which impact ability to perform ADLs (bathing, care of mouth/teeth, toileting, grooming, dressing, etc )  - Assess/evaluate cause of self-care deficits   - Assess range of motion  - Assess patient's mobility; develop plan if impaired  - Assess patient's need for assistive devices and provide as appropriate  - Encourage maximum independence but intervene and supervise when necessary  ¯ Involve family in performance of ADLs  ¯ Assess for home care needs following discharge   ¯ Request OT consult to assist with ADL evaluation and planning for discharge  ¯ Provide patient education as appropriate  Outcome: Progressing  Goal: Maintain or return mobility status to optimal level  Description  INTERVENTIONS:  - Assess patient's baseline mobility status (ambulation, transfers, stairs, etc )    - Identify cognitive and physical deficits and behaviors that affect mobility  - Identify mobility aids required to assist with transfers and/or ambulation (gait belt, sit-to-stand, lift, walker, cane, etc )  - [place] fall precautions as indicated by assessment  - Record patient progress and toleration of activity level on Mobility SBAR; progress patient to next Phase/Stage  - Instruct patient to call for assistance with activity based on assessment  - Request Rehabilitation consult to assist with strengthening/weightbearing, etc   Outcome: Progressing     Problem: DISCHARGE PLANNING  Goal: Discharge to home or other facility with appropriate resources  Description  INTERVENTIONS:  - Identify barriers to discharge w/patient and caregiver  - Arrange for needed discharge resources and transportation as appropriate  - Identify discharge learning needs (meds, wound care, etc )  - Arrange for interpretive services to assist at discharge as needed  - Refer to Case Management Department for coordinating discharge planning if the patient needs post-hospital services based on physician/advanced practitioner order or complex needs related to functional status, cognitive ability, or social support system  Outcome: Progressing     Problem: Knowledge Deficit  Goal: Patient/family/caregiver demonstrates understanding of disease process, treatment plan, medications, and discharge instructions  Description  Complete learning assessment and assess knowledge base  Interventions:  - Provide teaching at level of understanding  - Provide teaching via preferred learning methods  Outcome: Progressing     Problem: NEUROSENSORY - ADULT  Goal: Achieves stable or improved neurological status  Description  INTERVENTIONS  - Monitor and report changes in neurological status  - Initiate measures to prevent increased intracranial pressure  - Maintain blood pressure and fluid volume within ordered parameters to optimize cerebral perfusion  - Monitor temperature, glucose, and sodium or any other associated labs  Initiate appropriate interventions as ordered  - Monitor for seizure activity   - Administer anti-seizure medications as ordered  Outcome: Progressing     Problem: CARDIOVASCULAR - ADULT  Goal: Maintains optimal cardiac output and hemodynamic stability  Description  INTERVENTIONS:  - Monitor I/O, vital signs and rhythm  - Monitor for S/S and trends of decreased cardiac output i e  bleeding, hypotension  - Administer and titrate ordered vasoactive medications to optimize hemodynamic stability  - Assess quality of pulses, skin color and temperature  - Assess for signs of decreased coronary artery perfusion - ex   Angina  - Instruct patient to report change in severity of symptoms  Outcome: Progressing  Goal: Absence of cardiac dysrhythmias or at baseline rhythm  Description  INTERVENTIONS:  - Continuous cardiac monitoring, monitor vital signs, obtain 12 lead EKG if indicated  - Administer antiarrhythmic and heart rate control medications as ordered  - Monitor electrolytes and administer replacement therapy as ordered  Outcome: Progressing     Problem: SKIN/TISSUE INTEGRITY - ADULT  Goal: Skin integrity remains intact  Description  INTERVENTIONS  - Identify patients at risk for skin breakdown  - Assess and monitor skin integrity  - Assess and monitor nutrition and hydration status  - Monitor labs (i e  albumin)  - Assess for incontinence   - Turn and reposition patient  - Assist with mobility/ambulation  - Relieve pressure over bony prominences  - Avoid friction and shearing  - Provide appropriate hygiene as needed including keeping skin clean and dry  - Evaluate need for skin moisturizer/barrier cream  - Collaborate with interdisciplinary team (i e  Nutrition, Rehabilitation, etc )   - Patient/family teaching  Outcome: Progressing  Goal: Incision(s), wounds(s) or drain site(s) healing without S/S of infection  Description  INTERVENTIONS  - Assess and document risk factors for skin impairment   - Assess and document dressing, incision, wound bed, drain sites and surrounding tissue  - Initiate Nutrition services consult and/or wound management as needed  Outcome: Progressing  Goal: Oral mucous membranes remain intact  Description  INTERVENTIONS  - Assess oral mucosa and hygiene practices  - Implement preventative oral hygiene regimen  - Implement oral medicated treatments as ordered  - Initiate Nutrition services referral as needed  Outcome: Progressing     Problem: MUSCULOSKELETAL - ADULT  Goal: Maintain or return mobility to safest level of function  Description  INTERVENTIONS:  - Assess patient's ability to carry out ADLs; assess patient's baseline for ADL function and identify physical deficits which impact ability to perform ADLs (bathing, care of mouth/teeth, toileting, grooming, dressing, etc )  - Assess/evaluate cause of self-care deficits   - Assess range of motion  - Assess patient's mobility; develop plan if impaired  - Assess patient's need for assistive devices and provide as appropriate  - Encourage maximum independence but intervene and supervise when necessary  - Involve family in performance of ADLs  - Assess for home care needs following discharge   - Request OT consult to assist with ADL evaluation and planning for discharge  - Provide patient education as appropriate  Outcome: Progressing  Goal: Maintain proper alignment of affected body part  Description  INTERVENTIONS:  - Support, maintain and protect limb and body alignment  - Provide pt/fam with appropriate education  Outcome: Progressing     Problem: Potential for Falls  Goal: Patient will remain free of falls  Description  INTERVENTIONS:  - Assess patient frequently for physical needs  -  Identify cognitive and physical deficits and behaviors that affect risk of falls    -  [place] fall precautions as indicated by assessment   - Educate patient/family on patient safety including physical limitations  - Instruct patient to call for assistance with activity based on assessment  - Modify environment to reduce risk of injury  - Consider OT/PT consult to assist with strengthening/mobility  Outcome: Progressing

## 2019-07-12 NOTE — PLAN OF CARE
Problem: PHYSICAL THERAPY ADULT  Goal: Performs mobility at highest level of function for planned discharge setting  See evaluation for individualized goals  Description  Treatment/Interventions: Functional transfer training, LE strengthening/ROM, Elevations, Therapeutic exercise, Endurance training, Patient/family training, Bed mobility, Gait training, Spoke to nursing, OT, Family          See flowsheet documentation for full assessment, interventions and recommendations  Note:   Prognosis: Good  Problem List: Decreased strength, Decreased endurance, Impaired balance, Decreased mobility, Decreased coordination  Assessment: Pt  58 y  o female [facility] w/ c/o RUE weakness  MRI showed acute/subacute infarct  Pt admitted for acute Stroke ([facility] [address] ) I63 9  S/p tPA on 7/11/19 at 14:00  Pt referred to PT for mobility assessment & D/C planning w/ orders of bedrest w/ bathroom privileges  PTA, pt reports being I w/o AD; working full time  On eval, pt demonstrate dec mobility, balance, endurance & amb  (+) RUE weakness, distal>proximal  Impaired fine motor coordination R hand  Pt require minAx1 for transfers & amb; S for bed mobility + cues for techniques  Gait deviations as above, slow w/ dec foot clearance but no gross LOB noted  No dizziness reported t/o session  Nsg staff most recent vital signs as follows: /94 (BP Location: Right arm)   Pulse 90   Temp 98 7 °F (37 1 °C) (Temporal)   Resp (!) 24   Ht 5' 1" (1 549 m)   Wt 61 5 kg (135 lb 9 3 oz)   SpO2 98%   BMI 25 62 kg/m²   At end of session, pt back in bed w/o issues, call bell & phone in reach  Fall precautions reinforced w/ good understanding  Pt functioning below baseline hence will continue skilled PT to improve function & safety  At this time, will anticipate good progress in PT for safe D/C to home  Pt will benefit from HHPT or OPPT & family support at D/C, pending pt's preference  CM to follow   Nsg staff to continue to mobilized pt (OOB in chair for all meals & ambulate in room/unit) as tolerated to prevent further decline in function  Nsg notified  Recommendation: Home PT, Outpatient PT, Home with family support(pending on pt's preference)          See flowsheet documentation for full assessment

## 2019-07-12 NOTE — DISCHARGE SUMMARY
Discharge Summary - Leisa Mills 59 y o  female MRN: [id number]    Unit/Bed#: ICU 11 Encounter: [id number]    Admission Date:   Admission Orders (From admission, onward)    Ordered        07/11/19 1553  Inpatient Admission  Once               Admitting Diagnosis: Stroke [facility]) [I63 9]    HPI:  59year old female with a past medical history significant for hyperlipidemia presented to [facility] due to right arm weakness concerning for stroke  She was at work and unable to hold a can at approximately 11 am and by 1115 am was unable to lift up her right leg  She also noted that she had difficulty ambulating due to right leg weakness  Due to this presented to the ER as a stroke alert  She received tPA and finished at approximately 1400  Initial head CT negative  Due to stroke was transferred to [facility]  Procedures Performed: No orders of the defined types were placed in this encounter  Summary of Hospital Course:  Patient was transferred to [facility]  On admission had movement of right arm and right leg with 4/5 strength  Her right hand had a droop with a pronator drift but this continued to improve  MRI revealed a focus of restricted diffusion at the left precentral gyrus/hand knob region with associated T2/FLAIR hyperintensity consistent with acute/subacute infarct  Neurology evaluated patient and she will continue on Plavix for 3 weeks in addition to ASA  Will need to follow up with Cardiology, Neurology and PCP as an outpatient  Repeat head CT in 24 hours revealed no hemorrhage and echocardiogram with ef 60%  Monitor blood pressure at home  Significant Findings, Care, Treatment and Services Provided:   7/11 CT brain: no acute abnormality   7/11 MRI brain: Focus of restricted diffusion at the left precentral gyrus/hand knob region with associated T2/FLAIR hyperintensity consistent with acute/subacute infarct    No evidence of hemorrhage  Mild microvascular ischemic disease  7/12 CT head:   1    Small area of evolving cortical hypodensity in the left precentral gyrus correlating to the recent diffusion abnormality indicative of recent/acute infarction  2   No acute intracranial hemorrhage  3   Mild, chronic microangiopathy elsewhere  4   No new large territorial infarction  Complications:      Discharge Diagnosis: Stroke    Resolved Problems  Date Reviewed: 7/11/2019    None          Condition at Discharge: stable         Discharge instructions/Information to patient and family:   See after visit summary for information provided to patient and family  Provisions for Follow-Up Care:  See after visit summary for information related to follow-up care and any pertinent home health orders  PCP: Mitul Eldridge DO    Disposition: Home    Planned Readmission: No    Discharge Statement   I spent 24 minutes discharging the patient  This time was spent on the day of discharge  I had direct contact with the patient on the day of discharge  Additional documentation is required if more than 30 minutes were spent on discharge  Discharge Medications:  See after visit summary for reconciled discharge medications provided to patient and family

## 2019-07-13 LAB
CARDIOLIPIN IGA SER IA-ACNC: <9 APL U/ML (ref 0–11)
CARDIOLIPIN IGG SER IA-ACNC: <9 GPL U/ML (ref 0–14)
CARDIOLIPIN IGM SER IA-ACNC: <9 MPL U/ML (ref 0–12)
DEPRECATED AT III PPP: 112 % OF NORMAL (ref 92–136)

## 2019-07-14 LAB
APTT SCREEN TO CONFIRM RATIO: 0.95 RATIO (ref 0–1.4)
CONFIRM APTT/NORMAL: 30 SEC (ref 0–55)
LA PPP-IMP: NORMAL
SCREEN APTT: 41.3 SEC (ref 0–51.9)
SCREEN DRVVT: 38.9 SEC (ref 0–47)
THROMBIN TIME: 19 SEC (ref 0–23)

## 2019-07-15 ENCOUNTER — TRANSITIONAL CARE MANAGEMENT (OUTPATIENT)
Dept: FAMILY MEDICINE CLINIC | Facility: CLINIC | Age: 59
End: 2019-07-15

## 2019-07-15 LAB
1,25(OH)2D3 SERPL-MCNC: 78 PG/ML (ref 19.9–79.3)
B2 GLYCOPROT1 IGA SER-ACNC: <9 GPI IGA UNITS (ref 0–25)
B2 GLYCOPROT1 IGG SER-ACNC: <9 GPI IGG UNITS (ref 0–20)
B2 GLYCOPROT1 IGM SER-ACNC: <9 GPI IGM UNITS (ref 0–32)
PROT C AG ACT/NOR PPP IA: 130 % OF NORMAL (ref 60–150)
PROT S ACT/NOR PPP: 102 % (ref 63–140)
PROT S ACT/NOR PPP: 108 % (ref 57–157)
PROT S PPP-ACNC: 98 % (ref 60–150)

## 2019-07-16 ENCOUNTER — TRANSITIONAL CARE MANAGEMENT (OUTPATIENT)
Dept: FAMILY MEDICINE CLINIC | Facility: CLINIC | Age: 59
End: 2019-07-16

## 2019-07-16 ENCOUNTER — OFFICE VISIT (OUTPATIENT)
Dept: FAMILY MEDICINE CLINIC | Facility: CLINIC | Age: 59
End: 2019-07-16
Payer: COMMERCIAL

## 2019-07-16 VITALS
DIASTOLIC BLOOD PRESSURE: 76 MMHG | OXYGEN SATURATION: 97 % | WEIGHT: 146 LBS | HEIGHT: 61 IN | HEART RATE: 73 BPM | BODY MASS INDEX: 27.56 KG/M2 | SYSTOLIC BLOOD PRESSURE: 128 MMHG

## 2019-07-16 DIAGNOSIS — I63.512 ACUTE ISCHEMIC LEFT MIDDLE CEREBRAL ARTERY (MCA) STROKE (HCC): Primary | ICD-10-CM

## 2019-07-16 PROCEDURE — 99496 TRANSJ CARE MGMT HIGH F2F 7D: CPT | Performed by: PHYSICIAN ASSISTANT

## 2019-07-16 PROCEDURE — 1111F DSCHRG MED/CURRENT MED MERGE: CPT | Performed by: PHYSICIAN ASSISTANT

## 2019-07-16 NOTE — PROGRESS NOTES
Transition of Care  Follow-up After Hospitalization    Chino Gutierrez 59 y o  female   Date:  7/16/2019    TCM Call (since 6/15/2019)     Date and time call was made  7/15/2019  9:24 AM    Patient was hospitialized at  [address]; [address]    Date of Admission  07/11/19    Date of discharge  07/12/19    Diagnosis  Stroke aborted by administration of thrombolytic agent    Disposition  Home    Were the patients medications reviewed and updated  No    Current Symptoms  None      TCM Call (since 6/15/2019)     Post hospital issues  None    Should patient be enrolled in anticoag monitoring? No    Scheduled for follow up? Yes TCM APPT 7/16/19    Patients specialists  Cardiologist; Neurologist    Cardiologist name  DR Dc Sneed    Neurologist name  DR RADHA RODRIGUEZ [facility]    Did you obtain your prescribed medications  Yes    Do you need help managing your prescriptions or medications  No    Is transportation to your appointment needed  No    I have advised the patient to call PCP with any new or worsening symptoms  [address] [facility] or [facility] other    Support System  Spouse    The type of support provided  Physical; Emotional    Do you have social support  Yes, as much as I need    Are you recieving any outpatient services  Yes    What type of services  [place]    Are you recieving home care services  No    Are you using any community resources  No    Current waiver services  No    Have you fallen in the last 12 months  No    Interperter language line needed  No    Counseling  Patient        Hospital records were reviewed  Medications upon discharge reviewed/updated  Follow up visits with other specialists:   Neurology- 8/22/19  Cardiology- 7/23/19  PT/OT      Assessment and Plan:    Yahaira Weaver was seen today for transition of care management      Diagnoses and all orders for this visit:    Acute ischemic left middle cerebral artery (MCA) stroke ([facility] [address] )  -     Ambulatory referral to Physical Therapy; Future      Referral placed to physical therapy  She has script for occupational therapy for fine motor skills  She will follow-up with cardiology and neurology as scheduled  HPI:  Denisha August is a very pleasant 59year old female who is here today for a ASHANTI  She presented to the ER on 7/11/19 with right sided leg and right arm weakness  She was unable to hold a can at work  She went to the ER via ambulance  She was admitted with the diagnosis of an acute CVA  She received tPA, after initial head CT was negative  She was transferred to [facility]  She has slowly been gaining movement back in her right arm and right leg  She admits to some weakness, but has been getting better each day  She was discharged on aspirin and plavix  She has scheduled follow-ups with cardiology and pulmonology  She plans to start OT today, and is also interested in PT for her right lower leg weakness  She denies any facial droop, dysarthria, headaches, increased weakness, chest pains, palpitations, or shortness of breath  She admits that she is very emotional about the episode  She was discharged on paxil, but has not started it  She denies any suicidal or homicidal thoughts  ROS: Review of Systems   Constitutional: Negative for chills, diaphoresis, fatigue and fever  HENT: Negative for congestion, ear pain, postnasal drip, rhinorrhea, sneezing, sore throat and trouble swallowing  Eyes: Negative for pain and visual disturbance  Respiratory: Negative for apnea, cough, shortness of breath and wheezing  Cardiovascular: Negative for chest pain and palpitations  Gastrointestinal: Negative for abdominal pain, constipation, diarrhea, nausea and vomiting  Genitourinary: Negative for dysuria and hematuria  Musculoskeletal: Negative for arthralgias, gait problem and myalgias  Neurological: Positive for weakness (right arm, right hand, and right leg (improving))   Negative for dizziness, tremors, syncope, facial asymmetry, speech difficulty, light-headedness, numbness and headaches  Psychiatric/Behavioral: Negative for suicidal ideas  The patient is not nervous/anxious          Past Medical History:   Diagnosis Date    Bladder stone     Hyperlipidemia     Ureteral calculus, right        Past Surgical History:   Procedure Laterality Date    GALLBLADDER SURGERY      onset: 2011       Social History     Socioeconomic History    Marital status: /Civil Union     Spouse name: None    Number of children: None    Years of education: None    Highest education level: None   Occupational History    None   Social Needs    Financial resource strain: None    Food insecurity:     Worry: None     Inability: None    Transportation needs:     Medical: None     Non-medical: None   Tobacco Use    Smoking status: Never Smoker    Smokeless tobacco: Never Used   Substance and Sexual Activity    Alcohol use: Yes     Frequency: Monthly or less     Drinks per session: 1 or 2     Binge frequency: Never     Comment: social    Drug use: No    Sexual activity: Yes     Birth control/protection: Post-menopausal   Lifestyle    Physical activity:     Days per week: None     Minutes per session: None    Stress: None   Relationships    Social connections:     Talks on phone: None     Gets together: None     Attends Synagogue service: None     Active member of club or organization: None     Attends meetings of clubs or organizations: None     Relationship status: None    Intimate partner violence:     Fear of current or ex partner: None     Emotionally abused: None     Physically abused: None     Forced sexual activity: None   Other Topics Concern    None   Social History Narrative    No living will       Family History   Problem Relation Age of Onset    Stroke Mother         stroke syndrome    Cancer Maternal Grandmother        Allergies   Allergen Reactions    Penicillins Hives         Current Outpatient Medications:    aspirin (ECOTRIN LOW STRENGTH) 81 mg EC tablet, Take 1 tablet (81 mg total) by mouth daily, Disp: 90 tablet, Rfl: 0    clopidogrel (PLAVIX) 75 mg tablet, Take 1 tablet (75 mg total) by mouth daily for 20 doses, Disp: 20 tablet, Rfl: 0    lovastatin (MEVACOR) 40 MG tablet, Take 1 tablet by mouth once daily, Disp: 90 tablet, Rfl: 3    PARoxetine (PAXIL) 10 mg tablet, Take 1 tablet (10 mg total) by mouth daily at bedtime, Disp: 30 tablet, Rfl: 0      Physical Exam:  /76   Pulse 73   Ht 5' 1" (1 549 m)   Wt 66 2 kg (146 lb)   SpO2 97%   BMI 27 59 kg/m²     Physical Exam   Constitutional: She is oriented to person, place, and time  She appears well-developed and well-nourished  HENT:   Head: Normocephalic and atraumatic  Right Ear: Tympanic membrane, external ear and ear canal normal    Left Ear: Tympanic membrane, external ear and ear canal normal    Nose: Nose normal    Mouth/Throat: Oropharynx is clear and moist and mucous membranes are normal  No oropharyngeal exudate, posterior oropharyngeal edema or posterior oropharyngeal erythema  Eyes: Pupils are equal, round, and reactive to light  EOM are normal    Neck: Normal range of motion  Neck supple  Cardiovascular: Normal rate, regular rhythm and normal heart sounds  Exam reveals no gallop and no friction rub  No murmur heard  Pulmonary/Chest: Effort normal and breath sounds normal  No respiratory distress  She has no wheezes  She has no rales  Musculoskeletal:        Left hand: Decreased strength noted  Decreased  strength in right hand  Decreased strength with hip flexion    Lymphadenopathy:     She has no cervical adenopathy  Neurological: She is alert and oriented to person, place, and time  No cranial nerve deficit  Negative pronator drift  Finger to nose intact  Skin: Skin is warm and dry  Psychiatric: She has a normal mood and affect   Her behavior is normal  Judgment and thought content normal      Labs:  Lab Results Component Value Date    WBC 8 17 07/12/2019    HGB 14 5 07/12/2019    HCT 42 8 07/12/2019    MCV 93 07/12/2019     07/12/2019     Lab Results   Component Value Date     09/07/2015    K 3 5 07/12/2019     07/12/2019    CO2 24 07/12/2019    ANIONGAP 10 09/07/2015    BUN 14 07/12/2019    CREATININE 0 54 (L) 07/12/2019    GLUCOSE 102 09/07/2015    GLUF 91 03/15/2019    CALCIUM 9 7 07/12/2019    AST 21 03/15/2019    ALT 20 03/15/2019    ALKPHOS 101 03/15/2019    PROT 7 3 09/07/2015    BILITOT 0 36 09/07/2015    EGFR 103 07/12/2019

## 2019-07-17 ENCOUNTER — TELEPHONE (OUTPATIENT)
Dept: NEUROLOGY | Facility: CLINIC | Age: 59
End: 2019-07-17

## 2019-07-17 ENCOUNTER — EVALUATION (OUTPATIENT)
Dept: PHYSICAL THERAPY | Facility: CLINIC | Age: 59
End: 2019-07-17
Payer: COMMERCIAL

## 2019-07-17 DIAGNOSIS — I63.512 ACUTE ISCHEMIC LEFT MIDDLE CEREBRAL ARTERY (MCA) STROKE (HCC): Primary | ICD-10-CM

## 2019-07-17 LAB — F5 GENE MUT ANL BLD/T: NORMAL

## 2019-07-17 PROCEDURE — 97110 THERAPEUTIC EXERCISES: CPT | Performed by: PHYSICAL THERAPIST

## 2019-07-17 PROCEDURE — 97161 PT EVAL LOW COMPLEX 20 MIN: CPT | Performed by: PHYSICAL THERAPIST

## 2019-07-17 PROCEDURE — 97535 SELF CARE MNGMENT TRAINING: CPT | Performed by: PHYSICAL THERAPIST

## 2019-07-17 NOTE — PROGRESS NOTES
PT Evaluation     Today's date: 2019  Patient name: Amber Gerardo  : 1960  MRN: [id number]  Referring provider: Wiliam Serrato  Dx:   Encounter Diagnosis     ICD-10-CM    1  Acute ischemic left middle cerebral artery (MCA) stroke [facility]) Y47 [id number] Ambulatory referral to Physical Therapy                  Assessment  Assessment details: Pt is a 60 YO female presenting to PT with pain, decreased AROM, strength and tolerance to activity  Pt would benefit from skilled intervention to address these issues and maximize overall function  Occupation-  at [employer]- off with this injury  Dominant- Right; Involved-Right    Goals  ST  Instruct in safety with loss of strength considerations            2  Increase strength to complete ADL and self care  LT  Increase functional motion and strength for independence with ADL and self care by DC            2  Ability to RTW and recreational activity by DC    Plan  Patient would benefit from: skilled physical therapy  Planned therapy interventions: manual therapy, therapeutic exercise, strengthening and home exercise program  Frequency: 2x week  Duration in weeks: 4  Treatment plan discussed with: family        Subjective Evaluation    History of Present Illness  Date of onset: 7/10/2019  Mechanism of injury: Insidious onset with sudden loss of strength, coordination in her Right U and LE   LE has recovered for independent function    RUE with loss of full coordination and strength  Pain  Current pain ratin  At best pain ratin  At worst pain ratin    Social Support  Lives with: spouse    Employment status: not working  Hand dominance: right    Treatments  Current treatment: physical therapy  Patient Goals  Patient goals for therapy: increased strength, independence with ADLs/IADLs, return to sport/leisure activities and return to work          Objective     General Comments:      Wrist/Hand Comments  AROM right hand -grossly WFL all ranges  Strength-  II- 20/40; 3 MICHAEL- 5/10; Key-7/11  Sensation- intact to LT all digits  Coordination- impaired for FMS         Precautions: avoid unsafe lifting, activity for strength consideration      Manual  7/16            TP HEP                              Exercise Diary  7/17            TP  T 2'            TP 5 finger T 2/10            TP key T 2/10            Jerry ---            Blue III 2/10            twister 20/20            DB E/F ---                 S/P ---            Nibs T 14            Jhon's ---            FF/Scapt ---                                                                Manju Erik ---            Comp  XS 2'                                                       Modalities

## 2019-07-17 NOTE — TELEPHONE ENCOUNTER
The purpose of this phone call is to assess patient's general wellbeing or for any assistance needed with follow-up care  Called patient since discharge, she has not experienced any new or worsening stroke symptoms  State she does not fell that she has weakness of her right hand but does continue to have difficulty writing at this time  She denies weakness of the right leg, she ambulates independently  Patient preforms all her own ADLs without assistance, she also manages her own medications, appointments, and affairs  Patient had a follow up with her PCP yesterday  she has her stroke hospital follow up appointment scheduled 8/22 with Dr Magdy Almanzar  Initial PT eval was today, states PT is scheduled twice a week and she will have her OT eval tomorrow (7/18)  I reviewed medications with her  Patient discontinued clariton and vitamin C per PCP, no additional changes at this time  she had no difficulties obtaining medications  Reports taking all as prescribed with no missed doses or medication side effects  Patient denies signs of bleeding  Reviewed medication instructions per Josh Coto PA-C's note and the AVS for 3 weeks of dual antiplatelet therapy and then to continue on only aspirin and discontinue the plavix  Patient verbalizes understanding, states she will also be discussing with cardiology  Patient states she is not taking paxil  I reviewed stroke symptoms and risk factor management with her  she verbalizes understanding of information  Patient monitors BP at home, average 110/73 - 128/76, highest   she is a non smoker  Patient states she is trying to follow cholesterol diet  Answered all patient questions  She does not have any further questions or concerns at this time

## 2019-07-18 ENCOUNTER — OFFICE VISIT (OUTPATIENT)
Dept: PHYSICAL THERAPY | Facility: CLINIC | Age: 59
End: 2019-07-18
Payer: COMMERCIAL

## 2019-07-18 DIAGNOSIS — I63.512 ACUTE ISCHEMIC LEFT MIDDLE CEREBRAL ARTERY (MCA) STROKE (HCC): Primary | ICD-10-CM

## 2019-07-18 LAB — F2 GENE MUT ANL BLD/T: NORMAL

## 2019-07-18 PROCEDURE — 97535 SELF CARE MNGMENT TRAINING: CPT

## 2019-07-18 PROCEDURE — 97110 THERAPEUTIC EXERCISES: CPT

## 2019-07-18 NOTE — PROGRESS NOTES
Daily Note     Today's date: 2019  Patient name: Ban Villasenor  : 1960  MRN: [id number]  Referring provider: Yosi Spencer  Dx:   Encounter Diagnosis     ICD-10-CM    1  Acute ischemic left middle cerebral artery (MCA) stroke ([facility]) D69 052                   Subjective: Pt reports no soreness post last initial session  Objective: Wrist/Hand Comments  AROM right hand -grossly WFL all ranges  Strength-  II- 20/40; 3 MICHAEL- 5/10; Key-  Sensation- intact to LT all digits  Coordination- impaired for [facility] treatment diary below      Assessment: Tolerated treatment well  Pt responded well to new progresses  Plan: Progress treatment as tolerated         Precautions: avoid unsafe lifting, activity for strength consideration      Manual             TP HEP                              Exercise Diary             TP  T 2' T 2'           TP 5 finger T 10 T 2/10           TP key T 2/10 T 2/10           Jerry --- 10# 2/10           Blue III 2/10 III 2/10           twister 20 20/20           DB E/F --- 2# 210                S/P --- Wand 2/10           Nibs T 15 T 14'           Zottman's --- 2# 2/10           FF/Scapt --- 2# 2/10                                                               Flori --- R 13x L 22  Assembly R 4x L 8x           Comp  XS 2' XS 2'                                                      Modalities

## 2019-07-22 ENCOUNTER — OFFICE VISIT (OUTPATIENT)
Dept: PHYSICAL THERAPY | Facility: CLINIC | Age: 59
End: 2019-07-22
Payer: COMMERCIAL

## 2019-07-22 DIAGNOSIS — I63.512 ACUTE ISCHEMIC LEFT MIDDLE CEREBRAL ARTERY (MCA) STROKE (HCC): Primary | ICD-10-CM

## 2019-07-22 PROCEDURE — 97110 THERAPEUTIC EXERCISES: CPT

## 2019-07-22 PROCEDURE — 97535 SELF CARE MNGMENT TRAINING: CPT

## 2019-07-22 NOTE — H&P (VIEW-ONLY)
First Cardiology Office Visit:  07/22/19  Paulo Braswell [facility]  1960  [id number]        ASSESSMENT:  1  Recent CVA 07/11/2019 with tPA administration  2  Dyslipidemia  3  Pulmonary nodules     PLAN:  1  Recent CVA:   -dual anti-platelet therapy per Neurology   -will arrange implantation of cardiac loop recorder to monitor for dysrhythmia    2  Dyslipidemia:   -on statin therapy     Ms  [facility] has brought a record of her blood pressure today and is very well controlled without medications  Follow-up 6 months  Likely follow-up p r n  thereafter    ======================================================  History of Present Illness:    Delores Schaumann is a 59 y o  female with a relatively unremarkable past medical history which includes dyslipidemia  She recently presented to [facility] secondary to acute onset weakness on the right side of her body  She was seen and evaluated in emergency department and was administered tPA as per stroke protocol  She was then admitted to the critical care unit  An MRI of the brain was performed which showed small residual area within the MCA territory of ischemia which was not salvage by tPA  She was seen and evaluated by Neurology who recommend dual anti-platelet therapy for 3 weeks (starting 07/12/2019) and aspirin 162 mg daily thereafter  She had an echocardiogram performed on her admission which was largely unremarkable  On discharge he was advised that she make a follow-up appointment with cardiology to rule an embolic source of her CVA  She is doing well today  She has regained strength in her right leg and feels as if her right arm is almost back to normal   A discussion about her cardiac history she has no significant cardiac past medical history  She does not normally get palpitations, chest pain, shortness of breath her daily life  She does not get dizziness    In that time period leading up to her recent CVA she had no palpitations, chest pain, or dizziness  She does note that she has a family history of arrhythmias however  Her and her  had several questions today regarding the indications for cardiac loop recorder  All questions were answered  Review of Systems  Review of Systems   Constitutional: Negative for chills, fatigue, fever and unexpected weight change  HENT: Negative for congestion, nosebleeds, sinus pressure and sinus pain  Respiratory: Negative for cough, chest tightness and shortness of breath  Cardiovascular: Negative for chest pain, palpitations and leg swelling  Gastrointestinal: Negative for abdominal distention, diarrhea, nausea and vomiting  Genitourinary: Negative for dysuria and hematuria  Musculoskeletal: Negative for arthralgias  Neurological: Negative for dizziness, syncope, light-headedness and numbness  Weakness in right hand   Hematological: Negative for adenopathy  Does not bruise/bleed easily  Past Medical History:   Diagnosis Date    Bladder stone     Hyperlipidemia     Ureteral calculus, right        Allergies   Allergen Reactions    Penicillins Hives     I reviewed the Home Medication list in the chart       Family History   Problem Relation Age of Onset    Stroke Mother         stroke syndrome    Cancer Maternal Grandmother        Social History     Socioeconomic History    Marital status: /Civil Union     Spouse name: Not on file    Number of children: Not on file    Years of education: Not on file    Highest education level: Not on file   Occupational History    Not on file   Social Needs    Financial resource strain: Not on file    Food insecurity:     Worry: Not on file     Inability: Not on file    Transportation needs:     Medical: Not on file     Non-medical: Not on file   Tobacco Use    Smoking status: Never Smoker    Smokeless tobacco: Never Used   Substance and Sexual Activity    Alcohol use: Yes     Frequency: Monthly or less Drinks per session: 1 or 2     Binge frequency: Never     Comment: social    Drug use: No    Sexual activity: Yes     Birth control/protection: Post-menopausal   Lifestyle    Physical activity:     Days per week: Not on file     Minutes per session: Not on file    Stress: Not on file   Relationships    Social connections:     Talks on phone: Not on file     Gets together: Not on file     Attends Voodoo service: Not on file     Active member of club or organization: Not on file     Attends meetings of clubs or organizations: Not on file     Relationship status: Not on file    Intimate partner violence:     Fear of current or ex partner: Not on file     Emotionally abused: Not on file     Physically abused: Not on file     Forced sexual activity: Not on file   Other Topics Concern    Not on file   Social History Narrative    No living will       There were no vitals filed for this visit  Physical Exam:  Physical Exam   Constitutional: She is oriented to person, place, and time  She appears well-developed and well-nourished  HENT:   Head: Normocephalic and atraumatic  Right Ear: External ear normal    Left Ear: External ear normal    Eyes: Pupils are equal, round, and reactive to light  EOM are normal    Neck: Normal range of motion  No JVD present  Cardiovascular: Normal rate and regular rhythm  Exam reveals no gallop and no friction rub  No murmur heard  Pulmonary/Chest: Effort normal and breath sounds normal  She has no wheezes  She has no rales  Abdominal: Soft  She exhibits no distension  There is no tenderness  Musculoskeletal: Normal range of motion  She exhibits no edema or deformity  Neurological: She is alert and oriented to person, place, and time  Detailed neurologic examination deferred   Psychiatric: She has a normal mood and affect   Her behavior is normal          Pertinent Diagnostics:    EKG:  CXR:  Telemetry:  Echocardiogram:  Ischemic Testing:  Laboratory:  CT Imaging:            I have personally reviewed the EKG, CXR and Telemetry images directly  Portions of the record may have been created with voice recognition software  Occasional wrong word or "sound a like" substitutions may have occurred due to the inherent limitations of voice recognition software  Read the chart carefully and recognize, using context, where substitutions have occurred

## 2019-07-22 NOTE — PROGRESS NOTES
First Cardiology Office Visit:  07/22/19  Germán Butler [facility]  1960  [id number]        ASSESSMENT:  1  Recent CVA 07/11/2019 with tPA administration  2  Dyslipidemia  3  Pulmonary nodules     PLAN:  1  Recent CVA:   -dual anti-platelet therapy per Neurology   -will arrange implantation of cardiac loop recorder to monitor for dysrhythmia    2  Dyslipidemia:   -on statin therapy     Ms  [facility] has brought a record of her blood pressure today and is very well controlled without medications  Follow-up 6 months  Likely follow-up p r n  thereafter    ======================================================  History of Present Illness:    Mira Barreto is a 59 y o  female with a relatively unremarkable past medical history which includes dyslipidemia  She recently presented to [facility] secondary to acute onset weakness on the right side of her body  She was seen and evaluated in emergency department and was administered tPA as per stroke protocol  She was then admitted to the critical care unit  An MRI of the brain was performed which showed small residual area within the MCA territory of ischemia which was not salvage by tPA  She was seen and evaluated by Neurology who recommend dual anti-platelet therapy for 3 weeks (starting 07/12/2019) and aspirin 162 mg daily thereafter  She had an echocardiogram performed on her admission which was largely unremarkable  On discharge he was advised that she make a follow-up appointment with cardiology to rule an embolic source of her CVA  She is doing well today  She has regained strength in her right leg and feels as if her right arm is almost back to normal   A discussion about her cardiac history she has no significant cardiac past medical history  She does not normally get palpitations, chest pain, shortness of breath her daily life  She does not get dizziness    In that time period leading up to her recent CVA she had no palpitations, chest pain, or dizziness  She does note that she has a family history of arrhythmias however  Her and her  had several questions today regarding the indications for cardiac loop recorder  All questions were answered  Review of Systems  Review of Systems   Constitutional: Negative for chills, fatigue, fever and unexpected weight change  HENT: Negative for congestion, nosebleeds, sinus pressure and sinus pain  Respiratory: Negative for cough, chest tightness and shortness of breath  Cardiovascular: Negative for chest pain, palpitations and leg swelling  Gastrointestinal: Negative for abdominal distention, diarrhea, nausea and vomiting  Genitourinary: Negative for dysuria and hematuria  Musculoskeletal: Negative for arthralgias  Neurological: Negative for dizziness, syncope, light-headedness and numbness  Weakness in right hand   Hematological: Negative for adenopathy  Does not bruise/bleed easily  Past Medical History:   Diagnosis Date    Bladder stone     Hyperlipidemia     Ureteral calculus, right        Allergies   Allergen Reactions    Penicillins Hives     I reviewed the Home Medication list in the chart       Family History   Problem Relation Age of Onset    Stroke Mother         stroke syndrome    Cancer Maternal Grandmother        Social History     Socioeconomic History    Marital status: /Civil Union     Spouse name: Not on file    Number of children: Not on file    Years of education: Not on file    Highest education level: Not on file   Occupational History    Not on file   Social Needs    Financial resource strain: Not on file    Food insecurity:     Worry: Not on file     Inability: Not on file    Transportation needs:     Medical: Not on file     Non-medical: Not on file   Tobacco Use    Smoking status: Never Smoker    Smokeless tobacco: Never Used   Substance and Sexual Activity    Alcohol use: Yes     Frequency: Monthly or less Drinks per session: 1 or 2     Binge frequency: Never     Comment: social    Drug use: No    Sexual activity: Yes     Birth control/protection: Post-menopausal   Lifestyle    Physical activity:     Days per week: Not on file     Minutes per session: Not on file    Stress: Not on file   Relationships    Social connections:     Talks on phone: Not on file     Gets together: Not on file     Attends Latter day service: Not on file     Active member of club or organization: Not on file     Attends meetings of clubs or organizations: Not on file     Relationship status: Not on file    Intimate partner violence:     Fear of current or ex partner: Not on file     Emotionally abused: Not on file     Physically abused: Not on file     Forced sexual activity: Not on file   Other Topics Concern    Not on file   Social History Narrative    No living will       There were no vitals filed for this visit  Physical Exam:  Physical Exam   Constitutional: She is oriented to person, place, and time  She appears well-developed and well-nourished  HENT:   Head: Normocephalic and atraumatic  Right Ear: External ear normal    Left Ear: External ear normal    Eyes: Pupils are equal, round, and reactive to light  EOM are normal    Neck: Normal range of motion  No JVD present  Cardiovascular: Normal rate and regular rhythm  Exam reveals no gallop and no friction rub  No murmur heard  Pulmonary/Chest: Effort normal and breath sounds normal  She has no wheezes  She has no rales  Abdominal: Soft  She exhibits no distension  There is no tenderness  Musculoskeletal: Normal range of motion  She exhibits no edema or deformity  Neurological: She is alert and oriented to person, place, and time  Detailed neurologic examination deferred   Psychiatric: She has a normal mood and affect   Her behavior is normal          Pertinent Diagnostics:    EKG:  CXR:  Telemetry:  Echocardiogram:  Ischemic Testing:  Laboratory:  CT Imaging:            I have personally reviewed the EKG, CXR and Telemetry images directly  Portions of the record may have been created with voice recognition software  Occasional wrong word or "sound a like" substitutions may have occurred due to the inherent limitations of voice recognition software  Read the chart carefully and recognize, using context, where substitutions have occurred

## 2019-07-22 NOTE — PROGRESS NOTES
Daily Note     Today's date: 2019  Patient name: Nik Man  : 1960  MRN: [id number]  Referring provider: Lili Steiner  Dx:   Encounter Diagnosis     ICD-10-CM    1  Acute ischemic left middle cerebral artery (MCA) stroke ([facility]) K19 741                   Subjective: Pt reports no soreness from last session  Pt being more active at home and utilizes her R hand  Pt realizes her deficits  Objective:Wrist/Hand Comments  AROM right hand -grossly WFL all ranges  Strength-  II- 20/40; 3 MICHAEL- 5/10; Key-  Sensation- intact to LT all digits  Coordination- impaired for FMS   See treatment diary below      Assessment: Tolerated treatment well today  Pt able to progress appropriately today  Pt displayed fatigue  Pt cued to take breaks       Plan:Continue with current program,    Precautions: avoid unsafe lifting, activity for strength consideration      Manual            TP HEP                              Exercise Diary            TP  T 2' T 2' T 2'          TP 5 finger T 2/10 T 2/10 T 2/10          TP key T 10 T 10 T 2/10          Jerry --- 10# 2/10 10# 2/10          Blue III 2/10 III 2/10 III 2/10          twister 20/20 20/20 20/20          DB E/F --- 2# 210 3# 210               S/P --- Wand 2/10 Wand 2/10          Nibs T 14 T 14' T 14'          Zottman's --- 2# 2/10 3# 210          FF/Scapt --- 2# 2/10 3# 2/10                                                              Flori --- R 13x L 22  Assembly R 4x L 8x Full assembly 2'          Comp  XS 2' XS 2' Y 3'                                                     Modalities

## 2019-07-23 ENCOUNTER — OFFICE VISIT (OUTPATIENT)
Dept: CARDIOLOGY CLINIC | Facility: CLINIC | Age: 59
End: 2019-07-23
Payer: COMMERCIAL

## 2019-07-23 VITALS
BODY MASS INDEX: 27.19 KG/M2 | WEIGHT: 144 LBS | HEART RATE: 80 BPM | DIASTOLIC BLOOD PRESSURE: 80 MMHG | SYSTOLIC BLOOD PRESSURE: 118 MMHG | HEIGHT: 61 IN | OXYGEN SATURATION: 97 %

## 2019-07-23 DIAGNOSIS — E78.49 OTHER HYPERLIPIDEMIA: ICD-10-CM

## 2019-07-23 DIAGNOSIS — I63.512 ACUTE ISCHEMIC LEFT MIDDLE CEREBRAL ARTERY (MCA) STROKE (HCC): Primary | ICD-10-CM

## 2019-07-23 PROCEDURE — 99213 OFFICE O/P EST LOW 20 MIN: CPT | Performed by: PHYSICIAN ASSISTANT

## 2019-07-25 ENCOUNTER — OFFICE VISIT (OUTPATIENT)
Dept: FAMILY MEDICINE CLINIC | Facility: CLINIC | Age: 59
End: 2019-07-25
Payer: COMMERCIAL

## 2019-07-25 ENCOUNTER — OFFICE VISIT (OUTPATIENT)
Dept: PHYSICAL THERAPY | Facility: CLINIC | Age: 59
End: 2019-07-25
Payer: COMMERCIAL

## 2019-07-25 VITALS
HEIGHT: 61 IN | BODY MASS INDEX: 27.19 KG/M2 | DIASTOLIC BLOOD PRESSURE: 82 MMHG | SYSTOLIC BLOOD PRESSURE: 120 MMHG | WEIGHT: 144 LBS

## 2019-07-25 DIAGNOSIS — I63.512 ACUTE ISCHEMIC LEFT MIDDLE CEREBRAL ARTERY (MCA) STROKE (HCC): Primary | ICD-10-CM

## 2019-07-25 PROCEDURE — 99213 OFFICE O/P EST LOW 20 MIN: CPT | Performed by: FAMILY MEDICINE

## 2019-07-25 PROCEDURE — 97110 THERAPEUTIC EXERCISES: CPT | Performed by: PHYSICAL THERAPIST

## 2019-07-25 PROCEDURE — 97535 SELF CARE MNGMENT TRAINING: CPT | Performed by: PHYSICAL THERAPIST

## 2019-07-25 NOTE — PROGRESS NOTES
Assessment/Plan:  Patient will continue physical therapy  Continue to follow up with Cardiology and Neurology as scheduled  She will see her back in 1 month or p r n  Radha Joseph Paperwork filled out for her  Problem List Items Addressed This Visit        Cardiovascular and Mediastinum    Acute ischemic left middle cerebral artery (MCA) stroke (HCC) - Primary           Diagnoses and all orders for this visit:    Acute ischemic left middle cerebral artery (MCA) stroke (HCC)        No problem-specific Assessment & Plan notes found for this encounter  Subjective:      Patient ID: Olivia Flores is a 62 y o  female  Patient here today for follow-up on her CVA  Patient is doing better  She is in physical therapy for her right hand  She has no weakness in her right leg  She is getting stronger, but 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  Cerebrovascular Accident   This is a new problem  The current episode started 1 to 4 weeks ago  The problem has been gradually improving  Associated symptoms include weakness (Right hand)  Nothing aggravates the symptoms  Treatments tried: On Plavix and aspirin  In physical therapy  The treatment provided significant relief  The following portions of the patient's history were reviewed and updated as appropriate:   She has a past medical history of Bladder stone, Hyperlipidemia, and Ureteral calculus, right ,  does not have any pertinent problems on file  ,   has a past surgical history that includes Gallbladder surgery  ,  family history includes Cancer in her maternal grandmother; Stroke in her mother  ,   reports that she has never smoked  She has never used smokeless tobacco  She reports that she drinks alcohol  She reports that she does not use drugs  ,  is allergic to penicillins     Current Outpatient Medications   Medication Sig Dispense Refill    aspirin (ECOTRIN LOW STRENGTH) 81 mg EC tablet Take 1 tablet (81 mg total) by mouth daily 90 tablet 0    clopidogrel (PLAVIX) 75 mg tablet Take 1 tablet (75 mg total) by mouth daily for 20 doses 20 tablet 0    lovastatin (MEVACOR) 40 MG tablet Take 1 tablet by mouth once daily 90 tablet 3     No current facility-administered medications for this visit  Review of Systems   Constitutional: Negative  Respiratory: Negative  Cardiovascular: Negative  Gastrointestinal: Negative  Genitourinary: Negative  Neurological: Positive for weakness (Right hand)  Objective:  Vitals:    07/25/19 1451   BP: 120/82   Weight: 65 3 kg (144 lb)   Height: 5' 1" (1 549 m)     Body mass index is 27 21 kg/m²  Physical Exam   Constitutional: She is oriented to person, place, and time  She appears well-developed and well-nourished  No distress  HENT:   Head: Normocephalic and atraumatic  Eyes: Conjunctivae are normal    Cardiovascular: Normal rate, regular rhythm and normal heart sounds  Exam reveals no gallop and no friction rub  No murmur heard  Pulmonary/Chest: Effort normal and breath sounds normal  No respiratory distress  She has no wheezes  She has no rales  Musculoskeletal: She exhibits no edema  Neurological: She is alert and oriented to person, place, and time  Decreased right hand  compared to the left  Skin: She is not diaphoretic  Psychiatric: She has a normal mood and affect  Her behavior is normal  Judgment and thought content normal    Vitals reviewed

## 2019-07-25 NOTE — PROGRESS NOTES
PT Re-Evaluation     Today's date: 2019  Patient name: Christiana Armando  : 1960  MRN: [id number]  Referring provider: Zak Quinteros  Dx:   Encounter Diagnosis     ICD-10-CM    1  Acute ischemic left middle cerebral artery (MCA) stroke [facility]) [id number]                   Assessment  Assessment details: Pt is a 58 YO female presenting to PT with pain, decreased AROM, strength and tolerance to activity  Pt would benefit from skilled intervention to address these issues and maximize overall function  Occupation-  at [employer]- off with this injury  Dominant- Right; Involved-Right  Pt has progressed with functional dexterity and strengthening exercises and is progressing steadily with her HEP    Goals  ST  Instruct in safety with loss of strength considerations            2  Increase strength to complete ADL and self care  LT  Increase functional motion and strength for independence with ADL and self care by DC            2  Ability to RTW and recreational activity by DC    Plan  Planned therapy interventions: therapeutic exercise, strengthening and home exercise program  Frequency: 2x week  Duration in weeks: 4  Treatment plan discussed with: family        Subjective Evaluation    History of Present Illness  Date of onset: 7/10/2019  Mechanism of injury: Insidious onset with sudden loss of strength, coordination in her Right U and LE   LE has recovered for independent function    RUE with loss of full coordination and strength  Pain  Current pain ratin  At best pain ratin  At worst pain ratin    Social Support  Lives with: spouse    Employment status: not working  Hand dominance: right    Treatments  Current treatment: physical therapy  Patient Goals  Patient goals for therapy: increased strength, independence with ADLs/IADLs, return to sport/leisure activities and return to work          Objective     General Comments:      Wrist/Hand Comments  AROM right hand -grossly WFL all ranges  Strength-  II- 25/35; 3 MICHAEL- 8/10; Key-8/11                  Dynamometer- biceps-R/L-30/35 @ 86%;  Shoulder FE- 30/38 @81%  Sensation- intact to LT all digits  Coordination- impaired for FMS- assembly- R/L-20/22 @94%         Precautions: avoid unsafe lifting, activity for strength consideration    Manual  7/16 7/18 7/22 7/25               TP HEP        Y TP                                             Exercise Diary  7/17 7/18 7/22 7/25               TP  T 2' T 2' T 2'  T 2'               TP 5 finger T 2/10 T 2/10 T 2/10  T 2/10               TP key T 2/10 T 2/10 T 2/10  T 2/10               Jerry --- 10# 2/10 10# 2/10  25 2/10               Blue III 2/10 III 2/10 III 2/10  IV 2/10               twister 20/20 20/20 20/20 dc               DB E/F --- 2# 2/10 3# 2/10  4 2/10                    S/P --- Wand 2/10 Wand 2/10  1 2/10               Nibs T 14 T 14' T 14'  T 14               Zottman's --- 2# 2/10 3# 2/10  4 2/10               FF/Scapt --- 2# 2/10 3# 2/10  4 2/10                                                                                                               Flori --- R 13x L 22  Assembly R 4x L 8x Full assembly 2'  2'               Comp  XS 2' XS 2' Y 3'  Y 3'                UBE        2/2                                                                     Modalities

## 2019-07-26 ENCOUNTER — TELEPHONE (OUTPATIENT)
Dept: CARDIOLOGY CLINIC | Facility: CLINIC | Age: 59
End: 2019-07-26

## 2019-07-26 NOTE — TELEPHONE ENCOUNTER
Pt is scheduled on 08/06/19 for a loop implant with either Dr Marcelino Vidal or Dr Kristie Rico      Gave pt instructions over the phone

## 2019-07-29 ENCOUNTER — OFFICE VISIT (OUTPATIENT)
Dept: PHYSICAL THERAPY | Facility: CLINIC | Age: 59
End: 2019-07-29
Payer: COMMERCIAL

## 2019-07-29 DIAGNOSIS — I63.512 ACUTE ISCHEMIC LEFT MIDDLE CEREBRAL ARTERY (MCA) STROKE (HCC): Primary | ICD-10-CM

## 2019-07-29 PROCEDURE — 97110 THERAPEUTIC EXERCISES: CPT

## 2019-07-29 NOTE — PROGRESS NOTES
Daily Note     Today's date: 2019  Patient name: Narinder Soto  : 1960  MRN: [id number]  Referring provider: Darci Mensah  Dx:   Encounter Diagnosis     ICD-10-CM    1  Acute ischemic left middle cerebral artery (MCA) stroke ([facility]) I63 [id number]                   Subjective:Pt  reports she dropped a can of peanuts this past weekend  Pt  does feel she is improving overall  Objective: See treatment diary below  Wrist/Hand Comments  AROM right hand -grossly WFL all ranges  Strength-  II- ; 3 MICHAEL- 8/10; Key-                  Dynamometer- biceps-R/L- @ 86%; Shoulder FE-  @81%  Sensation- intact to LT all digits  Coordination- impaired for FMS- assembly- R/L- @94%         Precautions: avoid unsafe lifting, activity for strength consideration    Assessment: Tolerated treatment well  Patient would benefit from continued PT  While pt  was on UBE, The machine did come into contact with her breast  Pt  was questioned several times if she was ok and responded yes  Will moniter pt  as she is subject to bruising  VC for proper sequencing with all standing TE as well as being aware of placement of IF with gripping activities  Plan: Progress treatment as tolerated         Precautions: avoid unsafe lifting, activity for strength consideration    Manual               TP HEP        Y TP                                             Exercise Diary               TP  T 2' T 2' T 2'  T 2'  Y 2'             TP 5 finger T 2/10 T 2/10 T 2/10  T 2/10  Y  /10             TP key T 2/10 T 2/10 T 2/10  T 2/10  Y  2/10             Jerry --- 10# 2/10 10# 2/10  25 2/10  25  2/10             Blue III 2/10 III 2/10 III 2/10  IV 2/10  IV  2/10             twister 20/20 20/20 20/20 dc               DB E/F --- 2# 2/10 3# 2/10  4 2/10  4  2/10                  S/P --- Wand 2/10 Wand 2/10  1 2/10  1  2/10             Nibs T 14 T 14' T 14'  T 14  T 14             Jhon's --- 2# 2/10 3# 2/10  4 2/10  4  2/10             FF/Scapt --- 2# 2/10 3# 2/10  4 2/10  4  2/10                                                                                                             Flori --- R 13x L 22  Assembly R 4x L 8x Full assembly 2'  2'  2'             Comp  XS 2' XS 2' Y 3'  Y 3'  Y 3'              UBE        2/2 2/2                                                                   Modalities

## 2019-07-31 ENCOUNTER — OFFICE VISIT (OUTPATIENT)
Dept: PHYSICAL THERAPY | Facility: CLINIC | Age: 59
End: 2019-07-31
Payer: COMMERCIAL

## 2019-07-31 DIAGNOSIS — I63.512 ACUTE ISCHEMIC LEFT MIDDLE CEREBRAL ARTERY (MCA) STROKE (HCC): Primary | ICD-10-CM

## 2019-07-31 PROCEDURE — 97110 THERAPEUTIC EXERCISES: CPT | Performed by: PHYSICAL THERAPIST

## 2019-07-31 NOTE — PROGRESS NOTES
Daily Note     Today's date: 2019  Patient name: Lorenzo Cardenas  : 1960  MRN: [id number]  Referring provider: Geetha Gustafson  Dx:   Encounter Diagnosis     ICD-10-CM    1  Acute ischemic left middle cerebral artery (MCA) stroke (HCC) R35 [id number]                   Subjective: Pt  reports she is writing as well as using her puddy at home  Pt  to get  defibrillator next Tuesday  Objective: See treatment diary below  Wrist/Hand Comments  AROM right hand -grossly WFL all ranges  Strength-  II- ; 3 MICHAEL- 8/10; Key-                  Dynamometer- biceps-R/L- @ 86%; Shoulder FE-  @81%  Sensation- intact to LT all digits  Coordination- impaired for FMS- assembly- R/L- @94%         Precautions: avoid unsafe lifting, activity for strength consideration      Assessment: Tolerated treatment well  Patient exhibited good technique with therapeutic exercises and would benefit from continued PT  Pt  with increased awareness of finger placement with functional activities  Pt  Denies bruising or pain from contact with UBE last RX  Plan: Progress treatment as tolerated         Precautions: avoid unsafe lifting, activity for strength consideration    Manual             TP HEP        Y TP                                             Exercise Diary             TP  T 2' T 2' T 2'  T 2'  Y 2'  Y 2'           TP 5 finger T /10 T 2/10 T 2/10  T 2/10  Y  2/10  Y  /10           TP key T /10 T 2/10 T 2/10  T 2/10  Y  2/10  Y  /10           Jerry --- 10# 2/10 10# 2/10  25 2/10  25  2/10  25  2/10           Blue III 2/10 III 2/10 III 2/10  IV 2/10  IV  2/10  IV  /10           twister 20/20 20/20 20/20 dc               DB E/F --- 2# 2/10 3# 2/10  4 2/10  4  2/10  4  2/10                S/P --- Wand 2/10 Wand 2/10  1 2/10  1  2/10  1  /10           Nibs T 14 T 14' T 14'  T 14  T 14  T 14           Jhon's --- 2# 2/10 3# 2/10  4 2/10  4  2/10  4  2/10           FF/Scapt --- 2# 2/10 3# 2/10  4 2/10  4  2/10  4  2/10                                                                                                           Flori --- R 13x L 22  Assembly R 4x L 8x Full assembly 2'  2'  2'  3'           Comp  XS 2' XS 2' Y 3'  Y 3'  Y 3'  Y 3'            UBE        2/2  2/2  3/2                                                                 Modalities

## 2019-08-05 ENCOUNTER — OFFICE VISIT (OUTPATIENT)
Dept: PHYSICAL THERAPY | Facility: CLINIC | Age: 59
End: 2019-08-05
Payer: COMMERCIAL

## 2019-08-05 ENCOUNTER — TELEPHONE (OUTPATIENT)
Dept: INPATIENT UNIT | Facility: HOSPITAL | Age: 59
End: 2019-08-05

## 2019-08-05 DIAGNOSIS — I63.512 ACUTE ISCHEMIC LEFT MIDDLE CEREBRAL ARTERY (MCA) STROKE (HCC): Primary | ICD-10-CM

## 2019-08-05 PROCEDURE — 97110 THERAPEUTIC EXERCISES: CPT

## 2019-08-05 NOTE — PROGRESS NOTES
Daily Note     Today's date: 2019  Patient name: Chino Gutierrez  : 1960  MRN: [id number]  Referring provider: Lizzie Villalobos  Dx:   Encounter Diagnosis     ICD-10-CM    1  Acute ischemic left middle cerebral artery (MCA) stroke (HCC) X78 270                   Subjective: Pt  reports she is doing more at home  Pt  to have defibrillator inserted tmrw  Objective: See treatment diary below  Wrist/Hand Comments  AROM right hand -grossly WFL all ranges  Strength-  II- ; 3 MICHAEL- 8/10; Key-                  Dynamometer- biceps-R/L- @ 86%; Shoulder FE-  @81%  Sensation- intact to LT all digits  Coordination- impaired for FMS- assembly- R/L- @94%         Precautions: avoid unsafe lifting, activity for strength consideration        Assessment: Tolerated treatment well  Patient exhibited good technique with therapeutic exercises and would benefit from continued PT  Noted improved  of IF during TE  Plan: Progress treatment as tolerated         Precautions: avoid unsafe lifting, activity for strength consideration    Manual    8/5         TP HEP        Y TP                                             Exercise Diary    8/5         TP  T 2' T 2' T 2'  T 2'  Y 2'  Y 2'  Y 2'         TP 5 finger T 2/10 T 2/10 T 2/10  T 2/10  Y  2/10  Y  2/10  Y 2/10         TP key T /10 T 2/10 T 2/10  T 2/10  Y  2/10  Y  2/10  Y  2/10         Jerry --- 10# 2/10 10# 2/10  25 2/10  25  2/10  25  2/10  25  2/10         Blue III 2/10 III 2/10 III 2/10  IV 2/10  IV  2/10  IV  2/10  IV  2/10         twister 20/20 20/20 20/20 dc               DB E/F --- 2# 2/10 3# 2/10  4 2/10  4  2/10  4  2/10  4  2/10              S/P --- Wand 2/10 Wand 2/10  1 2/10  1  2/10  1  2/10  1 5  2/10         Nibs T 14 T 14' T 14'  T 14  T 14  T 14  T 14         Jhon's --- 2# 2/10 3# 2/10  4 2/10  4  /10  4  /10  4  2/10         FF/Dwightt --- 2# 2/10 3# 2/10  4 2/10  4  2/10  4  2/10  4  2/10                                                                                                         Flori --- R 13x L 22  Assembly R 4x L 8x Full assembly 2'  2'  2'  3'  3'         Comp  XS 2' XS 2' Y 3'  Y 3'  Y 3'  Y 3'  Y 3'          UBE        2/2  2/2  3/2  3/3                                                               Modalities

## 2019-08-06 ENCOUNTER — HOSPITAL ENCOUNTER (OUTPATIENT)
Dept: NON INVASIVE DIAGNOSTICS | Facility: HOSPITAL | Age: 59
Setting detail: OUTPATIENT SURGERY
Discharge: HOME | End: 2019-08-06
Attending: INTERNAL MEDICINE | Admitting: INTERNAL MEDICINE
Payer: COMMERCIAL

## 2019-08-06 VITALS
HEART RATE: 76 BPM | DIASTOLIC BLOOD PRESSURE: 72 MMHG | WEIGHT: 140 LBS | RESPIRATION RATE: 18 BRPM | TEMPERATURE: 98 F | SYSTOLIC BLOOD PRESSURE: 135 MMHG | HEIGHT: 61 IN | BODY MASS INDEX: 26.43 KG/M2 | OXYGEN SATURATION: 98 %

## 2019-08-06 DIAGNOSIS — Z95.818 STATUS POST PLACEMENT OF IMPLANTABLE LOOP RECORDER: Primary | ICD-10-CM

## 2019-08-06 DIAGNOSIS — I63.512 ACUTE ISCHEMIC LEFT MIDDLE CEREBRAL ARTERY (MCA) STROKE (HCC): ICD-10-CM

## 2019-08-06 PROCEDURE — C1764 EVENT RECORDER, CARDIAC: HCPCS

## 2019-08-06 PROCEDURE — 33285 INSJ SUBQ CAR RHYTHM MNTR: CPT

## 2019-08-06 PROCEDURE — 33285 INSJ SUBQ CAR RHYTHM MNTR: CPT | Performed by: INTERNAL MEDICINE

## 2019-08-06 RX ORDER — LIDOCAINE HYDROCHLORIDE 10 MG/ML
INJECTION, SOLUTION INFILTRATION; PERINEURAL CODE/TRAUMA/SEDATION MEDICATION
Status: COMPLETED | OUTPATIENT
Start: 2019-08-06 | End: 2019-08-06

## 2019-08-06 RX ORDER — ASCORBIC ACID 500 MG
500 TABLET ORAL DAILY
COMMUNITY

## 2019-08-06 RX ORDER — OMEPRAZOLE 20 MG/1
20 CAPSULE, DELAYED RELEASE ORAL DAILY
COMMUNITY
End: 2022-01-13 | Stop reason: SDUPTHER

## 2019-08-06 RX ADMIN — LIDOCAINE HYDROCHLORIDE 8 ML: 10 INJECTION, SOLUTION INFILTRATION; PERINEURAL at 10:42

## 2019-08-06 NOTE — DISCHARGE INSTRUCTIONS
Keep loop recorder incision dry for one week  Do not use lotions/powders/creams on incision  Remove outer bandage 48 hours after procedure - if present, leave underlying steri-strips in place, they will either fall off on their own or will be removed at 2 week follow up appointment  Please call the office [phone] if you notice redness, swelling, bleeding, or drainage from incision or if you develop fevers  Cardiac Loop Recorder Insertion      WHAT YOU SHOULD KNOW:    A cardiac loop recorder is a device used to diagnose heart rhythm problems, such as a fast or irregular heartbeat  It is implanted in your left chest, just under the skin  The device records a pattern of your heart's rhythm, called an EKG  Your device records automatic EKGs, depending on how your caregiver programs it  You may also receive a handheld controller  You press a button on the controller when you have symptoms, such as dizziness, lightheadedness, or palpitations  The device will record an EKG at that moment  The recording can help your caregiver see if your symptoms may be caused by heart rhythm problems  Your caregiver will remove the device after it has collected enough data  You may need the device for up to 3 years  The procedure to remove the device is similar to the procedure used to implant it       AFTER YOU LEAVE:    Follow up with your cardiologist as directed: You will need to return in 1 to 2 weeks  Your cardiologist will check your incision  He may also program your device settings again  He will retrieve data from the device every 1 to 3 months with a monitor held over your skin  You may be able to transmit data from your device from home as well  You will do this by calling a number provided by your cardiologist, or as they have instructed you  Ask for information about this process  Write down your questions so you remember to ask them during your visits       Wound care: Keep loop recorder incision dry for one week  Do not use lotions/powders/creams on incision  Remove outer bandage 48 hours after procedure - leave underlying steri-strips in place, they will either fall off on their own or will be removed at 2 week follow up appointment  Please call the office if you notice redness, swelling, bleeding, or drainage from incision or if you develop fevers  After that first week, carefully wash your incision with soap and water  Keep the area clean and dry until it heals       Return to activity: If you received anesthesia, you will not be able to drive for 24 hours  Otherwise, most people can return to normal activities soon after the procedure  Your cardiologist may want to know if your work involves electrical current or high-voltage equipment  Ask about other electrical items that could interfere with your cardiac loop recorder       Contact your cardiologist if:   · You have a fever or chills  · Your wound is red, swollen, or draining pus  · You have questions or concerns about your condition or care  Seek care immediately or call 911 if:   · You feel weak, dizzy, or faint  · You lose consciousness  © 2014 [employer] is for End User's use only and may not be sold, redistributed or otherwise used for commercial purposes  All illustrations and images included in CareNotes® are the copyrighted property of A [employer]  or [employer]  The above information is an  only  It is not intended as medical advice for individual conditions or treatments  Talk to your doctor, nurse or pharmacist before following any medical regimen to see if it is safe and effective for you

## 2019-08-06 NOTE — INTERVAL H&P NOTE
NB is a pleasant 62year old female with dyslipidemia and pulmonary nodules  She was recent admitted to [address] [place] [address],[address] 7/2019 with right sided weakness  Work up revealed CVA that required tPA  She has residual area in MCA territory of ischemia  She was seen by cardiology as an outpatient on 7/23/2019 and it was recommended (also by neurology) that she undergo loop recorder implantation for arrhythmia monitoring  Today patient denies palpitations, racing heart, chest pain, shortness of breath, and lower extremity swelling  She does still have residual weakness in her right hand for which she is going to therapy  There were no vitals taken for this visit   - pending

## 2019-08-07 NOTE — OP NOTE
Procedure(s):  IMPLANTABLE LOOP RECORDER IMPLANTATION         Implantable Cardiac Loop Monitor Implantation     History:     Ms Chino Gutierrez is a 62 y o  woman with history of hyperlipidemia, pulmonary nodules who suffered stroke in July of 2019 in the MCA territory  She had a negative workup inpatient and outpatient  She is referred for loop placement to detect any atrial fibrillation  Procedure elements:     1  Implantation of cardiac monitor        Procedure Details:     Patient written consent was obtained following a complete risk/benefit discussion  A full time-out was conducted in the EP lab  The left parasternal area was sterilely prepped and draped  Local anesthetic was injected  The provided bladed tool was used to create a skin incision  The [employer] LINQ implantable monitor was inserted under the skin with the provided insertion tool (model [id number], serial # [id number] )  A sterile wand cover was used for immediate device interrogation, and good electrogram amplitude was ensured  Pressure was applied to the wound for hemostasis  The small incision was closed with for pole nonabsorbable sutures and dressed with Navi/Tegaderm dressing  Implantable monitor settings were programmed for the patient's cryptogenic stroke indication:     Atrial fibrillation detection:  ON  Pauses >3 seconds  Bradycardia <30 bpm for 4 beats  Tachycardia >167 bpm for 16 beats        Conclusion:       Successful implantable loop monitor insertion          Plan:     - remote monitoring was set up for routine monthly information downloads and interim detection of clinically relevant arrhythmic events  - if atrial fibrillation is detected, the plan would be to recommend an anticoagulant  - follow up with neurology s/p cerebral embolic event  - follow up with EP in outpatient clinic  - report if any redness, swelling, or drainage occurs from the device site  - keep wound dry for 1 week

## 2019-08-08 ENCOUNTER — OFFICE VISIT (OUTPATIENT)
Dept: PHYSICAL THERAPY | Facility: CLINIC | Age: 59
End: 2019-08-08
Payer: COMMERCIAL

## 2019-08-08 DIAGNOSIS — I63.512 ACUTE ISCHEMIC LEFT MIDDLE CEREBRAL ARTERY (MCA) STROKE (HCC): Primary | ICD-10-CM

## 2019-08-08 PROCEDURE — 97110 THERAPEUTIC EXERCISES: CPT

## 2019-08-08 NOTE — PROGRESS NOTES
Daily Note     Today's date: 2019  Patient name: Amber Gerardo  : 1960  MRN: [id number]  Referring provider: Wiliam Serrtao  Dx:   Encounter Diagnosis     ICD-10-CM    1  Acute ischemic left middle cerebral artery (MCA) stroke (HCC) I63 430                   Subjective: Pt  reports she had a defibrillator inserted  Pt  reports the Dr  told her to go about her daily activities  Pt  does note things are getting easier at home  She is " not dropping things as much "      Objective: See treatment diary below  Wrist/Hand Comments  AROM right hand -grossly WFL all ranges  Strength-  II- ; 3 MICHAEL- 8/10; Key-                  Dynamometer- biceps-R/L-35 @ 86%; Shoulder FE-  @81%  Sensation- intact to LT all digits  Coordination- impaired for FMS- assembly- R/L- @94%         Precautions: avoid unsafe lifting, activity for strength consideration          Assessment: Tolerated treatment well  Patient exhibited good technique with therapeutic exercises and would benefit from continued PT  RX modified this day secondary to surgery  Pt  improving with awareness of placement of IF for improved  and dexterity  Plan: Progress treatment as tolerated         Precautions: avoid unsafe lifting, activity for strength consideration    Manual    8         TP HEP        Y TP                                             Exercise Diary    8/5  8/8       TP  T 2' T 2' T 2'  T 2'  Y 2'  Y 2'  Y 2'  Y 2'       TP 5 finger T 2/10 T 2/10 T 2/10  T 2/10  Y  2/10  Y  2/10  Y 2/10  Y 2/10       TP key T 2/10 T 2/10 T 2/10  T 2/10  Y  2/10  Y  2/10  Y  2/10  Y  2/10       Jerry --- 10# 2/10 10# 2/10  25 2/10  25  2/10  25  2/10  25  2/10  25  2/10       Blue III 2/10 III 2/10 III 2/10  IV 2/10  IV  2/10  IV  2/10  IV  2/10  IV  2/10       twister 20/20 20/20 20/20 dc       20/20       DB E/F --- 2# 2/10 3# 2/10  4 2/10  4  /10  4  2/10  4  2/10  4  2/10            S/P --- Wand 2/10 Wand 2/10  1 2/10  1  2/10  1  2/10  1 5  2/10  1 5  2/10       Nibs T 14 T 14' T 14'  T 14  T 14  T 14  T 14  T 14       Zottman's --- 2# 2/10 3# 2/10  4 2/10  4  2/10  4  2/10  4  2/10  NP       FF/Scapt --- 2# 2/10 3# 2/10  4 2/10  4  2/10  4  2/10  4  2/10  NP                                                                                                       Flori --- R 13x L 22  Assembly R 4x L 8x Full assembly 2'  2'  2'  3'  3'  3'  16 rows R       Comp  XS 2' XS 2' Y 3'  Y 3'  Y 3'  Y 3'  Y 3'  NP        UBE        2/2  2/2  3/2  3/3  NP                                                             Modalities

## 2019-08-12 ENCOUNTER — OFFICE VISIT (OUTPATIENT)
Dept: PHYSICAL THERAPY | Facility: CLINIC | Age: 59
End: 2019-08-12
Payer: COMMERCIAL

## 2019-08-12 DIAGNOSIS — I63.512 ACUTE ISCHEMIC LEFT MIDDLE CEREBRAL ARTERY (MCA) STROKE (HCC): Primary | ICD-10-CM

## 2019-08-12 PROCEDURE — 97535 SELF CARE MNGMENT TRAINING: CPT

## 2019-08-12 PROCEDURE — 97110 THERAPEUTIC EXERCISES: CPT

## 2019-08-12 NOTE — PROGRESS NOTES
Daily Note     Today's date: 2019  Patient name: Mignon Hickey  : 1960  MRN: [id number]  Referring provider: Adrianne De Santiago  Dx:   Encounter Diagnosis     ICD-10-CM    1  Acute ischemic left middle cerebral artery (MCA) stroke ([facility]) U68 [id number]                   Subjective: Pt  reports she is doing more activities at home and not dropping as many things  Objective: See treatment diary below    Wrist/Hand Comments  AROM right hand -grossly WFL all ranges  Strength-  II- 37/45; 3 MICHAEL- ; Key-                  Dynamometer- biceps-R/L-30/35 @ 86%; Shoulder FE- 3038 @81%  Sensation- intact to LT all digits  Coordination- impaired for FMS- assembly- R/L- @94%         Precautions: avoid unsafe lifting, activity for strength consideration        Assessment: Tolerated treatment well  Patient exhibited good technique with therapeutic exercises and would benefit from continued PT  Pt  Improving with strength as seen above  Pt  with improved placement of index finger with TE  Resumed TE as pt  denies pain or soreness form insertion of defibbillator  Plan: Progress treatment as tolerated         Precautions: avoid unsafe lifting, activity for strength consideration    Manual         TP HEP        Y TP                                             Exercise Diary    8  8  8     TP  T 2' T 2' T 2'  T 2'  Y 2'  Y 2'  Y 2'  Y 2'  Y 3'     TP 5 finger T 2/10 T 2/10 T 2/10  T 2/10  Y  2/10  Y  2/10  Y 2/10  Y 2/10  Y  2/10     TP key T 2/10 T 2/10 T 2/10  T 2/10  Y  2/10  Y  2/10  Y  2/10  Y  2/10  Y  2/10     Jerry --- 10# 2/10 10# 2/10  25 2/10  25  2/10  25  2/10  25  2/10  25  2/10  25  2/10     Blue III 2/10 III 2/10 III 2/10  IV 2/10  IV  2/10  IV  2/10  IV  2/10  IV  2/10  V  2/10     twister 20/20 20/20 20/20 dc       20/20  DC     DB E/F --- 2# 2/10 3# 2/10  4 2/10  4  2/10  4  2/10  4  2/10  4  2/10  5  2/10          S/P --- Wand 2/10 Wand 2/10  1 2/10  1  2/10  1  2/10  1 5  2/10  1 5  2/10  1 5  2/10     Nibs T 14 T 14' T 14'  T 14  T 14  T 14  T 14  T 14  Y 16     Zottman's --- 2# 2/10 3# 2/10  4 2/10  4  2/10  4  2/10  4  2/10  NP  5  2/10     FF/Scapt --- 2# 2/10 3# 2/10  4 2/10  4  2/10  4  2/10  4  2/10  NP  5  2/10                                                                                                     Flori --- R 13x L 22  Assembly R 4x L 8x Full assembly 2'  2'  2'  3'  3'  3'  16 rows   3  22  rows'     Comp  XS 2' XS 2' Y 3'  Y 3'  Y 3'  Y 3'  Y 3'  NP  Y 3'      UBE        2/2  2/2  3/2  3/3  NP  3/3                                                           Modalities

## 2019-08-15 ENCOUNTER — OFFICE VISIT (OUTPATIENT)
Dept: PHYSICAL THERAPY | Facility: CLINIC | Age: 59
End: 2019-08-15
Payer: COMMERCIAL

## 2019-08-15 DIAGNOSIS — I63.512 ACUTE ISCHEMIC LEFT MIDDLE CEREBRAL ARTERY (MCA) STROKE (HCC): Primary | ICD-10-CM

## 2019-08-15 PROCEDURE — 97535 SELF CARE MNGMENT TRAINING: CPT

## 2019-08-15 PROCEDURE — 97110 THERAPEUTIC EXERCISES: CPT

## 2019-08-15 NOTE — PROGRESS NOTES
Daily Note     Today's date: 8/15/2019  Patient name: Emanuel Resendez  : 1960  MRN: [id number]  Referring provider: Harish Ha  Dx:   Encounter Diagnosis     ICD-10-CM    1  Acute ischemic left middle cerebral artery (MCA) stroke (HCC) V89 001                   Subjective: Pt reports she is getting stronger and better coordination of the R UE  Objective:Wrist/Hand Comments  AROM right hand -grossly WFL all ranges  Strength-  II- 37/45; 3 MICHAEL- ; Key-                  Dynamometer- biceps-R/L-30/35 @ 86%; Shoulder FE- 3038 @81%  Sensation- intact to LT all digits  Coordination- impaired for FMS- assembly- R/L- @94%    See treatment diary below      Assessment: Tolerated treatment well today  Pt with increased time on UBE with no increase symptoms or fatigue  Plan: Progress treatment as tolerated         Precautions: avoid unsafe lifting, activity for strength consideration    Manual  7/16 7/18 7/22  7/25  7/29  7/31  8/5  8/12  8/15     TP HEP        Y TP                                             Exercise Diary  7/17 7/18 7/22  7/25  7/29  7/31  8/5  8/8  8/12  8/15   TP  T 2' T 2' T 2'  T 2'  Y 2'  Y 2'  Y 2'  Y 2'  Y 3'  Y 3'   TP 5 finger T 2/10 T 2/10 T 2/10  T 2/10  Y  2/10  Y  2/10  Y 2/10  Y 2/10  Y  2/10  Y 2/10   TP key T 2/10 T 2/10 T 2/10  T 2/10  Y  2/10  Y  2/10  Y  2/10  Y  2/10  Y  2/10  Y 2/10   Jerry --- 10# 2/10 10# 2/10  25 2/10  25  2/10  25  2/10  25  2/10  25  2/10  25  2/10  25 2/10   Blue III 2/10 III 2/10 III 2/10  IV 2/10  IV  2/10  IV  2/10  IV  2/10  IV  2/10  V  2/10  V 2/10   twister 20/20 20/20 20/20 dc       20/20  DC     DB E/F --- 2# 2/10 3# 2/10  4 2/10  4  2/10  4  2/10  4  2/10  4  2/10  5  2/10  5 2/10        S/P --- Wand 2/10 Wand 2/10  1 2/10  1  2/10  1  2/10  1 5  2/10  1 5  2/10  1 5  2/10  1 5 2/10   Nibs T 14 T 14' T 14'  T 14  T 14  T 14  T 14  T 14  Y 16  Y 16x   Librattman's --- 2# 2/10 3# 2/10  4 2/10  4  2/10  4  2/10  4  2/10  NP  5  2/10  5 2/10   FF/Scapt --- 2# 2/10 3# 2/10  4 2/10  4  2/10  4  2/10  4  2/10  NP  5  2/10  5 2/10                                                                                                   Flori --- R 13x L 22  Assembly R 4x L 8x Full assembly 2'  2'  2'  3'  3'  3'  16 rows   3  22  rows'  3' 21x   Comp  XS 2' XS 2' Y 3'  Y 3'  Y 3'  Y 3'  Y 3'  NP  Y 3'  Y 3'    UBE        2/2  2/2  3/2  3/3  NP  3/3  4/4                                                         Modalities

## 2019-08-19 ENCOUNTER — OFFICE VISIT (OUTPATIENT)
Dept: PHYSICAL THERAPY | Facility: CLINIC | Age: 59
End: 2019-08-19
Payer: COMMERCIAL

## 2019-08-19 DIAGNOSIS — I63.512 ACUTE ISCHEMIC LEFT MIDDLE CEREBRAL ARTERY (MCA) STROKE (HCC): Primary | ICD-10-CM

## 2019-08-19 PROCEDURE — 97110 THERAPEUTIC EXERCISES: CPT | Performed by: PHYSICAL THERAPIST

## 2019-08-19 NOTE — PROGRESS NOTES
PT Re-Evaluation     Today's date: 2019  Patient name: Nik Man  : 1960  MRN: [id number]  Referring provider: Lili Steiner  Dx:   Encounter Diagnosis     ICD-10-CM    1  Acute ischemic left middle cerebral artery (MCA) stroke [facility]) [id number]                   Assessment  Assessment details: Pt is a 60 YO female presenting to PT with pain, decreased AROM, strength and tolerance to activity  Pt would benefit from skilled intervention to address these issues and maximize overall function  Occupation-  at [employer] with this injury  Dominant- Right; Involved-Right  Pt has noted increase in strength and dexterity in her right hand and UE  She is completing more tasks at home without assistance  She cont to avoid heavy lifting  Goals  ST  Instruct in safety with loss of strength considerations            2  Increase strength to complete ADL and self care  LT  Increase functional motion and strength for independence with ADL and self care by DC            2  Ability to RTW and recreational activity by DC    Plan  Patient would benefit from: skilled physical therapy  Planned therapy interventions: manual therapy, therapeutic exercise and home exercise program  Frequency: 2x week  Duration in weeks: 4  Treatment plan discussed with: family        Subjective Evaluation    History of Present Illness  Date of onset: 7/10/2019  Mechanism of injury: Insidious onset with sudden loss of strength, coordination in her Right U and LE   LE has recovered for independent function    RUE with loss of full coordination and strength  Pain  Current pain ratin  At best pain ratin  At worst pain ratin    Social Support  Lives with: spouse    Employment status: not working  Hand dominance: right    Treatments  Current treatment: physical therapy  Patient Goals  Patient goals for therapy: increased strength, independence with ADLs/IADLs, return to sport/leisure activities and return to work          Objective     General Comments:      Wrist/Hand Comments  AROM right hand -grossly WFL all ranges  Strength-  II- 37/45; 3 MICHAEL- 9/11; Key-9/11                  Dynamometer- biceps-R/L-30/35 @ 86%;  Shoulder FE- 30/38 @81%  Sensation- intact to LT all digits  Coordination- impaired for FMS-3 minute  assembly- R/L-21/27 @78%         Precautions: avoid unsafe lifting, activity for strength consideration    Manual  7/16 7/18 7/22  7/25  7/29  7/31  8/5  8/12  8/15     TP HEP        Y TP                                             Exercise Diary  8/19            TP  Y 2' T 2' T 2'  T 2'  Y 2'  Y 2'  Y 2'  Y 2'  Y 3'  Y 3'   TP 5 finger Y 2/10 T 2/10 T 2/10  T 2/10  Y  2/10  Y  2/10  Y 2/10  Y 2/10  Y  2/10  Y 2/10   TP key Y 2/10 T 2/10 T 2/10  T 2/10  Y  2/10  Y  2/10  Y  2/10  Y  2/10  Y  2/10  Y 2/10   Jerry 25 2/10 10# 2/10 10# 2/10  25 2/10  25  2/10  25  2/10  25  2/10  25  2/10  25  2/10  25 2/10   Blue V 2/10 III 2/10 III 2/10  IV 2/10  IV  2/10  IV  2/10  IV  2/10  IV  2/10  V  2/10  V 2/10                 DB E/F 5 2/10 2# 2/10 3# 2/10  4 2/10  4  2/10  4  2/10  4  2/10  4  2/10  5  2/10  5 2/10        S/P 1 5 2/10 Wand 2/10 Wand 2/10  1 2/10  1  2/10  1  2/10  1 5  2/10  1 5  2/10  1 5  2/10  1 5 2/10   Nibs Y 14 T 14' T 14'  T 14  T 14  T 14  T 14  T 14  Y 16  Y 16x   Zottman's 5 2/10 2# 2/10 3# 2/10  4 2/10  4  2/10  4  2/10  4  2/10  NP  5  2/10  5 2/10   FF/Scapt 5 2/10 2# 2/10 3# 2/10  4 2/10  4  2/10  4  2/10  4  2/10  NP  5  2/10  5 2/10                                                                                                   Flori Fullassembly  3'--R- 21 R 13x L 22  Assembly R 4x L 8x Full assembly 2'  2'  2'  3'  3'  3'  16 rows   3  22  rows'  3' 21x   Comp  Y 3' XS 2' Y 3'  Y 3'  Y 3'  Y 3'  Y 3'  NP  Y 3'  Y 3'    UBE  4/4      2/2  2/2  3/2  3/3  NP  3/3  4/4                                                         Modalities

## 2019-08-21 ENCOUNTER — OFFICE VISIT (OUTPATIENT)
Dept: PHYSICAL THERAPY | Facility: CLINIC | Age: 59
End: 2019-08-21
Payer: COMMERCIAL

## 2019-08-21 DIAGNOSIS — I63.512 ACUTE ISCHEMIC LEFT MIDDLE CEREBRAL ARTERY (MCA) STROKE (HCC): Primary | ICD-10-CM

## 2019-08-21 PROCEDURE — 97110 THERAPEUTIC EXERCISES: CPT

## 2019-08-21 NOTE — PROGRESS NOTES
Daily Note     Today's date: 2019  Patient name: Kali Salgado  : 1960  MRN: [id number]  Referring provider: Dwain Mead  Dx:   Encounter Diagnosis     ICD-10-CM    1  Acute ischemic left middle cerebral artery (MCA) stroke (HCC) Z01 [id number]                   Subjective: Pt  reports she is going to the cardiologist and neurologist on Friday  Pt  thinks they may send her back to work  Pt  will call to R/S following her appointments  Objective: See treatment diary below    Wrist/Hand Comments  AROM right hand -grossly WFL all ranges  Strength-  II- 37/45; 3 MICHAEL- ; Key-                  Dynamometer- biceps-R/L-3035 @ 86%; Shoulder FE- 30 @81%  Sensation- intact to LT all digits  Coordination- impaired for FMS-3 minute  assembly- R/L- @78%         Precautions: avoid unsafe lifting, activity for strength consideration      Assessment: Tolerated treatment well  Patient exhibited good technique with therapeutic exercises and would benefit from continued PT  Pt  scored well with 3' Flori assembly test OCC VC for proper placement of IF with TE  Plan: Progress treatment as tolerated         Precautions: avoid unsafe lifting, activity for strength consideration    Manual  7/16 7/18 7/22  7/25  7/29  7/31  8/5  8/12  8/15     TP HEP        Y TP                                             Exercise Diary             TP  Y 2' Y 2' T 2'  T 2'  Y 2'  Y 2'  Y 2'  Y 2'  Y 3'  Y 3'   TP 5 finger Y 2/10 Y 2/10 T 2/10  T 2/10  Y  2/10  Y  2/10  Y 2/10  Y 2/10  Y  2/10  Y 2/10   TP key Y 2/10 Y 2/10 T 2/10  T 2/10  Y  2/10  Y  2/10  Y  2/10  Y  2/10  Y  2/10  Y 2/10   Jerry 25 2/10 25# 2/10 10# 2/10  25 2/10  25  2/10  25  2/10  25  2/10  25  2/10  25  2/10  25 2/10   Blue V 2/10 V 2/10 III 2/10  IV 2/10  IV  2/10  IV  2/10  IV  2/10  IV  2/10  V  2/10  V 2/10                 DB E/F 5 2/10 5# 2/10 3# 2/10  4 2/10  4  2/10  4  2/10  4  2/10  4  2/10  5  2/10  5 2/10      S/P 1 5 2/10 1 5 2/10 Wand 2/10  1 2/10  1  2/10  1  2/10  1 5  2/10  1 5  2/10  1 5  2/10  1 5 2/10   Nibs Y 15 Y 16' T 14'  T 14  T 14  T 14  T 14  T 14  Y 16  Y 16x   Zottman's 5 2/10 5# 2/10 3# 2/10  4 2/10  4  2/10  4  2/10  4  2/10  NP  5  2/10  5 2/10   FF/Scapt 5 2/10 5# 2/10 3# 2/10  4 2/10  4  2/10  4  2/10  4  2/10  NP  5  2/10  5 2/10                                                                                                   Flori Fullassembly  3'--R- 21 3' R 20 5, L 21    Full assembly 2'  2'  2'  3'  3'  3'  16 rows   3  22  rows'  3' 21x   Comp  Y 3' Y 2' Y 3'  Y 3'  Y 3'  Y 3'  Y 3'  NP  Y 3'  Y 3'    UBE  4/4  4/4    2/2  2/2  3/2  3/3  NP  3/3  4/4                                                         Modalities

## 2019-08-22 ENCOUNTER — IN-CLINIC DEVICE VISIT (OUTPATIENT)
Dept: CARDIOLOGY CLINIC | Facility: HOSPITAL | Age: 59
End: 2019-08-22
Payer: COMMERCIAL

## 2019-08-22 ENCOUNTER — OFFICE VISIT (OUTPATIENT)
Dept: NEUROLOGY | Facility: CLINIC | Age: 59
End: 2019-08-22
Payer: COMMERCIAL

## 2019-08-22 VITALS
DIASTOLIC BLOOD PRESSURE: 72 MMHG | SYSTOLIC BLOOD PRESSURE: 114 MMHG | WEIGHT: 144 LBS | HEIGHT: 61 IN | BODY MASS INDEX: 27.19 KG/M2 | HEART RATE: 78 BPM

## 2019-08-22 DIAGNOSIS — Z95.818 PRESENCE OF OTHER CARDIAC IMPLANTS AND GRAFTS: ICD-10-CM

## 2019-08-22 DIAGNOSIS — I63.9 STROKE ABORTED BY ADMINISTRATION OF THROMBOLYTIC AGENT (HCC): ICD-10-CM

## 2019-08-22 DIAGNOSIS — I63.9 CRYPTOGENIC STROKE (HCC): Primary | ICD-10-CM

## 2019-08-22 DIAGNOSIS — I63.512 ACUTE ISCHEMIC LEFT MIDDLE CEREBRAL ARTERY (MCA) STROKE (HCC): Primary | ICD-10-CM

## 2019-08-22 DIAGNOSIS — E78.49 OTHER HYPERLIPIDEMIA: ICD-10-CM

## 2019-08-22 PROCEDURE — 99215 OFFICE O/P EST HI 40 MIN: CPT | Performed by: PSYCHIATRY & NEUROLOGY

## 2019-08-22 PROCEDURE — 93291 INTERROG DEV EVAL SCRMS IP: CPT | Performed by: INTERNAL MEDICINE

## 2019-08-22 NOTE — PROGRESS NOTES
Results for orders placed or performed in visit on 08/22/19   Cardiac EP device report    Narrative    MDT-LOOP RECORDER  DEVICE INTERROGATED IN THE [facility] OFFICE LOOP: WOUND CHECK: INCISION CLEAN AND DRY WITH EDGES APPROXIMATED; SUTURES REMOVED; WOUND CARE AND RESTRICTIONS REVIEWED WITH PATIENT  BATTERY VOLTAGE "OK"  PRESENTING RHYTHM: NSR @ 80 BPM   R-WAVES: 0 88mV  NO PATIENT OR DEVICE ACTIVATED EPISODES  NO PROGRAMMING CHANGES MADE TO DEVICE PARAMETERS  NORMAL DEVICE FUNCTION    [address] [place] [address]

## 2019-08-22 NOTE — PROGRESS NOTES
Patient ID: Leisa Mills is a 62 y o  female  Assessment/Plan:    63 y/o Female who is here as a hospital follow up  Patient had a L MCA stroke, etiology was unclear, cannot rule out cardioembolic etiology  CTA head and neck which I personally reviewed the images of was negative for any LVO/stenosis  Patient does have risk factors such as obesity, hyperlipidemia  Patient had no TIA/CVA like symptoms  PLAN:  -c/w with combination of aspirin and lovastatin for secondary stroke prevention  -Continue with BP goal < 130/80, BP is at goal currently    -Continue to monitor DM and appropriate Euglycemic control  -Defer to PCP regarding DM and BP management  -does not smoke  -denies any history of snoring    -continue hand therapy for now    -I advised patient to avoid using NSAIDs for headaches or other pain and instead just stick to tylenol    -I educated regarding mediterranean style diet and regular exercise regimen of brisk walking atleast 4-5 times a week  Literature given  -I educated patient and family regarding medication compliance and stroke risk factor modifications  pateint wants to go back to work and I recommended light duty no more than 20hrs a week for 3-4 weeks, and then if she can tolerate that, then can increase to 40hrs per week  Problem List Items Addressed This Visit        Cardiovascular and Mediastinum    Acute ischemic left middle cerebral artery (MCA) stroke (HCC) - Primary       Other    Stroke aborted by administration of thrombolytic agent [facility])             Subjective:    HPI    This is a 63 y/o  Female who is here as a hospital follow up  Patient presented to the hospital in July 2019 which had right sided weakness when she presented to the ER she was a stroke alert, and she was given TPA  Patient had CT head without contrast which was negative for any acute findings  She had CTA head and neck which was negative for any LVO/stenosis   She was admitted and she had Mri brain which I personally reviewed the images of which showed L MCA stroke  She was not on aspirin prior to this  She was started on aspirin/plavix for 21 days and then switched to aspirin alone  Etiology of the stroke is unclear, wanted to rule out cardioembolic etiology as a possibility  Patient had a loop recorder placed three weeks ago  Patient is doing well overall  She denies any new TIA/CVA like symptoms  Patient is currently getting hand therapy  She says that she has made a lot of progress in terms of hand weakness  She initially could not write, and she says now she can write better  She wants to go back to work because she is worried about not getting paid soon  The following portions of the patient's history were reviewed and updated as appropriate:   She  has a past medical history of Bladder stone, Hyperlipidemia, and Ureteral calculus, right  She   Patient Active Problem List    Diagnosis Date Noted    Stroke aborted by administration of thrombolytic agent ([facility] [address] ) 07/11/2019    Acute ischemic left middle cerebral artery (MCA) stroke (HCC)     Age-related osteoporosis without current pathological fracture 10/09/2018    Seasonal allergic rhinitis due to pollen 05/14/2018    Lesion of nose 05/14/2018    Increased BMI 11/06/2017    Generalized anxiety disorder 08/10/2012    Other hyperlipidemia 07/06/2012     She  has a past surgical history that includes Gallbladder surgery  Her family history includes Cancer in her maternal grandmother; Stroke in her mother  She  reports that she has never smoked  She has never used smokeless tobacco  She reports that she drinks alcohol  She reports that she does not use drugs    Current Outpatient Medications   Medication Sig Dispense Refill    ascorbic acid (VITAMIN C) 500 mg tablet Take 500 mg by mouth daily      aspirin (ECOTRIN LOW STRENGTH) 81 mg EC tablet Take 1 tablet (81 mg total) by mouth daily 90 tablet 0    CRANBERRY PO Take 168 mg by mouth      Dextromethorphan-guaiFENesin (MUCINEX DM PO) Take by mouth      Lactobacillus (ACIDOPHILUS PO) Take by mouth      lovastatin (MEVACOR) 40 MG tablet Take 1 tablet by mouth once daily 90 tablet 3    Multiple Vitamins-Minerals (CENTRUM PO) Take by mouth      omeprazole (PriLOSEC) 20 mg delayed release capsule Take 20 mg by mouth daily      VITAMIN E PO Take 184 mg by mouth      clopidogrel (PLAVIX) 75 mg tablet Take 1 tablet (75 mg total) by mouth daily for 20 doses 20 tablet 0     No current facility-administered medications for this visit  Current Outpatient Medications on File Prior to Visit   Medication Sig    ascorbic acid (VITAMIN C) 500 mg tablet Take 500 mg by mouth daily    aspirin (ECOTRIN LOW STRENGTH) 81 mg EC tablet Take 1 tablet (81 mg total) by mouth daily    CRANBERRY PO Take 168 mg by mouth    Dextromethorphan-guaiFENesin (MUCINEX DM PO) Take by mouth    Lactobacillus (ACIDOPHILUS PO) Take by mouth    lovastatin (MEVACOR) 40 MG tablet Take 1 tablet by mouth once daily    Multiple Vitamins-Minerals (CENTRUM PO) Take by mouth    omeprazole (PriLOSEC) 20 mg delayed release capsule Take 20 mg by mouth daily    VITAMIN E PO Take 184 mg by mouth    clopidogrel (PLAVIX) 75 mg tablet Take 1 tablet (75 mg total) by mouth daily for 20 doses     No current facility-administered medications on file prior to visit  She is allergic to penicillins            Objective:    Blood pressure 114/72, pulse 78, height 5' 1" (1 549 m), weight 65 3 kg (144 lb), not currently breastfeeding  Physical Exam  General - Patient is alert, awake, follows commands  Speech - no dysarthria noted, no aphasia noted  Neuro:   Cranial nerves: PERRL, EOMI, no facial droop noted, facial sensation intact, tongue midline  Motor: mild distal weakness noted in R hand but no weakness anywhere else     Sensory - intact to soft touch throughout  Coordination - no ataxia/dysmetria noted  Gait - normal    ROS:  I personally reviewed ROS  Review of Systems   Constitutional: Negative  Negative for appetite change and fever  HENT: Negative  Negative for hearing loss, tinnitus, trouble swallowing and voice change  Eyes: Negative  Negative for photophobia and pain  Respiratory: Negative  Negative for shortness of breath  Cardiovascular: Negative  Negative for palpitations  Gastrointestinal: Negative  Negative for nausea and vomiting  Endocrine: Negative  Negative for cold intolerance and heat intolerance  Genitourinary: Negative  Negative for dysuria, frequency and urgency  Musculoskeletal: Negative  Negative for myalgias and neck pain  Skin: Negative  Negative for rash  Neurological: Negative  Negative for dizziness, tremors, seizures, syncope, facial asymmetry, speech difficulty, weakness, light-headedness, numbness and headaches  Hematological: Negative  Does not bruise/bleed easily  Psychiatric/Behavioral: Negative  Negative for confusion, hallucinations and sleep disturbance

## 2019-08-23 ENCOUNTER — OFFICE VISIT (OUTPATIENT)
Dept: FAMILY MEDICINE CLINIC | Facility: CLINIC | Age: 59
End: 2019-08-23
Payer: COMMERCIAL

## 2019-08-23 ENCOUNTER — TELEPHONE (OUTPATIENT)
Dept: FAMILY MEDICINE CLINIC | Facility: CLINIC | Age: 59
End: 2019-08-23

## 2019-08-23 VITALS
WEIGHT: 147.6 LBS | HEIGHT: 61 IN | DIASTOLIC BLOOD PRESSURE: 72 MMHG | SYSTOLIC BLOOD PRESSURE: 122 MMHG | BODY MASS INDEX: 27.87 KG/M2

## 2019-08-23 DIAGNOSIS — I63.512 ACUTE ISCHEMIC LEFT MIDDLE CEREBRAL ARTERY (MCA) STROKE (HCC): Primary | ICD-10-CM

## 2019-08-23 PROCEDURE — 1036F TOBACCO NON-USER: CPT | Performed by: FAMILY MEDICINE

## 2019-08-23 PROCEDURE — 3008F BODY MASS INDEX DOCD: CPT | Performed by: FAMILY MEDICINE

## 2019-08-23 PROCEDURE — 99213 OFFICE O/P EST LOW 20 MIN: CPT | Performed by: FAMILY MEDICINE

## 2019-08-23 NOTE — TELEPHONE ENCOUNTER
PT GAVE HER NOTE TO WORK, THEY WILL NOT LET HER BACK WITH 10 LB RESTRICTIONS, SHE MUST BE ABLE TO LIFT MORE  SHE IS GOING TO APPLY FOR DISABILITY  SHE WANTED TO MAKE YOU AWARE FOR WHEN PAPERWORK COMES IN

## 2019-08-23 NOTE — LETTER
August 23, 2019     Patient: Christiana Armando   YOB: 1960   Date of Visit: 8/23/2019       To Whom it May Concern:    Adriane Hannon is under my professional care  She was seen in my office on 8/23/2019  She may return to work with limitations on 8/28/2019  May only work up to 35 hours/ week, no more than 5 hours/ day  Can not lift greater than 10 lbs  These restrictions will be until seen again in 1 month  If you have any questions or concerns, please don't hesitate to call           Sincerely,          Sha Kowalski DO        CC: No Recipients

## 2019-08-23 NOTE — PROGRESS NOTES
Assessment/Plan:  Patient may return to work on 08/28  We will limit her to only working 35 hours per week, no more than 5 hours per day  No lifting greater than 10 lb  We will see her back in 1 month or p r n     Problem List Items Addressed This Visit        Cardiovascular and Mediastinum    Acute ischemic left middle cerebral artery (MCA) stroke (HCC) - Primary           Diagnoses and all orders for this visit:    Acute ischemic left middle cerebral artery (MCA) stroke (HCC)        No problem-specific Assessment & Plan notes found for this encounter  Subjective:      Patient ID: Nik Man is a 62 y o  female  Patient here today for follow-up  Patient denies any chest pain or shortness of breath  Patient still recovering from her CVA  She is getting more strength in her right hand  She would like to return to work, with limited duty  She does continue physical therapy  The following portions of the patient's history were reviewed and updated as appropriate:   She has a past medical history of Bladder stone, Hyperlipidemia, and Ureteral calculus, right ,  does not have any pertinent problems on file  ,   has a past surgical history that includes Gallbladder surgery  ,  family history includes Cancer in her maternal grandmother; Stroke in her mother  ,   reports that she has never smoked  She has never used smokeless tobacco  She reports that she drinks alcohol  She reports that she does not use drugs  ,  is allergic to penicillins     Current Outpatient Medications   Medication Sig Dispense Refill    ascorbic acid (VITAMIN C) 500 mg tablet Take 500 mg by mouth daily      aspirin (ECOTRIN LOW STRENGTH) 81 mg EC tablet Take 1 tablet (81 mg total) by mouth daily 90 tablet 0    CRANBERRY PO Take 168 mg by mouth      Dextromethorphan-guaiFENesin (MUCINEX DM PO) Take by mouth      Lactobacillus (ACIDOPHILUS PO) Take by mouth      lovastatin (MEVACOR) 40 MG tablet Take 1 tablet by mouth once daily 90 tablet 3    Multiple Vitamins-Minerals (CENTRUM PO) Take by mouth      omeprazole (PriLOSEC) 20 mg delayed release capsule Take 20 mg by mouth daily      VITAMIN E PO Take 184 mg by mouth      clopidogrel (PLAVIX) 75 mg tablet Take 1 tablet (75 mg total) by mouth daily for 20 doses (Patient not taking: Reported on 8/23/2019) 20 tablet 0     No current facility-administered medications for this visit  Review of Systems   Constitutional: Negative  Respiratory: Negative  Cardiovascular: Negative  Gastrointestinal: Negative  Genitourinary: Negative  Objective:  Vitals:    08/23/19 0917   BP: 122/72   Weight: 67 kg (147 lb 9 6 oz)   Height: 5' 1" (1 549 m)     Body mass index is 27 89 kg/m²  Physical Exam   Constitutional: She is oriented to person, place, and time  She appears well-developed and well-nourished  No distress  HENT:   Head: Normocephalic and atraumatic  Eyes: Conjunctivae are normal    Cardiovascular: Normal rate, regular rhythm and normal heart sounds  Exam reveals no gallop and no friction rub  No murmur heard  Pulmonary/Chest: Effort normal and breath sounds normal  No respiratory distress  She has no wheezes  She has no rales  Musculoskeletal: She exhibits no edema  Neurological: She is alert and oriented to person, place, and time  Skin: She is not diaphoretic  Psychiatric: She has a normal mood and affect  Her behavior is normal  Judgment and thought content normal    Vitals reviewed

## 2019-08-26 ENCOUNTER — OFFICE VISIT (OUTPATIENT)
Dept: PHYSICAL THERAPY | Facility: CLINIC | Age: 59
End: 2019-08-26
Payer: COMMERCIAL

## 2019-08-26 DIAGNOSIS — I63.512 ACUTE ISCHEMIC LEFT MIDDLE CEREBRAL ARTERY (MCA) STROKE (HCC): Primary | ICD-10-CM

## 2019-08-26 PROCEDURE — 97530 THERAPEUTIC ACTIVITIES: CPT

## 2019-08-26 PROCEDURE — 97110 THERAPEUTIC EXERCISES: CPT

## 2019-08-26 NOTE — PROGRESS NOTES
Daily Note     Today's date: 2019  Patient name: Nik Man  : 1960  MRN: [id number]  Referring provider: Lili Steiner  Dx:   Encounter Diagnosis     ICD-10-CM    1  Acute ischemic left middle cerebral artery (MCA) stroke ([facility]) V80 258                   Subjective: Pt  reports the neurologist said there is no way she can return to work at this time  Pt  reports she remains on a 10# weight restriction, and has to lift up to 40# trays at work  Objective: See treatment diary below    Wrist/Hand Comments  AROM right hand -grossly WFL all ranges  Strength-  II- 37/45; 3 MICHAEL- ; Key-                  Dynamometer- biceps-R/L-30/35 @ 86%; Shoulder FE- 30/38 @81%  Sensation- intact to LT all digits  Coordination- impaired for FMS-3 minute  assembly- R/L- @78%         Precautions: avoid unsafe lifting, activity for strength consideration      Assessment: Tolerated treatment well  Patient exhibited good technique with therapeutic exercises and would benefit from continued PT  Added a crate lift as well as the nu-step to POC for improved functional activity  Added writing practice for fine motor skills  Plan: Progress treatment as tolerated         Precautions: avoid unsafe lifting, activity for strength consideration    Manual  7/16 7/18 7/22  7/25  7/29  7/31  8/5  8/12  8/15  8/26   TP HEP        Y TP                                             Exercise Diary            TP  Y 2' Y 2' Y 2'  T 2'  Y 2'  Y 2'  Y 2'  Y 2'  Y 3'  Y 3'   TP 5 finger Y 2/10 Y 2/10 Y 2/10  T 2/10  Y  2/10  Y  2/10  Y 2/10  Y 2/10  Y  2/10  Y 2/10   TP key Y 2/10 Y 2/10 Y2/10  T 2/10  Y  2/10  Y  2/10  Y  2/10  Y  2/10  Y  2/10  Y 2/10   Jerry 25 2/10 25# 2/10 25# 2/10  25 2/10  25  2/10  25  2/10  25  2/10  25  2/10  25  2/10  25 2/10   Blue V 2/10 V 2/10 V  2/10  IV 2/10  IV  2/10  IV  2/10  IV  2/10  IV  2/10  V  2/10  V 2/10                 DB E/F 5 2/10 5# 2/10 5# 2/10  4 2/10  4  2/10  4  2/10  4  2/10  4  2/10  5  2/10  5 2/10        S/P 1 5 2/10 1 5 2/10 1 5 2/10  1 2/10  1  2/10  1  2/10  1 5  2/10  1 5  2/10  1 5  2/10  1 5 2/10   Nibs Y 14 Y 16' Y 14'  T 14  T 14  T 14  T 14  T 14  Y 16  Y 16x   Zottman's 5 2/10 5# 2/10 5# 2/10  4 2/10  4  2/10  4  2/10  4  2/10  NP  5  2/10  5 2/10   FF/Scapt 5 2/10 5# 2/10 5# 2/10  4 2/10  4  2/10  4  2/10  4  2/10  NP  5  2/10  5 2/10                           Crate Lift     Chair to Table       Table to Table      5#  10x each                 Writing   Practice      1x                                         Flori Fullassembly  3'--R- 21 3' R 20 5, L 21    NP  2'  2'  3'  3'  3'  16 rows   3  22  rows'  3' 21x   Comp  Y 3' Y 2' Y 3'  Y 3'  Y 3'  Y 3'  Y 3'  NP  Y 3'  Y 3'    UBE  4/4  4/4  4/4  2/2  2/2  3/2  3/3  NP  3/3  4/4    Nu-step     L 3 5'                                               Modalities

## 2019-08-29 ENCOUNTER — OFFICE VISIT (OUTPATIENT)
Dept: PHYSICAL THERAPY | Facility: CLINIC | Age: 59
End: 2019-08-29
Payer: COMMERCIAL

## 2019-08-29 DIAGNOSIS — I63.512 ACUTE ISCHEMIC LEFT MIDDLE CEREBRAL ARTERY (MCA) STROKE (HCC): Primary | ICD-10-CM

## 2019-08-29 DIAGNOSIS — Z12.31 ENCOUNTER FOR SCREENING MAMMOGRAM FOR MALIGNANT NEOPLASM OF BREAST: Primary | ICD-10-CM

## 2019-08-29 DIAGNOSIS — E78.49 OTHER HYPERLIPIDEMIA: ICD-10-CM

## 2019-08-29 PROCEDURE — 97530 THERAPEUTIC ACTIVITIES: CPT

## 2019-08-29 PROCEDURE — 97110 THERAPEUTIC EXERCISES: CPT

## 2019-08-29 NOTE — PROGRESS NOTES
Daily Note     Today's date: 2019  Patient name: Mira Barreto  : 1960  MRN: [id number]  Referring provider: Yecenia Wills  Dx:   Encounter Diagnosis     ICD-10-CM    1  Acute ischemic left middle cerebral artery (MCA) stroke ([facility]) Y66 [id number]                   Subjective: Pt reports she felt really fatigued after last session  Pt does report being more active with her hand  Objective: Wrist/Hand Comments  AROM right hand -grossly WFL all ranges  Strength-  II- 32/40; 3 MICHAEL- 9/10; Key-10/10                  Dynamometer- biceps-R/L-30/35 @ 86%; Shoulder FE- 30/38 @81%  Sensation- intact to LT all digits  Coordination- impaired for FMS-3 minute  assembly- R/L- @78%   See treatment diary below      Assessment: Tolerated treatment well today  Pt continues to progress without complaints  Pt does display some fatigue and needs cueing for proper execution of exercises  Plan: Progress treatment as tolerated         Precautions: avoid unsafe lifting, activity for strength consideration    Manual  7/16 7/18 7/22  7/25  7/29  7/31  8/5  8/12  8/15  8/26   TP HEP        Y TP                                             Exercise Diary           TP  Y 2' Y 2' Y 2'  Y 2'  Y 2'  Y 2'  Y 2'  Y 2'  Y 3'  Y 3'   TP 5 finger Y 2/10 Y 2/10 Y 2/10  Y 2/10  Y  2/10  Y  2/10  Y 2/10  Y 2/10  Y  2/10  Y 2/10   TP key Y 2/10 Y 2/10 Y2/10  Y 2/10  Y  2/10  Y  2/10  Y  2/10  Y  2/10  Y  2/10  Y 2/10   Jerry 25 2/10 25# 2/10 25# 2/10  25 2/10  25  2/10  25  2/10  25  2/10  25  2/10  25  2/10  25 2/10   Blue V 2/10 V 2/10 V  2/10  V 2/10  IV  2/10  IV  2/10  IV  2/10  IV  2/10  V  2/10  V 2/10                 DB E/F 5 2/10 5# 2/10 5# 2/10 5# 2/10  4  2/10  4  2/10  4  2/10  4  2/10  5  2/10  5 2/10        S/P 1 5 2/10 1 5 2/10 1 5 2/10 1 5# 2/10   1  2/10  1  2/10  1 5  2/10  1 5  2/10  1 5  2/10  1 5 2/10   Nibs Y 14 Y 16' Y 14'  Y 14  T 14  T 14  T 14  T 14  Y 16  Y 16x   Jhon's 5 2/10 5# 2/10 5# 2/10 5# 2/10  4  2/10  4  2/10  4  2/10  NP  5  2/10  5 2/10   FF/Scapt 5 2/10 5# 2/10 5# 2/10 5# 2/10  4  2/10  4  2/10  4  2/10  NP  5  2/10  5 2/10                           Crate Lift     Chair to Table       Table to Table      5#  10x each  NV               Writing   Practice      1x  1x                                       Flori Fullassembly  3'--R- 21 3' R 20 5, L 21    NP  2' 19x  2'  3'  3'  3'  16 rows   3  22  rows'  3' 21x   Comp  Y 3' Y 2' Y 3'  Y 3'  Y 3'  Y 3'  Y 3'  NP  Y 3'  Y 3'    UBE  4/4  4/4  4/4 5/5  2/2  3/2  3/3  NP  3/3  4/4    Nu-step     L 3 5' L3 5'                                             Modalities

## 2019-08-30 ENCOUNTER — TELEPHONE (OUTPATIENT)
Dept: NEUROLOGY | Facility: CLINIC | Age: 59
End: 2019-08-30

## 2019-08-30 NOTE — TELEPHONE ENCOUNTER
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

## 2019-09-03 ENCOUNTER — OFFICE VISIT (OUTPATIENT)
Dept: PHYSICAL THERAPY | Facility: CLINIC | Age: 59
End: 2019-09-03
Payer: COMMERCIAL

## 2019-09-03 DIAGNOSIS — E78.49 OTHER HYPERLIPIDEMIA: ICD-10-CM

## 2019-09-03 DIAGNOSIS — I63.512 ACUTE ISCHEMIC LEFT MIDDLE CEREBRAL ARTERY (MCA) STROKE (HCC): Primary | ICD-10-CM

## 2019-09-03 PROCEDURE — 97110 THERAPEUTIC EXERCISES: CPT

## 2019-09-03 PROCEDURE — 97530 THERAPEUTIC ACTIVITIES: CPT

## 2019-09-03 NOTE — PROGRESS NOTES
Daily Note     Today's date: 9/3/2019  Patient name: Pelon Varner  : 1960  MRN: [id number]  Referring provider: Elena Hutchins  Dx:   Encounter Diagnosis     ICD-10-CM    1  Acute ischemic left middle cerebral artery (MCA) stroke (HCC) I63 512    2  Other hyperlipidemia E78 49                   Subjective: Pt  reports her hand "really ached" yesterday, but is feeling better today  Objective: See treatment diary below  AROM right hand -grossly WFL all ranges  Strength-  II- 32/40; 3 MICHAEL- 910; Key-10/10                  Dynamometer- biceps-R/L-30/35 @ 86%; Shoulder FE- 30/38 @81%  Sensation- intact to LT all digits  Coordination- impaired for FMS-3 minute  assembly- R/L- @78%   See treatment diary below        Assessment: Tolerated treatment well  Patient would benefit from continued PT  Pt  Able to complete box lift from floor<>chair with VC for proper body mechanics  Pt  able to increase time on Nu-Step  Added tabletop stretch to improve finger extension  Pt  Remains on 10# weight restriction  Plan: Progress treatment as tolerated         Precautions: avoid unsafe lifting, activity for strength consideration    Manual  9/3 7/18 7/22  7/25  7/29  7/31  8/5  8/12  8/15  8/26   TP HEP        Y TP                                             Exercise Diary  8/19 8/21 8/26 8/29 9/3        TP  Y 2' Y 2' Y 2'  Y 2'  Y 3'  Y 2'  Y 2'  Y 2'  Y 3'  Y 3'   TP 5 finger Y 2/10 Y 2/10 Y 2/10  Y 2/10  Y  2/10  Y  2/10  Y 2/10  Y 2/10  Y  2/10  Y 2/10   TP key Y 2/10 Y 2/10 Y2/10  Y 2/10  Y  2/10  Y  2/10  Y  2/10  Y  2/10  Y  2/10  Y 2/10   Jerry 25 2/10 25# 2/10 25# 2/10  25 2/10  25  2/10  25  2/10  25  2/10  25  2/10  25  2/10  25 2/10   Blue V 2/10 V 2/10 V  2/10  V 2/10  V  2/10  IV  2/10  IV  2/10  IV  2/10  V  2/10  V 2/10                 DB E/F 5 2/10 5# 2/10 5# 2/10 5# 2/10  5  2/10  4  2/10  4  2/10  4  2/10  5  2/10  5 2/10        S/P 1 5 2/10 1 5 2/10 1 5 2/10 1 5# 2/10  1 5  2/10  1  2/10  1 5  2/10  1 5  2/10  1 5  2/10  1 5 2/10   Nibs Y 14 Y 16' Y 16  Y 16  Y 16  T 14  T 14  T 14  Y 16  Y 16x   Zottman's 5 2/10 5# 2/10 5# 2/10 5# 2/10  5  2/10  4  2/10  4  2/10  NP  5  2/10  5 2/10   FF/Scapt 5 2/10 5# 2/10 5# 2/10 5# 2/10  5  2/10  4  2/10  4  2/10  NP  5  2/10  5 2/10                           Crate Lift     Chair to Table       Table to Table      5#  10x each  NV  5#  Chair<>Table  Floor<>Chair             Writing   Practice      1x  1x  1x                                     Flori Fullassembly  3'--R- 21 3' R 20 5, L 21    NP  2' 19x NP  3'  3'  3'  16 rows   3  22  rows'  3' 21x   Comp  Y 3' Y 2' Y 3'  Y 3'  Y 3'  Y 3'  Y 3'  NP  Y 3'  Y 3'    UBE  4/4  4/4  4/4 5/5  4/4  3/2  3/3  NP  3/3  4/4    Nu-step     L 3 5' L3 5'  L3 6'10"              Tabletop Stretch          3x10"                   Modalities

## 2019-09-05 ENCOUNTER — OFFICE VISIT (OUTPATIENT)
Dept: PHYSICAL THERAPY | Facility: CLINIC | Age: 59
End: 2019-09-05
Payer: COMMERCIAL

## 2019-09-05 DIAGNOSIS — I63.512 ACUTE ISCHEMIC LEFT MIDDLE CEREBRAL ARTERY (MCA) STROKE (HCC): Primary | ICD-10-CM

## 2019-09-05 PROCEDURE — 97110 THERAPEUTIC EXERCISES: CPT

## 2019-09-05 PROCEDURE — 97112 NEUROMUSCULAR REEDUCATION: CPT

## 2019-09-05 NOTE — PROGRESS NOTES
Daily Note     Today's date: 2019  Patient name: Noman Cash  : 1960  MRN: [id number]  Referring provider: Maria C Camilo  Dx:   Encounter Diagnosis     ICD-10-CM    1  Acute ischemic left middle cerebral artery (MCA) stroke (HCC) I63 512                   Subjective: Pt  very emotional upon entering therapy this day as she reports she "dropped a grocery bag in her yard and she wishes things could go back to normal "      Objective: See treatment diary below  AROM right hand -grossly WFL all ranges  Strength-  II- 32/40; 3 MICHAEL- 9/10; Key-10/10                  Dynamometer- biceps-R/L-30/35 @ 86%; Shoulder FE- 30/38 @81%  Sensation- intact to LT all digits  Coordination- impaired for FMS-3 minute  assembly- R/L- @78%   See treatment diary below        Assessment: Tolerated treatment well  Patient demonstrated fatigue post treatment, exhibited good technique with therapeutic exercises and would benefit from continued PT  Initiated PNF patterns with theraband as well as LTP/MTP for increased strength  Pt  also performed zottmans and FF/Scaption on foam for improved proprioception and balance  Plan: Progress treatment as tolerated         Precautions: avoid unsafe lifting, activity for strength consideration    Manual  9/3 9/5 7/22  7/25  7/29  7/31  8/5  8/12  8/15  8/26   TP HEP        Y TP                                              8/19 8/21 8/26 8/29 9/3 9/5       TP  Y 2' Y 2' Y 2'  Y 2'  Y 3'  Y 3'  Y 2'  Y 2'  Y 3'  Y 3'   5 finger Y 2/10 Y 2/10 Y 2/10  Y 2/10  Y  2/10  Y  2/10  Y 2/10  Y 2/10  Y  2/10  Y 2/10   Key Y 2/10 Y 2/10 Y2/10  Y 2/10  Y  2/10  Y  2/10  Y  2/10  Y  2/10  Y  2/10  Y 2/10   Jerry 25 2/10 25# 2/10 25# 2/10  25 2/10  25  2/10  25  2/10  25  2/10  25  2/10  25  2/10  25 2/10   Blue V 2/10 V 2/10 V  2/10  V 2/10  V  2/10  IV  2/10  IV  2/10  IV  2/10  V  2/10  V 2/10                 DB E/F 5 2/10 5# 2/10 5# 2/10 5# 2/10  5  2/10  5  2/10  4  2/10  4  2/10  5  2/10  5 2/10   S/P 1 5210 1 5 2/10 1 5 2/10 1 5# 2/10   1 5  2/10  1 5  2/10  1 5  2/10  1 5  2/10  1 5  2/10  1 5 2/10   Nibs Y 15 Y 12' Y 16  Y 16  Y 16 NP  T 14  T 14  Y 16  Y 16x   Zottmans 5 2/10 5# 2/10 5# 2/10 5# 2/10  5  2/10  3  2/10  On Foam  4  2/10  NP  5  2/10  5 2/10   FF/Scap 5 2/10 5# 2/10 5# 2/10 5# 2/10  5  2/10  3  2/10  On Foam  4  2/10  NP  5  2/10  5 2/10   MTP/LTP  D1  D2           I 15x    15x   each           Crate Lift        5#  10x each  NV  5#  Chair<>Table  Floor<>Chair  NP           Cursive Writing      1x  1x  1x  NP                                  Flori Assembly Fullassembly  3'--R- 21 3' R 20 5, L 21    NP  2' 19x NP  NP  3'  3'  16 rows   3  22  rows'  3' 21x   Comp  Y 3' Y 2' Y 3'  Y 3'  Y 3'  Y 3'  Y 3'  NP  Y 3'  Y 3'   UBE  4/4  4/4  4/4 5/5  4/4  NP  3/3  NP  3/3  4/4   Nu-step     L 3 5' L3 5'  L3 6'10"  L3  10'                                  3x10" 3x1                 Modalities

## 2019-09-09 ENCOUNTER — OFFICE VISIT (OUTPATIENT)
Dept: PHYSICAL THERAPY | Facility: CLINIC | Age: 59
End: 2019-09-09
Payer: COMMERCIAL

## 2019-09-09 DIAGNOSIS — I63.512 ACUTE ISCHEMIC LEFT MIDDLE CEREBRAL ARTERY (MCA) STROKE (HCC): Primary | ICD-10-CM

## 2019-09-09 DIAGNOSIS — E78.49 OTHER HYPERLIPIDEMIA: ICD-10-CM

## 2019-09-09 PROCEDURE — 97110 THERAPEUTIC EXERCISES: CPT

## 2019-09-09 PROCEDURE — 97112 NEUROMUSCULAR REEDUCATION: CPT

## 2019-09-09 NOTE — PROGRESS NOTES
Daily Note     Today's date: 2019  Patient name: Mihaela Parham  : 1960  MRN: [id number]  Referring provider: Titi Cox  Dx:   Encounter Diagnosis     ICD-10-CM    1  Acute ischemic left middle cerebral artery (MCA) stroke (HCC) I63 512    2  Other hyperlipidemia E78 49                   Subjective: Pt reports she felt some soreness in her shoulder/back area after last session  Objective: See treatment diary below  AROM right hand -grossly WFL all ranges  Strength-  II- ; 3 MICHAEL- 9/10; Key-10/10                  Dynamometer- biceps-R/L-30/35 @ 86%; Shoulder FE- 30/38 @81%  Sensation- intact to LT all digits  Coordination- impaired for FMS-3 minute  assembly-    See treatment diary below      Assessment: Tolerated treatment well today  Pt displayed greater ease with balance challenging exercises today  Pt with no display of fatigue during session   Plan: Progress treatment as tolerated         Precautions: avoid unsafe lifting, activity for strength consideration    Manual  9/3 9/5 9/9  7/25  7/29  7/31  8/5  8/12  8/15  8/26   TP HEP        Y TP                                              8/19 8/21 8/26 8/29 9/3 9/5 9/9      TP  Y 2' Y 2' Y 2'  Y 2'  Y 3'  Y 3'  Y 3'  Y 2'  Y 3'  Y 3'   5 finger Y 2/10 Y 2/10 Y 2/10  Y 2/10  Y  2/10  Y  2/10  Y 2/10  Y 2/10  Y  2/10  Y 2/10   Key Y 2/10 Y 2/10 Y2/10  Y 2/10  Y  2/10  Y  2/10  Y  2/10  Y  2/10  Y  2/10  Y 2/10   Jerry 25 2/10 25# 2/10 25# 2/10  25 2/10  25  2/10  25  2/10  25  2/10  25  2/10  25  2/10  25 2/10   Blue V 2/10 V 2/10 V  2/10  V 2/10  V  2/10  IV  2/10 V  2/10  IV  2/10  V  2/10  V 2/10                 DB E/F 5 2/10 5# 2/10 5# 2/10 5# 2/10  5  2/10  5  2/10  5  2/10  4  2/10  5  2/10  5 2/10   S/P 1 5210 1 5 2/10 1 5 2/10 1 5# 2/10   1 5  2/10  1 5  2/10  1 5  2/10  1 5  2/10  1 5  2/10  1 5 2/10   Nibs Y 14 Y 16' Y 16  Y 16  Y 16 NP  T 14  T 14  Y 16  Y 16x   Zottmans 5 2/10 5# 2/10 5# 2/10 5# 2/10  5  2/10  3  2/10  On Foam  3  2/10 Foam  NP  5  2/10  5 2/10   FF/Scap 5 2/10 5# 2/10 5# 2/10 5# 2/10  5  2/10  3  2/10  On Foam  3  2/10 foam  NP  5  2/10  5 2/10   MTP/LTP  D1  D2           I 15x    15x   each  I 2/10         Crate Lift        5#  10x each  NV  5#  Chair<>Table  Floor<>Chair  NP  NP         Cursive Writing      1x  1x  1x  NP                                  Flori Assembly Fullassembly  3'--R- 21 3' R 20 5, L 21    NP  2' 19x NP  NP  NP  3'  16 rows   3  22  rows'  3' 21x   Comp  Y 3' Y 2' Y 3'  Y 3'  Y 3'  Y 3'  Y 3'  NP  Y 3'  Y 3'   UBE  4/4  4/4  4/4 5/5  4/4  NP  4/4  NP  3/3  4/4   Nu-step     L 3 5' L3 5'  L3 6'10"  L3  10'  L3 6'                                3x10" 3x1                 Modalities

## 2019-09-12 ENCOUNTER — OFFICE VISIT (OUTPATIENT)
Dept: PHYSICAL THERAPY | Facility: CLINIC | Age: 59
End: 2019-09-12
Payer: COMMERCIAL

## 2019-09-12 DIAGNOSIS — E78.49 OTHER HYPERLIPIDEMIA: ICD-10-CM

## 2019-09-12 DIAGNOSIS — I63.512 ACUTE ISCHEMIC LEFT MIDDLE CEREBRAL ARTERY (MCA) STROKE (HCC): Primary | ICD-10-CM

## 2019-09-12 PROCEDURE — 97112 NEUROMUSCULAR REEDUCATION: CPT

## 2019-09-12 PROCEDURE — 97110 THERAPEUTIC EXERCISES: CPT

## 2019-09-12 NOTE — PROGRESS NOTES
Daily Note     Today's date: 2019  Patient name: Messi Mcmanus  : 1960  MRN: [id number]  Referring provider: Sangita Godfrey  Dx:   Encounter Diagnosis     ICD-10-CM    1  Acute ischemic left middle cerebral artery (MCA) stroke (HCC) I63 512    2  Other hyperlipidemia E78 49                   Subjective: Pt  reports her hand seemed to "cramp" the other day when she was turning her car key  Objective: See treatment diary below  AROM right hand -grossly WFL all ranges  Strength-  II- /40; 3 MICHAEL- 9/10; Key-10/10                  Dynamometer- biceps-R/L-30/35 @ 86%; Shoulder FE- 30/38 @81%  Sensation- intact to LT all digits  Coordination- impaired for FMS-3 minute  assembly-    See treatment diary below      Assessment: Tolerated treatment well  Patient exhibited good technique with therapeutic exercises and would benefit from continued PT  Pt  with improved functional activity estephanie this day with TE  Added 5# shelf <>shelf to simulate her work environment  Plan: Progress treatment as tolerated         Precautions: avoid unsafe lifting, activity for strength consideration    Manual  9/3 9/5 9/9  7/25  7/29  7/31  8/5  8/12  8/15  8/26   TP HEP        Y TP                                              8/19 8/21 8/26 8/29 9/3 9/5 9/9 9/12     TP  Y 2' Y 2' Y 2'  Y 2'  Y 3'  Y 3'  Y 3'  Y 3'  Y 3'  Y 3'   5 finger Y 2/10 Y 2/10 Y 2/10  Y 2/10  Y  2/10  Y  2/10  Y 2/10  Y 2/10  Y  2/10  Y 2/10   Key Y 2/10 Y 2/10 Y2/10  Y 2/10  Y  2/10  Y  2/10  Y  2/10  Y  2/10  Y  2/10  Y 2/10   Jerry 25 2/10 25# 2/10 25# 2/10  25 2/10  25  2/10  25  2/10  25  2/10  25  2/10  25  2/10  25 2/10   Blue V 2/10 V 2/10 V  2/10  V 2/10  V  2/10  IV  2/10 V  2/10  IV  2/10  V  2/10  V 2/10                 DB E/F 5 2/10 5# 2/10 5# 2/10 5# 2/10  5  2/10  5  2/10  5  2/10  4  2/10  5  2/10  5 2/10   S/P 1 5210 1 5 2/10 1 5 2/10 1 5# 2/10   1 5  2/10  1 5  2/10  1 5  2/10  1 5  2/10  1 5  2/10  1 5 2/10   Nibs Y 15 Y 12' Y 16  Y 16  Y 16 NP  T 14  T 14  Y 16  Y 16x   Zottmans 5 2/10 5# 2/10 5# 2/10 5# 2/10  5  2/10  3  2/10  On Foam  3  2/10 Foam 4  2/10  On Foam  4  2/10  On Foam  5 2/10   FF/Scap 5 2/10 5# 2/10 5# 2/10 5# 2/10  5  2/10  3  2/10  On Foam  3  2/10 foam 4  2/10  On Foam  4  2/10  On Foam  5 2/10   MTP/LTP  D1  D2  ER           I 15x    15x   each  I 2/10 I 2/10 each  I 2/10  I 2/10  1 2/10  ER 2/10     Crate Lift        5#  10x each  NV  5#  Chair<>Table  Floor<>Chair  NP  NP  5#  shelf<>shelf  5#  Shelf<>shelf     Cursive Writing      1x  1x  1x  NP    NP  NP                            Flori Assembly Fullassembly  3'--R- 21 3' R 20 5, L 21    NP  2' 19x NP  NP  NP  NP  3' 21x   Comp  Y 3' Y 2' Y 3'  Y 3'  Y 3'  Y 3'  Y 3'  Y 3'  Y 3'  Y 3'   UBE  4/4  4/4  4/4 5/5  4/4  NP  4/4  NP    4/4   Nu-step     L 3 5' L3 5'  L3 6'10"  L3  10'  L3 6'  L4  10'  L4  10'                            3x10" 3x1                 Modalities

## 2019-09-16 ENCOUNTER — OFFICE VISIT (OUTPATIENT)
Dept: PHYSICAL THERAPY | Facility: CLINIC | Age: 59
End: 2019-09-16
Payer: COMMERCIAL

## 2019-09-16 DIAGNOSIS — E78.49 OTHER HYPERLIPIDEMIA: ICD-10-CM

## 2019-09-16 DIAGNOSIS — I63.512 ACUTE ISCHEMIC LEFT MIDDLE CEREBRAL ARTERY (MCA) STROKE (HCC): Primary | ICD-10-CM

## 2019-09-16 PROCEDURE — 97112 NEUROMUSCULAR REEDUCATION: CPT

## 2019-09-16 PROCEDURE — 97530 THERAPEUTIC ACTIVITIES: CPT

## 2019-09-16 PROCEDURE — 97110 THERAPEUTIC EXERCISES: CPT

## 2019-09-16 NOTE — PROGRESS NOTES
Daily Note     Today's date: 2019  Patient name: Messi Mcmanus  : 1960  MRN: [id number]  Referring provider: Sangita Godfrey  Dx:   Encounter Diagnosis     ICD-10-CM    1  Acute ischemic left middle cerebral artery (MCA) stroke (HCC) I63 512    2  Other hyperlipidemia E78 49                   Subjective: Pt reports she is able to be more productive at home but insists she has trouble picking change up with her fingers  Objective: See treatment diary below  AROM right hand -grossly WFL all ranges  Strength-  II- 32/40; 3 MICHAEL- 9/10; Key-10/10                  Dynamometer- biceps-R/L-30/35 @ 86%; Shoulder FE- 30/38 @81%  Sensation- intact to LT all digits  Coordination- impaired for FMS-3 minute  assembly-     See treatment diary below  Assessment: Tolerated treatment well  Pt reports some cramping of her affected fingers during session  Pt instructed to take breaks during session  Pt able to progress during session  Plan: Progress treatment as tolerated         Precautions: avoid unsafe lifting, activity for strength consideration    Manual  9/3 9/5 9/9  9/16  7/29  7/31  8/5  8/12  8/15  8/26   TP HEP        Y TP                                              8/19 8/21 8/26 8/29 9/3 9/5 9/9 9/12 9/16    TP  Y 2' Y 2' Y 2'  Y 2'  Y 3'  Y 3'  Y 3'  Y 3'  Y 3'  Y 3'   5 finger Y 2/10 Y 2/10 Y 2/10  Y 2/10  Y  2/10  Y  2/10  Y 2/10  Y 2/10  Y  2/10  Y 2/10   Key Y 2/10 Y 2/10 Y2/10  Y 2/10  Y  2/10  Y  2/10  Y  2/10  Y  2/10  Y  2/10  Y 2/10   Jerry 25 2/10 25# 2/10 25# 2/10  25 2/10  25  2/10  25  2/10  25  2/10  25  2/10  25  2/10  25 2/10   Blue V 2/10 V 2/10 V  2/10  V 2/10  V  2/10  IV  2/10 V  2/10  IV  2/10  V  2/10  V 2/10                 DB E/F 5 2/10 5# 2/10 5# 2/10 5# 2/10  5  2/10  5  2/10  5  2/10  4  2/10 4   2/10  5 2/10   S/P 1 5210 1 5 2/10 1 5 2/10 1 5# 2/10   1 5  2/10  1 5  2/10  1 5  2/10  1 5  2/10  #2  2/10  1 5 2/10   Nibs Y 14 Y 16' Y 16  Y 16  Y 16 NP  T 14  T 14  Y 16  Y 16x   Zottmans 5 2/10 5# 2/10 5# 2/10 5# 2/10  5  2/10  3  2/10  On Foam  3  2/10 Foam 4  2/10  On Foam  4  2/10  On Foam  5 2/10   FF/Scap 5 2/10 5# 2/10 5# 2/10 5# 2/10  5  2/10  3  2/10  On Foam  3  2/10 foam 4  2/10  On Foam  4  2/10  On Foam  5 2/10   MTP/LTP  D1  D2  ER           I 15x    15x   each  I 2/10 I 2/10 each  I 2/10  I 2/10  1 2/10  ER 2/10     Crate Lift        5#  10x each  NV  5#  Chair<>Table  Floor<>Chair  NP  NP  5#  shelf<>shelf  5#  Shelf<>shelf     Cursive Writing      1x  1x  1x  NP    NP  NP                            Flori Assembly Fullassembly  3'--R- 21 3' R 20 5, L 21    NP  2' 19x NP  NP  NP  NP  3' 21x   Comp  Y 3' Y 2' Y 3'  Y 3'  Y 3'  Y 3'  Y 3'  Y 3'  Y 3'  Y 3'   UBE  4/4  4/4  4/4 5/5  4/4  NP  4/4  NP  4/4  4/4   Nu-step     L 3 5' L3 5'  L3 6'10"  L3  10'  L3 6'  L4  10'  L4  10'  L4 8'                          3x10" 3x1                 Modalities

## 2019-09-19 ENCOUNTER — OFFICE VISIT (OUTPATIENT)
Dept: PHYSICAL THERAPY | Facility: CLINIC | Age: 59
End: 2019-09-19
Payer: COMMERCIAL

## 2019-09-19 DIAGNOSIS — E78.49 OTHER HYPERLIPIDEMIA: ICD-10-CM

## 2019-09-19 DIAGNOSIS — I63.512 ACUTE ISCHEMIC LEFT MIDDLE CEREBRAL ARTERY (MCA) STROKE (HCC): Primary | ICD-10-CM

## 2019-09-19 PROCEDURE — 97112 NEUROMUSCULAR REEDUCATION: CPT

## 2019-09-19 PROCEDURE — 97110 THERAPEUTIC EXERCISES: CPT

## 2019-09-19 NOTE — PROGRESS NOTES
PT Re-Evaluation     Today's date: 2019  Patient name: Amber Gerardo  : 1960  MRN: [id number]  Referring provider: iWliam Serrato  Dx:   Encounter Diagnosis     ICD-10-CM    1  Acute ischemic left middle cerebral artery (MCA) stroke (HCC) I63 512    2  Other hyperlipidemia E78 49        Start Time: 1010  Stop Time: 1130  Total time in clinic (min): 80 minutes    Assessment  Assessment details: Pt is a 60 YO female presenting to PT with pain, decreased AROM, strength and tolerance to activity  Pt would benefit from skilled intervention to address these issues and maximize overall function  Occupation-  at [employer]- off with this injury  Dominant- Right; Involved-Right  Pt has noted increase in strength and dexterity in her right hand and UE  She is completing more tasks at home without assistance, but notes fatigue is a limiter in longer tasks  She cont to avoid heavy lifting  Goals  ST  Instruct in safety with loss of strength considerations            2  Increase strength to complete ADL and self care  LT  Increase functional motion and strength for independence with ADL and self care by DC            2  Ability to RTW and recreational activity by DC    Plan  Patient would benefit from: skilled physical therapy  Planned therapy interventions: manual therapy, therapeutic exercise, strengthening and home exercise program  Frequency: 2x week  Duration in weeks: 4  Treatment plan discussed with: family        Subjective Evaluation    History of Present Illness  Date of onset: 7/10/2019  Mechanism of injury: Insidious onset with sudden loss of strength, coordination in her Right U and LE   LE has recovered for independent function    RUE with loss of full coordination and strength  Pain  Current pain ratin  At best pain ratin  At worst pain ratin    Social Support  Lives with: spouse    Employment status: not working  Hand dominance: right    Treatments  Current treatment: physical therapy  Patient Goals  Patient goals for therapy: increased strength, independence with ADLs/IADLs, return to sport/leisure activities and return to work          Objective     General Comments:      Wrist/Hand Comments  AROM right hand -grossly WFL all ranges  Strength-  II- 37/45; 3 MICHAEL- 9/11; Key-9/11                  Dynamometer- biceps-R/L-30/35 @ 86%;  Shoulder FE- 30/38 @81%  Sensation- intact to LT all digits  Coordination- impaired for FMS-3 minute  assembly- R/L-21/27 @78%         Precautions: avoid unsafe lifting, activity for strength consideration    Manual  9/3 9/5 9/9  9/16  9/19        TP HEP        Y TP                                               8/19 8/21 8/26 8/29 9/3 9/5 9/9 9/12 9/16 9/19   TP  Y 2' Y 2' Y 2'  Y 2'  Y 3'  Y 3'  Y 3'  Y 3'  Y 3'  Y 3'   5 finger Y 2/10 Y 2/10 Y 2/10  Y 2/10  Y  2/10  Y  2/10  Y 2/10  Y 2/10  Y  2/10  Y 2/10   Key Y 2/10 Y 2/10 Y2/10  Y 2/10  Y  2/10  Y  2/10  Y  2/10  Y  2/10  Y  2/10  Y 2/10   Jerry 25 2/10 25# 2/10 25# 2/10  25 2/10  25  2/10  25  2/10  25  2/10  25  2/10  25  2/10  30 2/10   Blue V 2/10 V 2/10 V  2/10  V 2/10  V  2/10  IV  2/10 V  2/10  IV  2/10  V  2/10  V 2/10                           DB E/F 5 2/10 5# 2/10 5# 2/10 5# 2/10  5  2/10  5  2/10  5  2/10  4  2/10 4   2/10  5 2/10   S/P 1 5210 1 5 2/10 1 5 2/10 1 5# 2/10   1 5  2/10  1 5  2/10  1 5  2/10  1 5  2/10  #2  2/10 2 2/10   Nibs Y 14 Y 16' Y 16  Y 16  Y 16 NP  T 14  T 14  Y 16  Y 16x   Zottmans 5 2/10 5# 2/10 5# 2/10 5# 2/10  5  2/10  3  2/10  On Foam  3  2/10 Foam 4  2/10  On Foam  4  2/10  On Foam  4 2/10  On Foam   FF/Scap 5 2/10 5# 2/10 5# 2/10 5# 2/10  5  2/10  3  2/10  On Foam  3  2/10 foam 4  2/10  On Foam  4  2/10  On Foam  4 2/10  On Foam   MTP/LTP  D1  D2  ER           I 15x     15x   each  I 2/10 I 2/10 each  I 2/10  I 2/10  1 2/10  ER 2/10 II  II  II  II  2/10 each   Crate Lift         5#  10x each  NV  5#  Chair<>Table  Floor<>Chair  NP  NP  5#  shelf<>shelf  5#  Shelf<>shelf  5#  shelf<>shelf   Cursive Writing      1x  1x  1x  NP    NP  NP                             Flori Assembly Fullassembly  3'--R- 21 3' R 20 5, L 21    NP  2' 19x NP  NP  NP   NP     Comp  Y 1' Y 2' Y 3'  Y 3'  Y 3'  Y 3'  Y 3'  Y 3'  Y 3'  Y 3'   UBE  4/4  4/4  4/4 5/5  4/4  NP  4/4  NP  4/4     Nu-step     L 3 5' L3 5'  L3 6'10"  L3  10'  L3 6'  L4  10'  L4  10'  L5 10''                                      3x10" 3x1

## 2019-09-20 ENCOUNTER — HOSPITAL ENCOUNTER (OUTPATIENT)
Dept: MAMMOGRAPHY | Facility: HOSPITAL | Age: 59
Discharge: HOME | End: 2019-09-20
Attending: OBSTETRICS & GYNECOLOGY
Payer: COMMERCIAL

## 2019-09-20 VITALS — WEIGHT: 147 LBS | BODY MASS INDEX: 27.75 KG/M2 | HEIGHT: 61 IN

## 2019-09-20 DIAGNOSIS — Z12.31 VISIT FOR SCREENING MAMMOGRAM: ICD-10-CM

## 2019-09-20 PROCEDURE — 77063 BREAST TOMOSYNTHESIS BI: CPT

## 2019-09-20 PROCEDURE — 77067 SCR MAMMO BI INCL CAD: CPT

## 2019-09-23 ENCOUNTER — OFFICE VISIT (OUTPATIENT)
Dept: PHYSICAL THERAPY | Facility: CLINIC | Age: 59
End: 2019-09-23
Payer: COMMERCIAL

## 2019-09-23 DIAGNOSIS — I63.512 ACUTE ISCHEMIC LEFT MIDDLE CEREBRAL ARTERY (MCA) STROKE (HCC): Primary | ICD-10-CM

## 2019-09-23 DIAGNOSIS — E78.49 OTHER HYPERLIPIDEMIA: ICD-10-CM

## 2019-09-23 PROCEDURE — 97110 THERAPEUTIC EXERCISES: CPT

## 2019-09-23 PROCEDURE — 97112 NEUROMUSCULAR REEDUCATION: CPT

## 2019-09-23 PROCEDURE — 97535 SELF CARE MNGMENT TRAINING: CPT

## 2019-09-23 NOTE — PROGRESS NOTES
Daily Note     Today's date: 2019  Patient name: Christiana Armando  : 1960  MRN: [id number]  Referring provider: Zak Quinteros  Dx:   Encounter Diagnosis     ICD-10-CM    1  Acute ischemic left middle cerebral artery (MCA) stroke (HCC) I63 512    2  Other hyperlipidemia E78 49                   Subjective: Pt  reports she does her puddy at home  Pt  continues to c/o difficulty holding onto certain objects  Objective: See treatment diary below    General Comments:      Wrist/Hand Comments  AROM right hand -grossly WFL all ranges  Strength-  II- ; 3 MICHAEL- 7/10; Key-9/10                  Dynamometer- biceps-R/L-3035 @ 86%; Shoulder FE-  @81%  Sensation- intact to LT all digits  Coordination- impaired for FMS-3 minute  assembly- R/L- @105%        Assessment: Tolerated treatment well  Patient exhibited good technique with therapeutic exercises and would benefit from continued PT  Pt  given HEP for theraband with reverse demonstration provided  Will retest strength NV  Pt  did improve with dexterity as seen in [address] Assembly test       Plan: Progress treatment as tolerated         Precautions: avoid unsafe lifting, activity for strength consideration    Manual  9/3 9/5 9/9  9/16  9/19 9/23       TP HEP        Y TP    Theraband HEP                                           9/23 8/21 8/26 8/29 9/3 9/5 9/9 9/12 9/16 9/19   TP  Y 3' Y 2' Y 2'  Y 2'  Y 3'  Y 3'  Y 3'  Y 3'  Y 3'  Y 3'   5 finger Y 2/10 Y 2/10 Y 2/10  Y 2/10  Y  2/10  Y  2/10  Y 2/10  Y 2/10  Y  2/10  Y 2/10   Key Y 2/10 Y 2/10 Y2/10  Y 2/10  Y  2/10  Y  2/10  Y  2/10  Y  2/10  Y  2/10  Y 2/10   Jerry 30 2/10 25# 2/10 25# 2/10  25 2/10  25  2/10  25  2/10  25  2/10  25  2/10  25  2/10  30 2/10   Blue V 2/10 V 2/10 V  2/10  V 2/10  V  2/10  IV  2/10 V  2/10  IV  2/10  V  2/10  V 2/10                           DB E/F 5 2/10 5# 2/10 5# 2/10 5# 2/10  5  2/10  5  2/10  5  2/10  4  2/10 4   2/10  5 2/10   S/P 2 210 1 5 2/10 1 5 2/10 1 5# 2/10   1 5  2/10  1 5  2/10  1 5  2/10  1 5  2/10  #2  2/10 2 2/10   Nibs Y 12 Y 16' Y 16  Y 16  Y 16 NP  T 14  T 14  Y 16  Y 16x   Zottmans 5 2/10  On Foam 5# 2/10 5# 2/10 5# 2/10  5  2/10  3  2/10  On Foam  3  2/10 Foam 4  2/10  On Foam  4  2/10  On Foam  4 2/10  On Foam   FF/Scap 5 2/10  On Foam 5# 2/10 5# 2/10 5# 2/10  5  2/10  3  2/10  On Foam  3  2/10 foam 4  2/10  On Foam  4  2/10  On Foam  4 2/10  On Foam   MTP/LTP  D1  D2  ER  IR  II  II  II  II  II         I 15x     15x   each  I 2/10 I 2/10 each  I 2/10  I 2/10  1 2/10  ER 2/10 II  II  II  II  2/10 each   Crate Lift     5#  Shelf<>shelf    5#  10x each  NV  5#  Chair<>Table  Floor<>Chair  NP  NP  5#  shelf<>shelf  5#  Shelf<>shelf  5#  shelf<>shelf   Cursive Writing      1x  1x  1x  NP    NP  NP                             Flori Assembly Fullassembly  3'--R- 21, L-20 3' R 20 5, L 21    NP  2' 19x NP  NP  NP   NP     Comp  Y 1' Y 2' Y 3'  Y 3'  Y 3'  Y 3'  Y 3'  Y 3'  Y 3'  Y 3'   UBE  4/5  4/4  4/4 5/5  4/4  NP  4/4  NP  4/4     Nu-step  L5 7'   L 3 5' L3 5'  L3 6'10"  L3  10'  L3 6'  L4  10'  L4  10'  L5 10''                                      3x10" 3x1

## 2019-09-24 ENCOUNTER — ANNUAL EXAM (OUTPATIENT)
Dept: OBGYN CLINIC | Facility: MEDICAL CENTER | Age: 59
End: 2019-09-24
Payer: COMMERCIAL

## 2019-09-24 VITALS — WEIGHT: 146.8 LBS | SYSTOLIC BLOOD PRESSURE: 120 MMHG | DIASTOLIC BLOOD PRESSURE: 62 MMHG | BODY MASS INDEX: 27.74 KG/M2

## 2019-09-24 DIAGNOSIS — Z12.31 ENCOUNTER FOR SCREENING MAMMOGRAM FOR MALIGNANT NEOPLASM OF BREAST: ICD-10-CM

## 2019-09-24 DIAGNOSIS — Z01.419 ENCOUNTER FOR WELL WOMAN EXAM WITH ROUTINE GYNECOLOGICAL EXAM: Primary | ICD-10-CM

## 2019-09-24 PROCEDURE — 87624 HPV HI-RISK TYP POOLED RSLT: CPT | Performed by: OBSTETRICS & GYNECOLOGY

## 2019-09-24 PROCEDURE — S0612 ANNUAL GYNECOLOGICAL EXAMINA: HCPCS | Performed by: OBSTETRICS & GYNECOLOGY

## 2019-09-24 PROCEDURE — G0145 SCR C/V CYTO,THINLAYER,RESCR: HCPCS | Performed by: OBSTETRICS & GYNECOLOGY

## 2019-09-24 NOTE — PATIENT INSTRUCTIONS
Thank you for your confidence in our team    We appreciate you and welcome your feedback  If you receive a survey from us, please take a few moments to let us know how we are doing     Sincerely,   Kanu Watt MD

## 2019-09-24 NOTE — PROGRESS NOTES
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  She has the following concerns:  No GYN compaints; doing well, aware to schedule colonscopy  Had recent stroke    Health Maintenance:    She wears her seatbelt routinely  She does perform regular monthly self breast exams  She feels safe at home  Pap: 2016   Last mammogram:   Last colonoscopy: due     Past Medical History:   Diagnosis Date    Bladder stone     Hyperlipidemia     Stroke [facility])     Left hand    Ureteral calculus, right        Past Surgical History:   Procedure Laterality Date    GALLBLADDER SURGERY      onset:        Past OB/Gyn History:  OB History        0    Para   0    Term   0       0    AB   0    Living   0       SAB   0    TAB   0    Ectopic   0    Multiple   0    Live Births   0               Pt does not have menstrual issues      History of sexually transmitted infection: No   History of abnormal pap smears: No        Family History   Problem Relation Age of Onset    Stroke Mother         stroke syndrome    Cancer Maternal Grandmother     Stomach cancer Maternal Grandmother     No Known Problems Sister     No Known Problems Maternal Grandfather     No Known Problems Paternal Grandmother     No Known Problems Paternal Grandfather     No Known Problems Sister        Social History:  Social History     Socioeconomic History    Marital status: /Civil Union Spouse name: Not on file    Number of children: Not on file    Years of education: Not on file    Highest education level: Not on file   Occupational History    Not on file   Social Needs    Financial resource strain: Not on file    Food insecurity:     Worry: Not on file     Inability: Not on file    Transportation needs:     Medical: Not on file     Non-medical: Not on file   Tobacco Use    Smoking status: Never Smoker    Smokeless tobacco: Never Used   Substance and Sexual Activity    Alcohol use: Yes     Frequency: Monthly or less     Drinks per session: 1 or 2     Binge frequency: Never     Comment: social    Drug use: Never    Sexual activity: Yes     Partners: Male     Birth control/protection: Post-menopausal   Lifestyle    Physical activity:     Days per week: Not on file     Minutes per session: Not on file    Stress: Not on file   Relationships    Social connections:     Talks on phone: Not on file     Gets together: Not on file     Attends Sikh service: Not on file     Active member of club or organization: Not on file     Attends meetings of clubs or organizations: Not on file     Relationship status: Not on file    Intimate partner violence:     Fear of current or ex partner: Not on file     Emotionally abused: Not on file     Physically abused: Not on file     Forced sexual activity: Not on file   Other Topics Concern    Not on file   Social History Narrative    No living will     Allergies   Allergen Reactions    Penicillins Hives       Current Outpatient Medications:     ascorbic acid (VITAMIN C) 500 mg tablet, Take 500 mg by mouth daily, Disp: , Rfl:     aspirin (ECOTRIN LOW STRENGTH) 81 mg EC tablet, Take 1 tablet (81 mg total) by mouth daily, Disp: 90 tablet, Rfl: 0    CRANBERRY PO, Take 168 mg by mouth, Disp: , Rfl:     Dextromethorphan-guaiFENesin (MUCINEX DM PO), Take by mouth, Disp: , Rfl:     lovastatin (MEVACOR) 40 MG tablet, Take 1 tablet by mouth once daily, Disp: 90 tablet, Rfl: 3    Multiple Vitamins-Minerals (CENTRUM PO), Take by mouth, Disp: , Rfl:     omeprazole (PriLOSEC) 20 mg delayed release capsule, Take 20 mg by mouth daily, Disp: , Rfl:     VITAMIN E PO, Take 184 mg by mouth, Disp: , Rfl:     clopidogrel (PLAVIX) 75 mg tablet, Take 1 tablet (75 mg total) by mouth daily for 20 doses (Patient not taking: Reported on 8/23/2019), Disp: 20 tablet, Rfl: 0    Lactobacillus (ACIDOPHILUS PO), Take by mouth, Disp: , Rfl:       Review of Systems  Constitutional :no fever, feels well, no tiredness, no recent weight gain or loss  ENT: no ear ache, no loss of hearing, no nosebleeds or nasal discharge, no sore throat or hoarseness  Cardiovascular: no complaints of slow or fast heart beat, no chest pain, no palpitations, no leg claudication or lower extremity edema  Respiratory: no complaints of shortness of shortness of breath, no DIAZ  Breasts:no complaints of breast pain, breast lump, or nipple discharge  Gastrointestinal: no complaints of abdominal pain, constipation, nausea, vomiting, or diarrhea or bloody stools  Genitourinary : no complaints of dysuria, incontinence, pelvic pain, no dysmenorrhea, vaginal discharge or abnormal vaginal bleeding and as noted in HPI  Musculoskeletal: no complaints of arthralgia, no myalgia, no joint swelling or stiffness, no limb pain or swelling    Integumentary: no complaints of skin rash or lesion, itching or dry skin  Neurological: no complaints of headache, no confusion, no numbness or tingling, no dizziness or fainting    Objective      /62   Wt 66 6 kg (146 lb 12 8 oz)   LMP  (LMP Unknown)   BMI 27 74 kg/m²     General:   appears stated age, cooperative, alert normal mood and affect   Neck: normal, supple,trachea midline, no masses   Heart: regular rate and rhythm, S1, S2 normal, no murmur, click, rub or gallop   Lungs: clear to auscultation bilaterally   Breasts: normal appearance, no masses or tenderness, Inspection negative, No nipple retraction or dimpling, No nipple discharge or bleeding, No axillary or supraclavicular adenopathy   Abdomen: soft, non-tender, without masses or organomegaly   Vulva: normal female genitalia, Bartholin's, Urethra, Govan normal, normal female hair distribution   Vagina: normal vagina, no discharge, exudate, lesion, or erythema   Urethra: normal   Cervix: Normal, no discharge  pap done   Uterus: normal size, contour, position, consistency, mobility, non-tender   Adnexa: no mass, fullness, tenderness   Lymphatic palpation of lymph nodes in neck, axilla, groin and/or other locations: no lymphadenopathy or masses noted   Skin normal skin turgor and no rashes     Psychiatric orientation to person, place, and time: normal  mood and affect: normal

## 2019-09-25 LAB
HPV HR 12 DNA CVX QL NAA+PROBE: NEGATIVE
HPV16 DNA CVX QL NAA+PROBE: NEGATIVE
HPV18 DNA CVX QL NAA+PROBE: NEGATIVE

## 2019-09-26 ENCOUNTER — OFFICE VISIT (OUTPATIENT)
Dept: PHYSICAL THERAPY | Facility: CLINIC | Age: 59
End: 2019-09-26
Payer: COMMERCIAL

## 2019-09-26 DIAGNOSIS — I63.512 ACUTE ISCHEMIC LEFT MIDDLE CEREBRAL ARTERY (MCA) STROKE (HCC): Primary | ICD-10-CM

## 2019-09-26 DIAGNOSIS — E78.49 OTHER HYPERLIPIDEMIA: ICD-10-CM

## 2019-09-26 PROCEDURE — 97110 THERAPEUTIC EXERCISES: CPT

## 2019-09-26 PROCEDURE — 97112 NEUROMUSCULAR REEDUCATION: CPT

## 2019-09-26 NOTE — PROGRESS NOTES
Daily Note     Today's date: 2019  Patient name: Enrico Bueno  : 1960  MRN: [id number]  Referring provider: Cyndie Briscoe  Dx:   Encounter Diagnosis     ICD-10-CM    1  Acute ischemic left middle cerebral artery (MCA) stroke (HCC) I63 512    2  Other hyperlipidemia E78 49                   Subjective: Pt  without c/o  Objective: See treatment diary below    Wrist/Hand Comments  AROM right hand -grossly WFL all ranges  Strength-  II- ; 3 MICHAEL- 7/10; Key-9/10                  Dynamometer- biceps-R/L-3035 @ 86%; Shoulder FE-  @81%  Sensation- intact to LT all digits  Coordination- impaired for FMS-3 minute  assembly- R/L- @105%        Assessment: Tolerated treatment well  Patient exhibited good technique with therapeutic exercises and would benefit from continued PT  Pt  was able to estephanie addition of bicep to theraband, increase resistance for puddy, increase time on nu-step and reps for shelf<>shelf  Pt  with improved functional activity estephanie with POC this day  Plan: Progress treatment as tolerated  RA strength NV       Precautions: avoid unsafe lifting, activity for strength consideration    Manual  9/3 9/5 9/9  9/16  9/19 9/23 9/26      TP HEP        Y TP    Theraband HEP                                           9/23 9/26 8/26 8/29 9/3 9/5 9/9 9/12 9/16 9/19   TP  Y 3' O 2' Y 2'  Y 2'  Y 3'  Y 3'  Y 3'  Y 3'  Y 3'  Y 3'   5 finger Y 2/10 O 2/10 Y 2/10  Y 2/10  Y  2/10  Y  2/10  Y 2/10  Y 2/10  Y  2/10  Y 2/10   Key Y 2/10 O 2/10 Y2/10  Y 2/10  Y  2/10  Y  2/10  Y  2/10  Y  2/10  Y  2/10  Y 2/10   Jerry 30 2/10 35# 2/10 25# 2/10  25 2/10  25  2/10  25  2/10  25  2/10  25  2/10  25  2/10  30 2/10   Blue V 2/10 V 2/10 V  2/10  V 2/10  V  2/10  IV  2/10 V  2/10  IV  2/10  V  2/10  V 2/10                           DB E/F 5 2/10 5# 2/10 5# 2/10 5# 2/10  5  2/10  5  2/10  5  2/10  4  2/10 4   2/10  5 2/10   S/P 2 210 2 2/10 1 5 2/10 1 5# 2/10   1 5  2/10  1 5  2/10  1 5  2/10  1 5  2/10  #2  2/10 2 2/10   Nibs Y 16 Y 16' Y 16  Y 16  Y 16 NP  T 14  T 14  Y 16  Y 16x   Zottmans 5 2/10  On Foam 5# 2/10  On Foam 5# 2/10 5# 2/10  5  2/10  3  2/10  On Foam  3  2/10 Foam 4  2/10  On Foam  4  2/10  On Foam  4 2/10  On Foam   FF/Scap 5 2/10  On Foam 5# 2/10  On Foam 5# 2/10 5# 2/10  5  2/10  3  2/10  On Foam  3  2/10 foam 4  2/10  On Foam  4  2/10  On Foam  4 2/10  On Foam   MTP/LTP  D1  D2  ER  IR  Bicep  II  II  II  II  II  II  II  II  II  II  II       I 15x     15x   each  I 2/10 I 2/10 each  I 2/10  I 2/10  1 2/10  ER 2/10 II  II  II  II  2/10 each   Crate Lift     5#  Shelf<>shelf 5# 15x  5#  10x each  NV  5#  Chair<>Table  Floor<>Chair  NP  NP  5#  shelf<>shelf  5#  Shelf<>shelf  5#  shelf<>shelf   Cursive Writing      1x  1x  1x  NP    NP  NP                             Flori Assembly Fullassembly  3'--R- 21, L-20     NP  2' 19x NP  NP  NP   NP     Comp  Y 1' Y 2' Y 3'  Y 3'  Y 3'  Y 3'  Y 3'  Y 3'  Y 3'  Y 3'   UBE  4/5  3/3  4/4 5/5  4/4  NP  4/4  NP  4/4     Nu-step  L5 7'  L5 12' L 3 5' L3 5'  L3 6'10"  L3  10'  L3 6'  L4  10'  L4  10'  L5 10''                                      3x10" 3x1

## 2019-09-27 LAB
LAB AP GYN PRIMARY INTERPRETATION: NORMAL
Lab: NORMAL

## 2019-09-30 ENCOUNTER — OFFICE VISIT (OUTPATIENT)
Dept: FAMILY MEDICINE CLINIC | Facility: CLINIC | Age: 59
End: 2019-09-30
Payer: COMMERCIAL

## 2019-09-30 ENCOUNTER — OFFICE VISIT (OUTPATIENT)
Dept: PHYSICAL THERAPY | Facility: CLINIC | Age: 59
End: 2019-09-30
Payer: COMMERCIAL

## 2019-09-30 VITALS
DIASTOLIC BLOOD PRESSURE: 82 MMHG | HEIGHT: 61 IN | SYSTOLIC BLOOD PRESSURE: 124 MMHG | WEIGHT: 147.6 LBS | BODY MASS INDEX: 27.87 KG/M2

## 2019-09-30 DIAGNOSIS — I63.512 ACUTE ISCHEMIC LEFT MIDDLE CEREBRAL ARTERY (MCA) STROKE (HCC): Primary | ICD-10-CM

## 2019-09-30 DIAGNOSIS — E78.49 OTHER HYPERLIPIDEMIA: ICD-10-CM

## 2019-09-30 PROCEDURE — 97110 THERAPEUTIC EXERCISES: CPT

## 2019-09-30 PROCEDURE — 97112 NEUROMUSCULAR REEDUCATION: CPT

## 2019-09-30 PROCEDURE — 3008F BODY MASS INDEX DOCD: CPT | Performed by: FAMILY MEDICINE

## 2019-09-30 PROCEDURE — 97535 SELF CARE MNGMENT TRAINING: CPT

## 2019-09-30 PROCEDURE — 99213 OFFICE O/P EST LOW 20 MIN: CPT | Performed by: FAMILY MEDICINE

## 2019-09-30 NOTE — PATIENT INSTRUCTIONS

## 2019-09-30 NOTE — PROGRESS NOTES
Daily Note     Today's date: 2019  Patient name: Carrie Ramires  : 1960  MRN: [id number]  Referring provider: Marie Pizano  Dx:   Encounter Diagnosis     ICD-10-CM    1  Acute ischemic left middle cerebral artery (MCA) stroke (HCC) I63 512    2  Other hyperlipidemia E78 49                   Subjective: Pt  reports she saw her Dr  this morning who put a 5# restriction on her FMLA for three more months  Pt  also reports she dropped an egg this AM       Objective: See treatment diary below    Wrist/Hand Comments  AROM right hand -grossly WFL all ranges  Strength-  II- ; 3 MICHAEL- 9/10; Key-10/10                  Dynamometer- biceps-R/L-30/35 @ 86%; Shoulder FE- 30/38 @81%  Sensation- intact to LT all digits  Coordination- impaired for FMS-3 minute  assembly- R/L- @105%        Assessment: Tolerated treatment well  Patient exhibited good technique with therapeutic exercises and would benefit from continued PT  Pt  strength improved as seen above  Emphasis on dexterity this day  Plan: Progress treatment as tolerated         Precautions: avoid unsafe lifting, activity for strength consideration    Manual  9/3 9/5 9/9  9/16  9/19 9/23 9/26 9/30     TP HEP        Y TP    Theraband HEP                                           9/23 9/26 9/30 8/29 9/3 9/5 9/9 9/12 9/16 9/19   TP  Y 3' O 2' O 2'  Y 2'  Y 3'  Y 3'  Y 3'  Y 3'  Y 3'  Y 3'   5 finger Y 2/10 O 2/10 O 2/10  Y 2/10  Y  2/10  Y  2/10  Y 2/10  Y 2/10  Y  2/10  Y 2/10   Key Y 2/10 O 2/10 O2/10  Y 2/10  Y  2/10  Y  2/10  Y  2/10  Y  2/10  Y  2/10  Y 2/10   Jerry 30 2/10 35# 2/10 35# 2/10  25 2/10  25  2/10  25  2/10  25  2/10  25  2/10  25  2/10  30 2/10   Blue V 2/10 V 2/10 V  2/10  V 2/10  V  2/10  IV  2/10 V  2/10  IV  2/10  V  2/10  V 2/10                           DB E/F 5 2/10 5# 2/10 5# 2/10 5# 2/10  5  2/10  5  2/10  5  2/10  4  2/10 4   2/10  5 2/10   S/P 2 210 2 2/10 2 2/10 1 5# 2/10   1 5  2/10  1 5  2/10  1 5  2/10  1 5  2/10  #2  2/10 2 2/10   Nibs Y 16 Y 16' Y 16  Y 16  Y 16 NP  T 14  T 14  Y 16  Y 16x   Zottmans 5 2/10  On Foam 5# 2/10  On Foam 5# 2/10 5# 2/10  5  2/10  3  2/10  On Foam  3  2/10 Foam 4  2/10  On Foam  4  2/10  On Foam  4 2/10  On Foam   FF/Scap 5 2/10  On Foam 5# 2/10  On Foam 5# 2/10 5# 2/10  5  2/10  3  2/10  On Foam  3  2/10 foam 4  2/10  On Foam  4  2/10  On Foam  4 2/10  On Foam   MTP/LTP  D1  D2  ER  IR  Bicep  II  II  II  II  II  II  II  II  II  II  II II  II  II  II  II  II     I 15x     15x   each  I 2/10 I 2/10 each  I 2/10  I 2/10  1 2/10  ER 2/10 II  II  II  II  2/10 each   Crate Lift     5#  Shelf<>shelf 5# 15x  5#  Shelf<>Shelf  NV  5#  Chair<>Table  Floor<>Chair  NP  NP  5#  shelf<>shelf  5#  Shelf<>shelf  5#  shelf<>shelf   Cursive Writing       1x  1x  NP    NP  NP     Plastic Twiting      3'                 Flori Assembly Fullassembly  3'--R- 21, L-20     NP  2' 19x NP  NP  NP   NP     Comp  Y 1' Y 2' Y 3'  Y 3'  Y 3'  Y 3'  Y 3'  Y 3'  Y 3'  Y 3'   UBE  4/5  3/3 3/3 5/5  4/4  NP  4/4  NP  4/4     Nu-step  L5 7'  L5 12' L 3 10' L3 5'  L3 6'10"  L3  10'  L3 6'  L4  10'  L4  10'  L5 10''   Nips (Pink)     3'                 Pegs     3 Rows    3x10" 3x1

## 2019-09-30 NOTE — PROGRESS NOTES
Assessment/Plan:  Patient will continue physical therapy  Patient's limited to lifting no more than 5 lb with her right hand  Patient has blurry vision in her right eye, continue to follow-up with her eye doctor  Paperwork filled out, [employer]  We will see her back in 2 months or p r n  Problem List Items Addressed This Visit        Cardiovascular and Mediastinum    Acute ischemic left middle cerebral artery (MCA) stroke (HCC) - Primary      Other Visit Diagnoses     BMI 27 0-27 9,adult               Diagnoses and all orders for this visit:    Acute ischemic left middle cerebral artery (MCA) stroke (HCC)    BMI 27 0-27 9,adult        No problem-specific Assessment & Plan notes found for this encounter  Subjective:      Patient ID: Gisella Pike is a 62 y o  female  Patient here today for follow-up on her left middle cerebral artery stroke  Patient continues to struggle with her right hand  Cannot lift more than 5 lb at physical therapy  Has a lot of trouble flexing her right 2nd finger  Also her right leg gets weak especially when she is tired  Patient also has blurry vision in her right eye that has not improved  Cerebrovascular Accident   This is a chronic problem  The current episode started more than 1 month ago  The problem occurs constantly  The problem has been unchanged  Associated symptoms include weakness  Pertinent negatives include no chills, fatigue or fever  Nothing aggravates the symptoms  Treatments tried: Physical therapy  The treatment provided mild relief  The following portions of the patient's history were reviewed and updated as appropriate:   She has a past medical history of Bladder stone, Hyperlipidemia, Stroke ([address] ), and Ureteral calculus, right ,  does not have any pertinent problems on file  ,   has a past surgical history that includes Gallbladder surgery  ,  family history includes Cancer in her maternal grandmother; No Known Problems in her maternal grandfather, paternal grandfather, paternal grandmother, sister, and sister; Stomach cancer in her maternal grandmother; Stroke in her mother  ,   reports that she has never smoked  She has never used smokeless tobacco  She reports that she drinks alcohol  She reports that she does not use drugs  ,  is allergic to penicillins     Current Outpatient Medications   Medication Sig Dispense Refill    ascorbic acid (VITAMIN C) 500 mg tablet Take 500 mg by mouth daily      aspirin (ECOTRIN LOW STRENGTH) 81 mg EC tablet Take 1 tablet (81 mg total) by mouth daily 90 tablet 0    CRANBERRY PO Take 168 mg by mouth      Dextromethorphan-guaiFENesin (MUCINEX DM PO) Take by mouth      Lactobacillus (ACIDOPHILUS PO) Take by mouth      lovastatin (MEVACOR) 40 MG tablet Take 1 tablet by mouth once daily 90 tablet 3    Multiple Vitamins-Minerals (CENTRUM PO) Take by mouth      omeprazole (PriLOSEC) 20 mg delayed release capsule Take 20 mg by mouth daily      VITAMIN E PO Take 184 mg by mouth      clopidogrel (PLAVIX) 75 mg tablet Take 1 tablet (75 mg total) by mouth daily for 20 doses (Patient not taking: Reported on 8/23/2019) 20 tablet 0     No current facility-administered medications for this visit  Review of Systems   Constitutional: Negative  Negative for chills, fatigue and fever  Respiratory: Negative  Cardiovascular: Negative  Gastrointestinal: Negative  Genitourinary: Negative  Neurological: Positive for weakness  As per HPI         Objective:  Vitals:    09/30/19 0734   BP: 124/82   Weight: 67 kg (147 lb 9 6 oz)   Height: 5' 1" (1 549 m)     Body mass index is 27 89 kg/m²  Physical Exam   Constitutional: She is oriented to person, place, and time  She appears well-developed and well-nourished  No distress  HENT:   Head: Normocephalic and atraumatic  Eyes: Conjunctivae are normal    Cardiovascular: Normal rate, regular rhythm and normal heart sounds   Exam reveals no gallop and no friction rub  No murmur heard  Pulmonary/Chest: Effort normal and breath sounds normal  No respiratory distress  She has no wheezes  She has no rales  Musculoskeletal: She exhibits no edema  Decreased strength in the right hand especially in her 2nd finger   Neurological: She is alert and oriented to person, place, and time  Skin: She is not diaphoretic  Psychiatric: She has a normal mood and affect  Her behavior is normal  Judgment and thought content normal    Vitals reviewed  BMI Counseling: Body mass index is 27 89 kg/m²  The BMI is above normal  Nutrition recommendations include reducing portion sizes, consuming healthier snacks, moderation in carbohydrate intake, increasing intake of lean protein, reducing intake of saturated fat and trans fat and reducing intake of cholesterol

## 2019-10-03 ENCOUNTER — OFFICE VISIT (OUTPATIENT)
Dept: PHYSICAL THERAPY | Facility: CLINIC | Age: 59
End: 2019-10-03
Payer: COMMERCIAL

## 2019-10-03 DIAGNOSIS — I63.512 ACUTE ISCHEMIC LEFT MIDDLE CEREBRAL ARTERY (MCA) STROKE (HCC): Primary | ICD-10-CM

## 2019-10-03 PROCEDURE — 97112 NEUROMUSCULAR REEDUCATION: CPT

## 2019-10-03 PROCEDURE — 97530 THERAPEUTIC ACTIVITIES: CPT

## 2019-10-03 PROCEDURE — 97110 THERAPEUTIC EXERCISES: CPT

## 2019-10-03 NOTE — PROGRESS NOTES
Daily Note     Today's date: 10/3/2019  Patient name: Walda Rinne  : 1960  MRN: [id number]  Referring provider: Marino Resendez  Dx:   Encounter Diagnosis     ICD-10-CM    1  Acute ischemic left middle cerebral artery (MCA) stroke (HCC) I63 512                   Subjective: Pt reports increased activity at home      Objective: Wrist/Hand Comments  AROM right hand -grossly WFL all ranges  Strength-  II- ; 3 MICHAEL- 9/10; Key-10/10                  Dynamometer- biceps-R/L-30/35 @ 86%; Shoulder FE- 30/38 @81%  Sensation- intact to LT all digits  Coordination- impaired for FMS-3 minute  assembly- R/L- @105%   See treatment diary below      Assessment: Tolerated treatment well  Pt continues to perform program with good tolerance and endurance  Plan: Progress treatment as tolerated         Precautions: avoid unsafe lifting, activity for strength consideration    Manual  9/3 9/5 9/9  9/16  9/19 9/23 9/26 9/30 10/3    TP HEP        Y TP    Theraband HEP                                           9/23 9/26 9/30 10/3 9/3 9/5 9/9 9/12 9/16 9/19   TP  Y 3' O 2' O 2'  O 2'  Y 3'  Y 3'  Y 3'  Y 3'  Y 3'  Y 3'   5 finger Y 2/10 O 2/10 O 2/10  O 2/10  Y  2/10  Y  2/10  Y 2/10  Y 2/10  Y  2/10  Y 2/10   Key Y 2/10 O 2/10 O2/10  O 2/10  Y  2/10  Y  2/10  Y  2/10  Y  2/10  Y  2/10  Y 2/10   Jerry 30 2/10 35# 2/10 35# 2/10  35 2/10  25  2/10  25  2/10  25  2/10  25  2/10  25  2/10  30 2/10   Blue V 2/10 V 2/10 V  2/10  V 2/10  V  2/10  IV  2/10 V  2/10  IV  2/10  V  2/10  V 2/10                           DB E/F 5 2/10 5# 2/10 5# 2/10 5# 2/10  5  2/10  5  2/10  5  2/10  4  2/10 4   2/10  5 2/10   S/P 2 210 2 2/10 2 2/10 2# 2/10   1 5  2/10  1 5  2/10  1 5  2/10  1 5  2/10  #2  2/10 2 2/10   Nibs Y 16 Y 16' Y 16  Y 16  Y 16 NP  T 14  T 14  Y 16  Y 16x   Zottmans 5 2/10  On Foam 5# 2/10  On Foam 5# 2/10 5# 2/10  5  2/10  3  2/10  On Foam  3  2/10 Foam 4  2/10  On Foam  4  2/10  On Foam  4 2/10  On Foam FF/Scap 5 2/10  On Foam 5# 2/10  On Foam 5# 2/10 5# 2/10  5  2/10  3  2/10  On Foam  3  2/10 foam 4  2/10  On Foam  4  2/10  On Foam  4 2/10  On Foam   MTP/LTP  D1  D2  ER  IR  Bicep  II  II  II  II  II  II  II  II  II  II  II II  II  II  II  II  II  II   I 15x     15x   each  I 2/10 I 2/10 each  I 2/10  I 2/10  1 2/10  ER 2/10 II  II  II  II  2/10 each   Crate Lift     5#  Shelf<>shelf 5# 15x  5#  Shelf<>Shelf  NV  5#  Chair<>Table  Floor<>Chair  NP  NP  5#  shelf<>shelf  5#  Shelf<>shelf  5#  shelf<>shelf   Cursive Writing        1x  NP    NP  NP     Plastic Twiting      3' 3X                Flori Assembly Fullassembly  3'--R- 21, L-20     NP   NP  NP  NP   NP     Comp  Y 3' Y 2' Y 3'  Y 3'  Y 3'  Y 3'  Y 3'  Y 3'  Y 3'  Y 3'   UBE  4/5  3/3 3/3 5/5  4/4  NP  4/4  NP  4/4     Nu-step  L5 7'  L5 12' L 3 10' L3 10'  L3 6'10"  L3  10'  L3 6'  L4  10'  L4  10'  L5 10''   Nips (Pink)     3'  3'               Pegs     3 Rows  3R  3x10" 3x1

## 2019-10-04 NOTE — TELEPHONE ENCOUNTER
Received call from patient asking if we have received any notification from disability, made her aware that we did not  She asked if we could write a letter supporting the light duty, but then stated that her work will not allow her to work light duty  Pt stated that she is going to talk to her work to find out what the best course of action is, and that she will be in touch with us

## 2019-10-07 ENCOUNTER — OFFICE VISIT (OUTPATIENT)
Dept: PHYSICAL THERAPY | Facility: CLINIC | Age: 59
End: 2019-10-07
Payer: COMMERCIAL

## 2019-10-07 DIAGNOSIS — I63.512 ACUTE ISCHEMIC LEFT MIDDLE CEREBRAL ARTERY (MCA) STROKE (HCC): Primary | ICD-10-CM

## 2019-10-07 DIAGNOSIS — E78.49 OTHER HYPERLIPIDEMIA: ICD-10-CM

## 2019-10-07 PROCEDURE — 97110 THERAPEUTIC EXERCISES: CPT

## 2019-10-07 PROCEDURE — 97530 THERAPEUTIC ACTIVITIES: CPT

## 2019-10-07 PROCEDURE — 97112 NEUROMUSCULAR REEDUCATION: CPT

## 2019-10-07 NOTE — PROGRESS NOTES
Daily Note     Today's date: 10/7/2019  Patient name: Kianna Martin  : 1960  MRN: [id number]  Referring provider: Genna Beyer  Dx:   Encounter Diagnosis     ICD-10-CM    1  Acute ischemic left middle cerebral artery (MCA) stroke (HCC) I63 512    2  Other hyperlipidemia E78 49                   Subjective: Pt  reports she had a phone call that her FMLA was ending on Thursday  Pt  unsure if she will be able to continue therapy  Pt  notes she made cookies over the weekend and her hand got a good workout  Objective: Wrist/Hand Comments  AROM right hand -grossly WFL all ranges  Strength-  II- ; 3 MICHAEL- 9/10; Key-10/10                  Dynamometer- biceps-R/L-30/35 @ 86%; Shoulder FE- 30/38 @81%  Sensation- intact to LT all digits  Coordination- impaired for FMS-3 minute  assembly- R/L-21/20 @105%   See treatment diary below        Assessment: Tolerated treatment well  Patient exhibited good technique with therapeutic exercises and would benefit from continued PT  Pt  with good activity estephanie with POC this day  Pt  with improved placement of IF t/o RX session  Plan: Progress treatment as tolerated         Precautions: avoid unsafe lifting, activity for strength consideration    Manual  9/3 9/5 9/9  9/16  9/19 9/23 9/26 9/30 10/3 10/7   TP HEP        Y TP    Theraband HEP                                           9/23 9/26 9/30 10/3 10/7 9/5 9/9 9/12 9/16 9/19   TP  Y 3' O 2' O 2'  O 2'  O 3'  Y 3'  Y 3'  Y 3'  Y 3'  Y 3'   5 finger Y 2/10 O 2/10 O 2/10  O 2/10  O  2/10  Y  2/10  Y 2/10  Y 2/10  Y  2/10  Y 2/10   Key Y 2/10 O 2/10 O2/10  O 2/10  O  2/10  Y  2/10  Y  2/10  Y  2/10  Y  2/10  Y 2/10   Jerry 30 2/10 35# 2/10 35# 2/10  35 2/10  35  2/10  25  2/10  25  2/10  25  2/10  25  2/10  30 2/10   Blue V 2/10 V 2/10 V  2/10  V 2/10  V  2/10  IV  2/10 V  2/10  IV  2/10  V  2/10  V /10                           DB E/F 5 2/10 5# 2/10 5# 2/10 5# 2/10  5  2/10  5  2/10  5  2/10  4  2/10 4 2/10  5 2/10   S/P 2 210 2 2/10 2 2/10 2# 2/10  2  2/10  1 5  2/10  1 5  2/10  1 5  2/10  #2  2/10 2 2/10   Nibs Y 12 Y 16' Y 16  Y 16  NP  T 14  T 14  Y 16  Y 16x   Zottmans 5 2/10  On Foam 5# 2/10  On Foam 5# 2/10 5# 2/10  5  2/10  On Foam  3  2/10  On Foam  3  2/10 Foam 4  2/10  On Foam  4  2/10  On Foam  4 2/10  On Foam   FF/Scap 5 2/10  On Foam 5# 2/10  On Foam 5# 2/10 5# 2/10  5  2/10  On Foam  3  2/10  On Foam  3  2/10 foam 4  2/10  On Foam  4  2/10  On Foam  4 2/10  On Foam   MTP/LTP  D1  D2  ER  IR  Bicep  II  II  II  II  II  II  II  II  II  II  II II  II  II  II  II  II  II II  II  II  II  II  I I 15x     15x   each  I 2/10 I 2/10 each  I 2/10  I 2/10  1 2/10  ER 2/10 II  II  II  II  2/10 each   Crate Lift     5#  Shelf<>shelf 5# 15x  5#  Shelf<>Shelf  NV  5#  Shelf<>[id number]  NP  NP  5#  shelf<>shelf  5#  Shelf<>shelf  5#  shelf<>shelf   Cursive Writing         NP    NP  NP     Plastic Twisting      3' 3X   3'             Flori Assembly Fullassembly  3'--R- 21, L-20     NP   NP  NP  NP   NP     Comp  Y 1' Y 2' Y 3'  Y 3'  Y 3'  Y 3'  Y 3'  Y 3'  Y 3'  Y 3'   UBE  4/5  3/3 3/3 5/5  5/5  NP  4/4  NP  4/4     Nu-step  L5 7'  L5 12' L 5 10' L3 8'  L5 7'  L3  10'  L3 6'  L4  10'  L4  10'  L5 10''   Nips Yassine Ray)     3'  3'  3'             Pegs     3 Rows  3R 3R 3x1

## 2019-10-10 ENCOUNTER — OFFICE VISIT (OUTPATIENT)
Dept: PHYSICAL THERAPY | Facility: CLINIC | Age: 59
End: 2019-10-10
Payer: COMMERCIAL

## 2019-10-10 DIAGNOSIS — I63.512 ACUTE ISCHEMIC LEFT MIDDLE CEREBRAL ARTERY (MCA) STROKE (HCC): Primary | ICD-10-CM

## 2019-10-10 PROCEDURE — 97112 NEUROMUSCULAR REEDUCATION: CPT

## 2019-10-10 PROCEDURE — 97110 THERAPEUTIC EXERCISES: CPT

## 2019-10-10 PROCEDURE — 97530 THERAPEUTIC ACTIVITIES: CPT

## 2019-10-10 NOTE — PROGRESS NOTES
Daily Note     Today's date: 10/10/2019  Patient name: Shyam De La Rosa  : 1960  MRN: [id number]  Referring provider: Zaira Cruz  Dx:   Encounter Diagnosis     ICD-10-CM    1  Acute ischemic left middle cerebral artery (MCA) stroke (HCC) K04 544                   Subjective: Pt reports that picking up loose change is her biggest challenge  Objective:  Wrist/Hand Comments  AROM right hand -grossly WFL all ranges  Strength-  II- ; 3 MICHAEL- 9/10; Key-10/10                  Dynamometer- biceps-R/L-30/35 @ 86%; Shoulder FE- 3038 @81%  Sensation- intact to LT all digits  Coordination- impaired for FMS-3 minute  assembly- R/L- @105%   See treatment diary below      Assessment: Tolerated treatment well today  Pt with full program and appropriate for diagnosis  Pt does need verbal cues to complete exercises  Endurance is improved for patient as she does not display fatigue  Also exercises on unstable surfaces are less challenging for patient  Plan: Progress treatment as tolerated         Precautions: avoid unsafe lifting, activity for strength consideration    Manual  10/10 9/5 9/9  9/16  9/19 9/23 9/26 9/30 10/3 10/7   TP HEP        Y TP    Theraband HEP                                           10/10 9/26 9/30 10/3 10/7 9/5 9/9 9/12 9/16 9/19   TP  O 3'     O 2' O 2'  O 2'  O 3'  Y 3'  Y 3'  Y 3'  Y 3'  Y 3'   5 finger O 2/10 O 2/10 O 2/10  O 2/10  O  2/10  Y  2/10  Y 2/10  Y 2/10  Y  2/10  Y 2/10   Key O 2/10 O 2/10 O2/10  O 2/10  O  2/10  Y  2/10  Y  2/10  Y  2/10  Y  2/10  Y 2/10   Jerry 30 2/10 35# 2/10 35# 2/10  35 2/10  35  2/10  25  2/10  25  2/10  25  2/10  25  2/10  30 2/10   Blue V 2/10 V 2/10 V  2/10  V 2/10  V  2/10  IV  2/10 V  2/10  IV  2/10  V  2/10  V 2/10                           DB E/F 5 2/10 5# 2/10 5# 2/10 5# 2/10  5  2/10  5  2/10  5  2/10  4  2/10 4   2/10  5 2/10   S/P 2 210 2 2/10 2 2/10 2# 2/10  2  2/10  1 5  2/10  1 5  2/10  1 5  2/10  #2  2/10 2 2/10 Nibs  Y 16' Y 16  Y 16  NP  T 14  T 14  Y 16  Y 16x   Zottmans 5 2/10  On Foam 5# 2/10  On Foam 5# 2/10 5# 2/10  5  2/10  On Foam  3  2/10  On Foam  3  2/10 Foam 4  2/10  On Foam  4  2/10  On Foam  4 2/10  On Foam   FF/Scap 5 2/10  On Foam 5# 2/10  On Foam 5# 2/10 5# 2/10  5  2/10  On Foam  3  2/10  On Foam  3  2/10 foam 4  2/10  On Foam  4  2/10  On Foam  4 2/10  On Foam   MTP/LTP  D1  D2  ER  IR  Bicep  II  II  II  II  II  III  II  II  II  II  II  II II  II  II  II  II  II  II II  II  II  II  II  I I 15x     15x   each  I 2/10 I 2/10 each  I 2/10  I 2/10  1 2/10  ER 2/10 II  II  II  II  2/10 each   Crate Lift     5#  Shelf<>shelf 5# 15x  5#  Shelf<>Shelf  NV  5#  Shelf<>[id number]  NP  NP  5#  shelf<>shelf  5#  Shelf<>shelf  5#  shelf<>shelf   Cursive Writing         NP    NP  NP     Plastic Twisting      3' 3X   3'             Flori Assembly      NP   NP  NP  NP   NP     Comp  Y 1' Y 2' Y 3'  Y 3'  Y 3'  Y 3'  Y 3'  Y 3'  Y 3'  Y 3'   UBE  4/4  3/3 3/3 5/5  5/5  NP  4/4  NP  4/4     Nu-step  L5 10  L5 12' L 5 10' L3 10'  L5 7'  L3  10'  L3 6'  L4  10'  L4  10'  L5 10''   Nips Cheng Brink)  3'   3'  3'  3'             Plastic Twist  ALL 1x   3 Rows  3R 3R 3x1

## 2019-11-26 ENCOUNTER — REMOTE DEVICE CLINIC VISIT (OUTPATIENT)
Dept: CARDIOLOGY CLINIC | Facility: CLINIC | Age: 59
End: 2019-11-26

## 2019-11-26 DIAGNOSIS — Z95.818 PRESENCE OF OTHER CARDIAC IMPLANTS AND GRAFTS: Primary | ICD-10-CM

## 2019-11-26 PROCEDURE — 93299 PR REM INTERROG ICPMS/SCRMS <30 D TECH REVIEW: CPT | Performed by: INTERNAL MEDICINE

## 2019-11-26 PROCEDURE — 93298 REM INTERROG DEV EVAL SCRMS: CPT | Performed by: INTERNAL MEDICINE

## 2019-11-26 NOTE — PROGRESS NOTES
PT Discharge    Today's date: 2019  Patient name: Majo Longo  : 1960  MRN: [id number]  Referring provider: Sandra Craven  Dx:   Encounter Diagnosis     ICD-10-CM    1  Acute ischemic left middle cerebral artery (MCA) stroke (HCC) I63 512        Start Time: 1300  Stop Time: 1430  Total time in clinic (min): 90 minutes    Assessment  Assessment details: Pt is a 60 YO female presenting to PT with pain, decreased AROM, strength and tolerance to activity  Pt would benefit from skilled intervention to address these issues and maximize overall function  Occupation-  at [employer]- off with this injury  Dominant- Right; Involved-Right  Pt has noted increase in strength and dexterity in her right hand and UE  She is completing more tasks at home without assistance, but notes fatigue is a limiter in longer tasks  She cont to avoid heavy lifting  Pt is unable to attend therapy- DC to HEP    Goals  ST  Instruct in safety with loss of strength considerations            2  Increase strength to complete ADL and self care  LT  Increase functional motion and strength for independence with ADL and self care by DC            2  Ability to RTW and recreational activity by DC  Goals partially met due to patient unable to attend therapy    Plan  Patient would benefit from: skilled physical therapy  Planned therapy interventions: home exercise program  Frequency: 2x week  Duration in weeks: 4  Treatment plan discussed with: family        Subjective Evaluation    History of Present Illness  Date of onset: 7/10/2019  Mechanism of injury: Insidious onset with sudden loss of strength, coordination in her Right U and LE   LE has recovered for independent function    RUE with loss of full coordination and strength  Pain  Current pain ratin  At best pain ratin  At worst pain ratin    Social Support  Lives with: spouse    Employment status: not working  Hand dominance: right    Treatments  Current treatment: physical therapy  Patient Goals  Patient goals for therapy: increased strength, independence with ADLs/IADLs, return to sport/leisure activities and return to work          Objective     General Comments:      Wrist/Hand Comments  AROM right hand -grossly WFL all ranges  Strength-  II- 37/45; 3 MICHAEL- 9/11; Key-9/11                  Dynamometer- biceps-R/L-30/35 @ 86%;  Shoulder FE- 30/38 @81%  Sensation- intact to LT all digits  Coordination- impaired for FMS-3 minute  assembly- R/L-21/27 @78%         Precautions: avoid unsafe lifting, activity for strength consideration    Manual  9/3 9/5 9/9  9/16  9/19        TP HEP        Y TP                                               8/19 8/21 8/26 8/29 9/3 9/5 9/9 9/12 9/16 9/19   TP  Y 2' Y 2' Y 2'  Y 2'  Y 3'  Y 3'  Y 3'  Y 3'  Y 3'  Y 3'   5 finger Y 2/10 Y 2/10 Y 2/10  Y 2/10  Y  2/10  Y  2/10  Y 2/10  Y 2/10  Y  2/10  Y 2/10   Key Y 2/10 Y 2/10 Y2/10  Y 2/10  Y  2/10  Y  2/10  Y  2/10  Y  2/10  Y  2/10  Y 2/10   Jerry 25 2/10 25# 2/10 25# 2/10  25 2/10  25  2/10  25  2/10  25  2/10  25  2/10  25  2/10  30 2/10   Blue V 2/10 V 2/10 V  2/10  V 2/10  V  2/10  IV  2/10 V  2/10  IV  2/10  V  2/10  V 2/10                           DB E/F 5 2/10 5# 2/10 5# 2/10 5# 2/10  5  2/10  5  2/10  5  2/10  4  2/10 4   2/10  5 2/10   S/P 1 5210 1 5 2/10 1 5 2/10 1 5# 2/10   1 5  2/10  1 5  2/10  1 5  2/10  1 5  2/10  #2  2/10 2 2/10   Nibs Y 14 Y 16' Y 16  Y 16  Y 16 NP  T 14  T 14  Y 16  Y 16x   Zottmans 5 2/10 5# 2/10 5# 2/10 5# 2/10  5  2/10  3  2/10  On Foam  3  2/10 Foam 4  2/10  On Foam  4  2/10  On Foam  4 2/10  On Foam   FF/Scap 5 2/10 5# 2/10 5# 2/10 5# 2/10  5  2/10  3  2/10  On Foam  3  2/10 foam 4  2/10  On Foam  4  2/10  On Foam  4 2/10  On Foam   MTP/LTP  D1  D2  ER           I 15x     15x   each  I 2/10 I 2/10 each  I 2/10  I 2/10  1 2/10  ER 2/10 II  II  II  II  2/10 each   Crate Lift         5#  10x each  NV  5#  Chair<>Table  Floor<>Chair  NP  NP  5#  shelf<>shelf  5#  Shelf<>shelf  5#  shelf<>shelf   Cursive Writing      1x  1x  1x  NP    NP  NP                             Flori Assembly Fullassembly  3'--R- 21 3' R 20 5, L 21    NP  2' 19x NP  NP  NP   NP     Comp  Y 1' Y 2' Y 3'  Y 3'  Y 3'  Y 3'  Y 3'  Y 3'  Y 3'  Y 3'   UBE  4/4  4/4  4/4 5/5  4/4  NP  4/4  NP  4/4     Nu-step     L 3 5' L3 5'  L3 6'10"  L3  10'  L3 6'  L4  10'  L4  10'  L5 10''                                      3x10" 3x1

## 2019-11-26 NOTE — PROGRESS NOTES
MDT-LOOP RECORDER  CARELINK TRANSMISSION: LOOP RECORDER  PRESENTING RHYTHM NSR @ 81 BPM  BATTERY STATUS "OK"  NO PATIENT OR DEVICE ACTIVATED EPISODES  NORMAL DEVICE FUNCTION   DL

## 2019-12-30 ENCOUNTER — OFFICE VISIT (OUTPATIENT)
Dept: FAMILY MEDICINE CLINIC | Facility: CLINIC | Age: 59
End: 2019-12-30
Payer: COMMERCIAL

## 2019-12-30 VITALS
BODY MASS INDEX: 29.23 KG/M2 | WEIGHT: 154.8 LBS | DIASTOLIC BLOOD PRESSURE: 82 MMHG | SYSTOLIC BLOOD PRESSURE: 122 MMHG | HEIGHT: 61 IN

## 2019-12-30 DIAGNOSIS — Z12.11 SCREENING FOR COLON CANCER: ICD-10-CM

## 2019-12-30 DIAGNOSIS — E78.5 HYPERLIPIDEMIA, UNSPECIFIED HYPERLIPIDEMIA TYPE: ICD-10-CM

## 2019-12-30 DIAGNOSIS — E78.49 OTHER HYPERLIPIDEMIA: ICD-10-CM

## 2019-12-30 DIAGNOSIS — I63.512 ACUTE ISCHEMIC LEFT MIDDLE CEREBRAL ARTERY (MCA) STROKE (HCC): Primary | ICD-10-CM

## 2019-12-30 PROCEDURE — 99213 OFFICE O/P EST LOW 20 MIN: CPT | Performed by: FAMILY MEDICINE

## 2019-12-30 PROCEDURE — 3008F BODY MASS INDEX DOCD: CPT | Performed by: FAMILY MEDICINE

## 2019-12-30 PROCEDURE — 1036F TOBACCO NON-USER: CPT | Performed by: FAMILY MEDICINE

## 2019-12-30 RX ORDER — LOVASTATIN 40 MG/1
TABLET ORAL
Qty: 90 TABLET | Refills: 3 | Status: SHIPPED | OUTPATIENT
Start: 2019-12-30 | End: 2020-12-02

## 2019-12-30 NOTE — PROGRESS NOTES
Assessment/Plan:  Continue same medications  We will see her back in 4 months, getting blood work before that visit  Problem List Items Addressed This Visit        Cardiovascular and Mediastinum    Acute ischemic left middle cerebral artery (MCA) stroke (HCC) - Primary    Relevant Orders    Comprehensive metabolic panel    CBC and differential    Lipid Panel with Direct LDL reflex       Other    Other hyperlipidemia    Relevant Orders    Comprehensive metabolic panel    CBC and differential    Lipid Panel with Direct LDL reflex      Other Visit Diagnoses     Screening for colon cancer        Relevant Orders    Occult Blood, Fecal Immunochemical           Diagnoses and all orders for this visit:    Acute ischemic left middle cerebral artery (MCA) stroke (HCC)  -     Comprehensive metabolic panel  -     CBC and differential  -     Lipid Panel with Direct LDL reflex    Other hyperlipidemia  -     Comprehensive metabolic panel  -     CBC and differential  -     Lipid Panel with Direct LDL reflex    Screening for colon cancer  -     Occult Blood, Fecal Immunochemical; Future        No problem-specific Assessment & Plan notes found for this encounter  Subjective:      Patient ID: Samy Sin is a 62 y o  female  Patient here today for follow-up  Patient denies any chest pain or shortness of breath  Patient got approved for disability  Patient still has weakness in her right hand and decreased vision in her right eye      The following portions of the patient's history were reviewed and updated as appropriate:   She has a past medical history of Bladder stone, Hyperlipidemia, Stroke ([address] ), and Ureteral calculus, right ,  does not have any pertinent problems on file  ,   has a past surgical history that includes Gallbladder surgery  ,  family history includes Cancer in her maternal grandmother; No Known Problems in her maternal grandfather, paternal grandfather, paternal grandmother, sister, and sister; Stomach cancer in her maternal grandmother; Stroke in her mother  ,   reports that she has never smoked  She has never used smokeless tobacco  She reports that she drinks alcohol  She reports that she does not use drugs  ,  is allergic to penicillins     Current Outpatient Medications   Medication Sig Dispense Refill    ascorbic acid (VITAMIN C) 500 mg tablet Take 500 mg by mouth daily      aspirin (ECOTRIN LOW STRENGTH) 81 mg EC tablet Take 1 tablet (81 mg total) by mouth daily 90 tablet 0    CRANBERRY PO Take 168 mg by mouth      Dextromethorphan-guaiFENesin (MUCINEX DM PO) Take by mouth      Lactobacillus (ACIDOPHILUS PO) Take by mouth      Multiple Vitamins-Minerals (CENTRUM PO) Take by mouth      omeprazole (PriLOSEC) 20 mg delayed release capsule Take 20 mg by mouth daily      VITAMIN E PO Take 184 mg by mouth      clopidogrel (PLAVIX) 75 mg tablet Take 1 tablet (75 mg total) by mouth daily for 20 doses (Patient not taking: Reported on 8/23/2019) 20 tablet 0    lovastatin (MEVACOR) 40 MG tablet Take 1 tablet by mouth once daily 90 tablet 3     No current facility-administered medications for this visit  Review of Systems   Constitutional: Negative  Respiratory: Negative  Cardiovascular: Negative  Gastrointestinal: Negative  Genitourinary: Negative  Objective:  Vitals:    12/30/19 0851   BP: 122/82   Weight: 70 2 kg (154 lb 12 8 oz)   Height: 5' 1" (1 549 m)     Body mass index is 29 25 kg/m²  Physical Exam   Constitutional: She is oriented to person, place, and time  She appears well-developed and well-nourished  No distress  HENT:   Head: Normocephalic and atraumatic  Eyes: Conjunctivae are normal    Cardiovascular: Normal rate, regular rhythm and normal heart sounds  Exam reveals no gallop and no friction rub  No murmur heard  Pulmonary/Chest: Effort normal and breath sounds normal  No respiratory distress  She has no wheezes  She has no rales     Musculoskeletal: She exhibits no edema  Neurological: She is alert and oriented to person, place, and time  Skin: She is not diaphoretic  Psychiatric: She has a normal mood and affect  Her behavior is normal  Judgment and thought content normal    Vitals reviewed

## 2020-01-03 ENCOUNTER — TELEPHONE (OUTPATIENT)
Dept: NEUROLOGY | Facility: CLINIC | Age: 60
End: 2020-01-03

## 2020-01-29 ENCOUNTER — OFFICE VISIT (OUTPATIENT)
Dept: CARDIOLOGY CLINIC | Facility: CLINIC | Age: 60
End: 2020-01-29
Payer: COMMERCIAL

## 2020-01-29 VITALS
SYSTOLIC BLOOD PRESSURE: 110 MMHG | BODY MASS INDEX: 28.89 KG/M2 | OXYGEN SATURATION: 97 % | HEIGHT: 61 IN | DIASTOLIC BLOOD PRESSURE: 80 MMHG | HEART RATE: 77 BPM | WEIGHT: 153 LBS

## 2020-01-29 DIAGNOSIS — I63.519 CEREBROVASCULAR ACCIDENT (CVA) DUE TO STENOSIS OF MIDDLE CEREBRAL ARTERY, UNSPECIFIED BLOOD VESSEL LATERALITY (HCC): Primary | ICD-10-CM

## 2020-01-29 PROCEDURE — 99214 OFFICE O/P EST MOD 30 MIN: CPT | Performed by: INTERNAL MEDICINE

## 2020-01-29 NOTE — PROGRESS NOTES
Cardiology Follow Up        Aquilino Bahena [facility]      1960      [id number]      Discussion/Summary:    1  CVA 07/11/2019 status post tPA administration, status post loop recorder implantation 08/2019  2  Dyslipidemia    · Loop recorder interrogations reviewed, within normal limits  · Prior lipid panel reviewed from 2019, LDL cholesterol seems to be suboptimal   She has had myalgias on more potent statins, but does not recall exactly which 1  She is scheduled for repeat lipid panel  I requested she call me after completion or discussed with her PCP about potentially trying a more potent statin to get her LDL cholesterol below 70 mg/dL, at the least below 100 mg/dL  She is agreeable  · Have offered her JOHN PAUL, as this was never done as part of her workup for cryptogenic stroke  Explained to her the rationale of the same  At this time she politely declines, stating that she will consider this if she has any  further issues      Follow-up in 1 year        Interval History: This is a very pleasant 64-year-old female hospitalized 07/2019 with acute right-sided weakness  She was administered tPA as per stroke protocol  MRI brain revealed small residual area of ischemia within the MCA territory not salvaged by tPA  Echocardiogram was unremarkable  She underwent loop recorder implantation 08/06/2019  She was last seen by Neurology 08/2019  Aspirin and lovastatin were both continued  Prior lipid panel 07/2018 revealed LDL cholesterol 115 mg/dL, HDL 72 mg/dL  She presents today for follow-up  From a symptomatic standpoint, she feels well without exertional chest pain, shortness of breath, dizziness  She has had no recurrent focal neurologic deficits  She does admit to myalgias with more potent statins             Vitals:  Vitals:    01/29/20 0842   BP: 110/80   BP Location: Left arm   Patient Position: Sitting   Cuff Size: Adult   Pulse: 77   SpO2: 97% Weight: 69 4 kg (153 lb)   Height: 5' 1" (1 549 m)         Past Medical History:   Diagnosis Date    Bladder stone     Hyperlipidemia     Stroke ([address] )     Left hand    Ureteral calculus, right      Social History     Socioeconomic History    Marital status: /Civil Union     Spouse name: Not on file    Number of children: Not on file    Years of education: Not on file    Highest education level: Not on file   Occupational History    Not on file   Social Needs    Financial resource strain: Not on file    Food insecurity:     Worry: Not on file     Inability: Not on file    Transportation needs:     Medical: Not on file     Non-medical: Not on file   Tobacco Use    Smoking status: Never Smoker    Smokeless tobacco: Never Used   Substance and Sexual Activity    Alcohol use: Yes     Frequency: Monthly or less     Drinks per session: 1 or 2     Binge frequency: Never     Comment: social    Drug use: Never    Sexual activity: Yes     Partners: Male     Birth control/protection: Post-menopausal   Lifestyle    Physical activity:     Days per week: Not on file     Minutes per session: Not on file    Stress: Not on file   Relationships    Social connections:     Talks on phone: Not on file     Gets together: Not on file     Attends [employer] service: Not on file     Active member of club or organization: Not on file     Attends meetings of clubs or organizations: Not on file     Relationship status: Not on file    Intimate partner violence:     Fear of current or ex partner: Not on file     Emotionally abused: Not on file     Physically abused: Not on file     Forced sexual activity: Not on file   Other Topics Concern    Not on file   Social History Narrative    No living will      Family History   Problem Relation Age of Onset    Stroke Mother         stroke syndrome    Cancer Maternal Grandmother     Stomach cancer Maternal Grandmother     No Known Problems Sister     No Known Problems Maternal Grandfather     No Known Problems Paternal Grandmother     No Known Problems Paternal Grandfather     No Known Problems Sister      Past Surgical History:   Procedure Laterality Date    GALLBLADDER SURGERY      onset: 2011       Current Outpatient Medications:     ascorbic acid (VITAMIN C) 500 mg tablet, Take 500 mg by mouth daily, Disp: , Rfl:     aspirin (ECOTRIN LOW STRENGTH) 81 mg EC tablet, Take 1 tablet (81 mg total) by mouth daily, Disp: 90 tablet, Rfl: 0    CRANBERRY PO, Take 168 mg by mouth, Disp: , Rfl:     Dextromethorphan-guaiFENesin (MUCINEX DM PO), Take by mouth, Disp: , Rfl:     Lactobacillus (ACIDOPHILUS PO), Take by mouth, Disp: , Rfl:     lovastatin (MEVACOR) 40 MG tablet, Take 1 tablet by mouth once daily, Disp: 90 tablet, Rfl: 3    Multiple Vitamins-Minerals (CENTRUM PO), Take by mouth, Disp: , Rfl:     omeprazole (PriLOSEC) 20 mg delayed release capsule, Take 20 mg by mouth daily, Disp: , Rfl:     VITAMIN E PO, Take 184 mg by mouth, Disp: , Rfl:     clopidogrel (PLAVIX) 75 mg tablet, Take 1 tablet (75 mg total) by mouth daily for 20 doses (Patient not taking: Reported on 8/23/2019), Disp: 20 tablet, Rfl: 0        Review of Systems:  Review of Systems   Constitutional: Negative for activity change, fever and unexpected weight change  HENT: Negative for facial swelling, nosebleeds and voice change  Respiratory: Negative for chest tightness, shortness of breath and wheezing  Cardiovascular: Negative for chest pain, palpitations and leg swelling  Gastrointestinal: Negative for abdominal distention  Genitourinary: Negative for hematuria  Musculoskeletal: Negative for arthralgias  Skin: Negative for color change, pallor, rash and wound  Neurological: Negative for dizziness, seizures and syncope  Psychiatric/Behavioral: Negative for agitation  Physical Exam:  Physical Exam   Constitutional: She is oriented to person, place, and time   She appears well-developed and well-nourished  HENT:   Head: Normocephalic and atraumatic  Eyes: EOM are normal    Neck: Normal range of motion  Neck supple  Cardiovascular: Normal rate, regular rhythm, normal heart sounds and intact distal pulses  Pulmonary/Chest: Effort normal and breath sounds normal    Abdominal: Soft  Musculoskeletal: Normal range of motion  Neurological: She is alert and oriented to person, place, and time  Skin: Skin is warm and dry  Psychiatric: She has a normal mood and affect  Her behavior is normal  Judgment and thought content normal    Vitals reviewed  This note was completed in part utilizing [employer] Software  Grammatical errors, random word insertions, spelling mistakes, and incomplete sentences can be an occasional consequence of this system secondary to software limitations, ambient noise, and hardware issues  If you have any questions or concerns about the content, text, or information contained within the body of this dictation, please contact the provider for clarification

## 2020-02-25 ENCOUNTER — REMOTE DEVICE CLINIC VISIT (OUTPATIENT)
Dept: CARDIOLOGY CLINIC | Facility: CLINIC | Age: 60
End: 2020-02-25
Payer: COMMERCIAL

## 2020-02-25 DIAGNOSIS — Z95.818 PRESENCE OF OTHER CARDIAC IMPLANTS AND GRAFTS: Primary | ICD-10-CM

## 2020-02-25 PROCEDURE — G2066 INTER DEVC REMOTE 30D: HCPCS | Performed by: INTERNAL MEDICINE

## 2020-02-25 PROCEDURE — 93298 REM INTERROG DEV EVAL SCRMS: CPT | Performed by: INTERNAL MEDICINE

## 2020-02-26 DIAGNOSIS — R11.0 NAUSEA: Primary | ICD-10-CM

## 2020-02-26 RX ORDER — ONDANSETRON 4 MG/1
4 TABLET, ORALLY DISINTEGRATING ORAL EVERY 6 HOURS PRN
Qty: 20 TABLET | Refills: 0 | Status: SHIPPED | OUTPATIENT
Start: 2020-02-26 | End: 2021-06-02

## 2020-04-17 NOTE — ADDENDUM NOTE
Addended by: Eilleen Ormond on: 9/19/2019 12:47 PM     Modules accepted: Orders chest discomfort/headache

## 2020-04-30 ENCOUNTER — TELEMEDICINE (OUTPATIENT)
Dept: FAMILY MEDICINE CLINIC | Facility: CLINIC | Age: 60
End: 2020-04-30
Payer: COMMERCIAL

## 2020-04-30 VITALS
HEART RATE: 84 BPM | TEMPERATURE: 98.6 F | HEIGHT: 61 IN | BODY MASS INDEX: 27.75 KG/M2 | SYSTOLIC BLOOD PRESSURE: 118 MMHG | DIASTOLIC BLOOD PRESSURE: 83 MMHG | WEIGHT: 147 LBS

## 2020-04-30 DIAGNOSIS — E78.49 OTHER HYPERLIPIDEMIA: Primary | ICD-10-CM

## 2020-04-30 DIAGNOSIS — F41.1 GENERALIZED ANXIETY DISORDER: ICD-10-CM

## 2020-04-30 DIAGNOSIS — I63.512 ACUTE ISCHEMIC LEFT MIDDLE CEREBRAL ARTERY (MCA) STROKE (HCC): ICD-10-CM

## 2020-04-30 PROCEDURE — 99213 OFFICE O/P EST LOW 20 MIN: CPT | Performed by: FAMILY MEDICINE

## 2020-04-30 PROCEDURE — 3008F BODY MASS INDEX DOCD: CPT | Performed by: PSYCHIATRY & NEUROLOGY

## 2020-05-07 ENCOUNTER — TELEPHONE (OUTPATIENT)
Dept: NEUROLOGY | Facility: CLINIC | Age: 60
End: 2020-05-07

## 2020-05-12 ENCOUNTER — TELEMEDICINE (OUTPATIENT)
Dept: NEUROLOGY | Facility: CLINIC | Age: 60
End: 2020-05-12
Payer: COMMERCIAL

## 2020-05-12 ENCOUNTER — TELEPHONE (OUTPATIENT)
Dept: NEUROLOGY | Facility: CLINIC | Age: 60
End: 2020-05-12

## 2020-05-12 DIAGNOSIS — E78.49 OTHER HYPERLIPIDEMIA: ICD-10-CM

## 2020-05-12 DIAGNOSIS — I63.512 ACUTE ISCHEMIC LEFT MIDDLE CEREBRAL ARTERY (MCA) STROKE (HCC): Primary | ICD-10-CM

## 2020-05-12 PROCEDURE — 99214 OFFICE O/P EST MOD 30 MIN: CPT | Performed by: PSYCHIATRY & NEUROLOGY

## 2020-05-26 ENCOUNTER — REMOTE DEVICE CLINIC VISIT (OUTPATIENT)
Dept: CARDIOLOGY CLINIC | Facility: CLINIC | Age: 60
End: 2020-05-26
Payer: COMMERCIAL

## 2020-05-26 DIAGNOSIS — Z95.818 PRESENCE OF OTHER CARDIAC IMPLANTS AND GRAFTS: Primary | ICD-10-CM

## 2020-05-26 PROCEDURE — 93298 REM INTERROG DEV EVAL SCRMS: CPT | Performed by: INTERNAL MEDICINE

## 2020-05-26 PROCEDURE — G2066 INTER DEVC REMOTE 30D: HCPCS | Performed by: INTERNAL MEDICINE

## 2020-06-24 ENCOUNTER — APPOINTMENT (OUTPATIENT)
Dept: LAB | Facility: MEDICAL CENTER | Age: 60
End: 2020-06-24
Payer: COMMERCIAL

## 2020-06-24 LAB
ALBUMIN SERPL BCP-MCNC: 3.8 G/DL (ref 3.5–5)
ALP SERPL-CCNC: 101 U/L (ref 46–116)
ALT SERPL W P-5'-P-CCNC: 23 U/L (ref 12–78)
ANION GAP SERPL CALCULATED.3IONS-SCNC: 8 MMOL/L (ref 4–13)
AST SERPL W P-5'-P-CCNC: 28 U/L (ref 5–45)
BASOPHILS # BLD AUTO: 0.07 THOUSANDS/ΜL (ref 0–0.1)
BASOPHILS NFR BLD AUTO: 1 % (ref 0–1)
BILIRUB SERPL-MCNC: 0.58 MG/DL (ref 0.2–1)
BUN SERPL-MCNC: 15 MG/DL (ref 5–25)
CALCIUM SERPL-MCNC: 9.1 MG/DL (ref 8.3–10.1)
CHLORIDE SERPL-SCNC: 108 MMOL/L (ref 100–108)
CHOLEST SERPL-MCNC: 214 MG/DL (ref 50–200)
CO2 SERPL-SCNC: 23 MMOL/L (ref 21–32)
CREAT SERPL-MCNC: 0.69 MG/DL (ref 0.6–1.3)
EOSINOPHIL # BLD AUTO: 0.26 THOUSAND/ΜL (ref 0–0.61)
EOSINOPHIL NFR BLD AUTO: 4 % (ref 0–6)
ERYTHROCYTE [DISTWIDTH] IN BLOOD BY AUTOMATED COUNT: 13 % (ref 11.6–15.1)
GFR SERPL CREATININE-BSD FRML MDRD: 94 ML/MIN/1.73SQ M
GLUCOSE P FAST SERPL-MCNC: 91 MG/DL (ref 65–99)
HCT VFR BLD AUTO: 43 % (ref 34.8–46.1)
HDLC SERPL-MCNC: 70 MG/DL
HGB BLD-MCNC: 14.1 G/DL (ref 11.5–15.4)
IMM GRANULOCYTES # BLD AUTO: 0.02 THOUSAND/UL (ref 0–0.2)
IMM GRANULOCYTES NFR BLD AUTO: 0 % (ref 0–2)
LDLC SERPL CALC-MCNC: 119 MG/DL (ref 0–100)
LYMPHOCYTES # BLD AUTO: 2.22 THOUSANDS/ΜL (ref 0.6–4.47)
LYMPHOCYTES NFR BLD AUTO: 32 % (ref 14–44)
MCH RBC QN AUTO: 30.3 PG (ref 26.8–34.3)
MCHC RBC AUTO-ENTMCNC: 32.8 G/DL (ref 31.4–37.4)
MCV RBC AUTO: 93 FL (ref 82–98)
MONOCYTES # BLD AUTO: 0.69 THOUSAND/ΜL (ref 0.17–1.22)
MONOCYTES NFR BLD AUTO: 10 % (ref 4–12)
NEUTROPHILS # BLD AUTO: 3.66 THOUSANDS/ΜL (ref 1.85–7.62)
NEUTS SEG NFR BLD AUTO: 53 % (ref 43–75)
NRBC BLD AUTO-RTO: 0 /100 WBCS
PLATELET # BLD AUTO: 278 THOUSANDS/UL (ref 149–390)
PMV BLD AUTO: 9.5 FL (ref 8.9–12.7)
POTASSIUM SERPL-SCNC: 3.8 MMOL/L (ref 3.5–5.3)
PROT SERPL-MCNC: 7.7 G/DL (ref 6.4–8.2)
RBC # BLD AUTO: 4.65 MILLION/UL (ref 3.81–5.12)
SODIUM SERPL-SCNC: 139 MMOL/L (ref 136–145)
TRIGL SERPL-MCNC: 123 MG/DL
WBC # BLD AUTO: 6.92 THOUSAND/UL (ref 4.31–10.16)

## 2020-06-24 PROCEDURE — 36415 COLL VENOUS BLD VENIPUNCTURE: CPT | Performed by: FAMILY MEDICINE

## 2020-06-24 PROCEDURE — 85025 COMPLETE CBC W/AUTO DIFF WBC: CPT | Performed by: FAMILY MEDICINE

## 2020-06-24 PROCEDURE — 80061 LIPID PANEL: CPT | Performed by: FAMILY MEDICINE

## 2020-06-24 PROCEDURE — 80053 COMPREHEN METABOLIC PANEL: CPT | Performed by: FAMILY MEDICINE

## 2020-08-17 ENCOUNTER — TELEPHONE (OUTPATIENT)
Dept: NEUROLOGY | Facility: CLINIC | Age: 60
End: 2020-08-17

## 2020-08-25 ENCOUNTER — REMOTE DEVICE CLINIC VISIT (OUTPATIENT)
Dept: CARDIOLOGY CLINIC | Facility: CLINIC | Age: 60
End: 2020-08-25
Payer: COMMERCIAL

## 2020-08-25 DIAGNOSIS — Z95.818 PRESENCE OF OTHER CARDIAC IMPLANTS AND GRAFTS: Primary | ICD-10-CM

## 2020-08-25 PROCEDURE — G2066 INTER DEVC REMOTE 30D: HCPCS | Performed by: INTERNAL MEDICINE

## 2020-08-25 PROCEDURE — 93298 REM INTERROG DEV EVAL SCRMS: CPT | Performed by: INTERNAL MEDICINE

## 2020-08-25 NOTE — PROGRESS NOTES
MDT-LOOP RECORDER   CARELINK TRANSMISSION: LOOP RECORDER  PRESENTING RHYTHM NSR W/ PACS @ 81 BPM  BATTERY STATUS "OK"  NO PATIENT OR DEVICE ACTIVATED EPISODES  NORMAL DEVICE FUNCTION   DL

## 2020-09-01 ENCOUNTER — OFFICE VISIT (OUTPATIENT)
Dept: FAMILY MEDICINE CLINIC | Facility: CLINIC | Age: 60
End: 2020-09-01
Payer: COMMERCIAL

## 2020-09-01 VITALS
BODY MASS INDEX: 29.91 KG/M2 | DIASTOLIC BLOOD PRESSURE: 76 MMHG | TEMPERATURE: 97.1 F | SYSTOLIC BLOOD PRESSURE: 120 MMHG | HEIGHT: 61 IN | WEIGHT: 158.4 LBS

## 2020-09-01 DIAGNOSIS — Z23 ENCOUNTER FOR IMMUNIZATION: ICD-10-CM

## 2020-09-01 DIAGNOSIS — N39.0 URINARY TRACT INFECTION WITHOUT HEMATURIA, SITE UNSPECIFIED: Primary | ICD-10-CM

## 2020-09-01 LAB
SL AMB  POCT GLUCOSE, UA: ABNORMAL
SL AMB LEUKOCYTE ESTERASE,UA: ABNORMAL
SL AMB POCT BILIRUBIN,UA: ABNORMAL
SL AMB POCT BLOOD,UA: ABNORMAL
SL AMB POCT CLARITY,UA: ABNORMAL
SL AMB POCT COLOR,UA: ABNORMAL
SL AMB POCT KETONES,UA: ABNORMAL
SL AMB POCT NITRITE,UA: ABNORMAL
SL AMB POCT PH,UA: 6
SL AMB POCT SPECIFIC GRAVITY,UA: 1.03
SL AMB POCT URINE PROTEIN: ABNORMAL
SL AMB POCT UROBILINOGEN: 0.2

## 2020-09-01 PROCEDURE — 99213 OFFICE O/P EST LOW 20 MIN: CPT | Performed by: FAMILY MEDICINE

## 2020-09-01 PROCEDURE — 3725F SCREEN DEPRESSION PERFORMED: CPT | Performed by: FAMILY MEDICINE

## 2020-09-01 PROCEDURE — 90471 IMMUNIZATION ADMIN: CPT | Performed by: FAMILY MEDICINE

## 2020-09-01 PROCEDURE — 81002 URINALYSIS NONAUTO W/O SCOPE: CPT | Performed by: FAMILY MEDICINE

## 2020-09-01 PROCEDURE — 90682 RIV4 VACC RECOMBINANT DNA IM: CPT | Performed by: FAMILY MEDICINE

## 2020-09-01 RX ORDER — NITROFURANTOIN 25; 75 MG/1; MG/1
100 CAPSULE ORAL 2 TIMES DAILY
Qty: 10 CAPSULE | Refills: 0 | Status: SHIPPED | OUTPATIENT
Start: 2020-09-01 | End: 2020-09-06

## 2020-09-01 NOTE — PROGRESS NOTES
Assessment/Plan:  Urinalysis shows leukocytes  Rx put in for Macrobid for 5 days  Push fluids  She will call us if symptoms continue or increase  Flu shot given today  Follow-up in 3-4 months or p r n     Problem List Items Addressed This Visit     None      Visit Diagnoses     Urinary tract infection without hematuria, site unspecified    -  Primary    Relevant Medications    nitrofurantoin (MACROBID) 100 mg capsule    Other Relevant Orders    POCT urine dip           Diagnoses and all orders for this visit:    Urinary tract infection without hematuria, site unspecified  -     POCT urine dip  -     nitrofurantoin (MACROBID) 100 mg capsule; Take 1 capsule (100 mg total) by mouth 2 (two) times a day for 5 days        No problem-specific Assessment & Plan notes found for this encounter  Subjective:      Patient ID: Yolanda Guerrero is a 56 y o  female  Patient here today stating that yesterday noticed increased frequency in urination mild burning  No fever chills  No nausea vomiting or diarrhea  The following portions of the patient's history were reviewed and updated as appropriate:   She has a past medical history of Bladder stone, Hyperlipidemia, Stroke ([address] ), and Ureteral calculus, right ,  does not have any pertinent problems on file  ,   has a past surgical history that includes Gallbladder surgery  ,  family history includes Cancer in her maternal grandmother; No Known Problems in her maternal grandfather, paternal grandfather, paternal grandmother, sister, and sister; Stomach cancer in her maternal grandmother; Stroke in her mother  ,   reports that she has never smoked  She has never used smokeless tobacco  She reports current alcohol use  She reports that she does not use drugs  ,  is allergic to penicillin g and penicillins     Current Outpatient Medications   Medication Sig Dispense Refill    ascorbic acid (VITAMIN C) 500 mg tablet Take 500 mg by mouth daily      aspirin (ECOTRIN LOW STRENGTH) 81 mg EC tablet Take 1 tablet (81 mg total) by mouth daily 90 tablet 0    CRANBERRY PO Take 168 mg by mouth      Dextromethorphan-guaiFENesin (MUCINEX DM PO) Take by mouth      Lactobacillus (ACIDOPHILUS PO) Take by mouth      lovastatin (MEVACOR) 40 MG tablet Take 1 tablet by mouth once daily 90 tablet 3    Multiple Vitamins-Minerals (CENTRUM PO) Take by mouth      omeprazole (PriLOSEC) 20 mg delayed release capsule Take 20 mg by mouth daily      ondansetron (ZOFRAN-ODT) 4 mg disintegrating tablet Take 1 tablet (4 mg total) by mouth every 6 (six) hours as needed for nausea or vomiting 20 tablet 0    VITAMIN E PO Take 184 mg by mouth      nitrofurantoin (MACROBID) 100 mg capsule Take 1 capsule (100 mg total) by mouth 2 (two) times a day for 5 days 10 capsule 0     No current facility-administered medications for this visit  Review of Systems   Constitutional: Negative  Respiratory: Negative  Cardiovascular: Negative  Gastrointestinal: Negative  Genitourinary: Positive for dysuria (Mild) and frequency  Objective:  Vitals:    09/01/20 0932   BP: 120/76   Temp: (!) 97 1 °F (36 2 °C)   Weight: 71 8 kg (158 lb 6 4 oz)   Height: 5' 1" (1 549 m)     Body mass index is 29 93 kg/m²  Physical Exam  Vitals signs reviewed  Constitutional:       General: She is not in acute distress  Appearance: She is well-developed  She is not diaphoretic  HENT:      Head: Normocephalic and atraumatic  Eyes:      Conjunctiva/sclera: Conjunctivae normal    Cardiovascular:      Rate and Rhythm: Normal rate and regular rhythm  Heart sounds: Normal heart sounds  No murmur  No friction rub  No gallop  Pulmonary:      Effort: Pulmonary effort is normal  No respiratory distress  Breath sounds: Normal breath sounds  No wheezing or rales  Neurological:      Mental Status: She is alert and oriented to person, place, and time     Psychiatric:         Behavior: Behavior normal  Thought Content:  Thought content normal          Judgment: Judgment normal

## 2020-09-17 ENCOUNTER — APPOINTMENT (OUTPATIENT)
Dept: LAB | Facility: MEDICAL CENTER | Age: 60
End: 2020-09-17
Payer: COMMERCIAL

## 2020-09-17 ENCOUNTER — TELEPHONE (OUTPATIENT)
Dept: FAMILY MEDICINE CLINIC | Facility: CLINIC | Age: 60
End: 2020-09-17

## 2020-09-17 ENCOUNTER — OFFICE VISIT (OUTPATIENT)
Dept: FAMILY MEDICINE CLINIC | Facility: CLINIC | Age: 60
End: 2020-09-17
Payer: COMMERCIAL

## 2020-09-17 VITALS
SYSTOLIC BLOOD PRESSURE: 112 MMHG | HEIGHT: 61 IN | TEMPERATURE: 97.1 F | WEIGHT: 158.8 LBS | DIASTOLIC BLOOD PRESSURE: 70 MMHG | BODY MASS INDEX: 29.98 KG/M2

## 2020-09-17 DIAGNOSIS — Z12.11 COLON CANCER SCREENING: ICD-10-CM

## 2020-09-17 DIAGNOSIS — R15.2 FECAL URGENCY: Primary | ICD-10-CM

## 2020-09-17 DIAGNOSIS — R15.2 FECAL URGENCY: ICD-10-CM

## 2020-09-17 DIAGNOSIS — R39.15 URINARY URGENCY: ICD-10-CM

## 2020-09-17 DIAGNOSIS — R39.89 SENSATION OF PRESSURE IN BLADDER AREA: Primary | ICD-10-CM

## 2020-09-17 DIAGNOSIS — Z90.49 S/P CHOLECYSTECTOMY: ICD-10-CM

## 2020-09-17 LAB
BILIRUB UR QL STRIP: NEGATIVE
CLARITY UR: CLEAR
COLOR UR: YELLOW
GLUCOSE UR STRIP-MCNC: NEGATIVE MG/DL
HGB UR QL STRIP.AUTO: NEGATIVE
KETONES UR STRIP-MCNC: NEGATIVE MG/DL
LEUKOCYTE ESTERASE UR QL STRIP: NEGATIVE
NITRITE UR QL STRIP: NEGATIVE
PH UR STRIP.AUTO: 5.5 [PH]
PROT UR STRIP-MCNC: NEGATIVE MG/DL
SP GR UR STRIP.AUTO: 1.02 (ref 1–1.03)
UROBILINOGEN UR QL STRIP.AUTO: 0.2 E.U./DL

## 2020-09-17 PROCEDURE — 1036F TOBACCO NON-USER: CPT | Performed by: FAMILY MEDICINE

## 2020-09-17 PROCEDURE — 87086 URINE CULTURE/COLONY COUNT: CPT | Performed by: FAMILY MEDICINE

## 2020-09-17 PROCEDURE — 99213 OFFICE O/P EST LOW 20 MIN: CPT | Performed by: FAMILY MEDICINE

## 2020-09-17 PROCEDURE — 81003 URINALYSIS AUTO W/O SCOPE: CPT | Performed by: FAMILY MEDICINE

## 2020-09-17 RX ORDER — COLESEVELAM 180 1/1
625 TABLET ORAL 2 TIMES DAILY WITH MEALS
Qty: 60 TABLET | Refills: 1 | Status: SHIPPED | OUTPATIENT
Start: 2020-09-17 | End: 2020-09-17

## 2020-09-17 RX ORDER — MONTELUKAST SODIUM 4 MG/1
1 TABLET, CHEWABLE ORAL 2 TIMES DAILY
Qty: 60 TABLET | Refills: 3 | Status: SHIPPED | OUTPATIENT
Start: 2020-09-17 | End: 2020-11-11 | Stop reason: SDUPTHER

## 2020-09-17 NOTE — TELEPHONE ENCOUNTER
THE MEDICATION YOU PRESCRIBED IS $31, YOU MENTIONED ANOTHER ONE WHEN SHE WAS HERE  CAN YOU ORDER THAT TO SEE IF IT IS CHEAPER?

## 2020-09-17 NOTE — PROGRESS NOTES
Assessment/Plan: We will get a UA C&S on her today  We will refer her to urology and Colorectal surgery  Rx put in for [facility], 1 pill twice a day  I hope this helps with her stool frequency  She will call us if symptoms continue or increase  She will call us if she has not heard from the specialists by Monday  Problem List Items Addressed This Visit     None      Visit Diagnoses     Sensation of pressure in bladder area    -  Primary    Relevant Orders    Ambulatory referral to Urology    UA (URINE) with reflex to Scope    Urine culture    Urinary urgency        Relevant Orders    Ambulatory referral to Urology    UA (URINE) with reflex to Scope    Urine culture    Fecal urgency        Relevant Medications    colesevelam (WELCHOL) 625 mg tablet    Other Relevant Orders    Ambulatory referral to Colorectal Surgery    S/P cholecystectomy        Relevant Medications    colesevelam (WELCHOL) 625 mg tablet    Colon cancer screening        Relevant Orders    Ambulatory referral to Colorectal Surgery           Diagnoses and all orders for this visit:    Sensation of pressure in bladder area  -     Ambulatory referral to Urology; Future  -     UA (URINE) with reflex to Scope  -     Urine culture    Urinary urgency  -     Ambulatory referral to Urology; Future  -     UA (URINE) with reflex to Scope  -     Urine culture    Fecal urgency  -     colesevelam (WELCHOL) 625 mg tablet; Take 1 tablet (625 mg total) by mouth 2 (two) times a day with meals  -     Ambulatory referral to Colorectal Surgery; Future    S/P cholecystectomy  -     colesevelam (WELCHOL) 625 mg tablet; Take 1 tablet (625 mg total) by mouth 2 (two) times a day with meals    Colon cancer screening  -     Ambulatory referral to Colorectal Surgery; Future        No problem-specific Assessment & Plan notes found for this encounter  Subjective:      Patient ID: Martina Davis is a 56 y o  female  Patient here today for follow-up    Patient still having pressure in her bladder  Feels that she is going more often  No dysuria with it  No fever chills  Patient states in the past was told that she needed bladder surgery due to her bladder falling  She did not want to go through with it at that time  Patient also having pressure in her rectal area  Patient does go about 4 to 5 times a day  Patient also has urgency with her bowels  Sometimes she does not always make it to the bathroom  This is been a problem since her gallbladder has been removed  Her stools are loose, but not watery  No fever chills  Patient did have some left-sided pain yesterday, that resolved  Patient does get some sharp pain in her rectum at times  It is very quick in duration  The following portions of the patient's history were reviewed and updated as appropriate:   She has a past medical history of Bladder stone, Hyperlipidemia, Stroke ([facility] [address] ), and Ureteral calculus, right ,  does not have any pertinent problems on file  ,   has a past surgical history that includes Gallbladder surgery  ,  family history includes Cancer in her maternal grandmother; No Known Problems in her maternal grandfather, paternal grandfather, paternal grandmother, sister, and sister; Stomach cancer in her maternal grandmother; Stroke in her mother  ,   reports that she has never smoked  She has never used smokeless tobacco  She reports current alcohol use  She reports that she does not use drugs  ,  is allergic to penicillin g and penicillins     Current Outpatient Medications   Medication Sig Dispense Refill    ascorbic acid (VITAMIN C) 500 mg tablet Take 500 mg by mouth daily      aspirin (ECOTRIN LOW STRENGTH) 81 mg EC tablet Take 1 tablet (81 mg total) by mouth daily 90 tablet 0    CRANBERRY PO Take 168 mg by mouth      Dextromethorphan-guaiFENesin (MUCINEX DM PO) Take by mouth      Lactobacillus (ACIDOPHILUS PO) Take by mouth      lovastatin (MEVACOR) 40 MG tablet Take 1 tablet by mouth once daily 90 tablet 3    Multiple Vitamins-Minerals (CENTRUM PO) Take by mouth      omeprazole (PriLOSEC) 20 mg delayed release capsule Take 20 mg by mouth daily      ondansetron (ZOFRAN-ODT) 4 mg disintegrating tablet Take 1 tablet (4 mg total) by mouth every 6 (six) hours as needed for nausea or vomiting 20 tablet 0    VITAMIN E PO Take 184 mg by mouth      colesevelam (WELCHOL) 625 mg tablet Take 1 tablet (625 mg total) by mouth 2 (two) times a day with meals 60 tablet 1     No current facility-administered medications for this visit  Review of Systems   Constitutional: Negative  Respiratory: Negative  Cardiovascular: Negative  Gastrointestinal:        Urgency   Genitourinary: Positive for urgency  Objective:  Vitals:    09/17/20 0934   BP: 112/70   Temp: (!) 97 1 °F (36 2 °C)   Weight: 72 kg (158 lb 12 8 oz)   Height: 5' 1" (1 549 m)     Body mass index is 30 kg/m²  Physical Exam  Vitals signs reviewed  Constitutional:       General: She is not in acute distress  Appearance: She is well-developed  She is not diaphoretic  HENT:      Head: Normocephalic and atraumatic  Eyes:      Conjunctiva/sclera: Conjunctivae normal    Neurological:      Mental Status: She is alert and oriented to person, place, and time  Psychiatric:         Behavior: Behavior normal          Thought Content:  Thought content normal          Judgment: Judgment normal

## 2020-09-18 LAB — BACTERIA UR CULT: NORMAL

## 2020-09-21 ENCOUNTER — HOSPITAL ENCOUNTER (OUTPATIENT)
Dept: MAMMOGRAPHY | Facility: HOSPITAL | Age: 60
Discharge: HOME | End: 2020-09-21
Attending: OBSTETRICS & GYNECOLOGY
Payer: COMMERCIAL

## 2020-09-21 VITALS — WEIGHT: 158 LBS | HEIGHT: 61 IN | BODY MASS INDEX: 29.83 KG/M2

## 2020-09-21 DIAGNOSIS — Z12.31 ENCOUNTER FOR SCREENING MAMMOGRAM FOR MALIGNANT NEOPLASM OF BREAST: ICD-10-CM

## 2020-09-21 PROCEDURE — 77063 BREAST TOMOSYNTHESIS BI: CPT

## 2020-09-21 PROCEDURE — 77067 SCR MAMMO BI INCL CAD: CPT

## 2020-09-24 ENCOUNTER — HOSPITAL ENCOUNTER (OUTPATIENT)
Dept: MAMMOGRAPHY | Facility: HOSPITAL | Age: 60
Discharge: HOME | End: 2020-09-24
Payer: COMMERCIAL

## 2020-09-24 ENCOUNTER — HOSPITAL ENCOUNTER (OUTPATIENT)
Dept: ULTRASOUND IMAGING | Facility: HOSPITAL | Age: 60
Discharge: HOME | End: 2020-09-24
Payer: COMMERCIAL

## 2020-09-24 VITALS — HEIGHT: 61 IN | WEIGHT: 158 LBS | BODY MASS INDEX: 29.83 KG/M2

## 2020-09-24 DIAGNOSIS — R92.8 ABNORMAL MAMMOGRAM: ICD-10-CM

## 2020-09-24 PROCEDURE — 77065 DX MAMMO INCL CAD UNI: CPT

## 2020-09-24 PROCEDURE — G0279 TOMOSYNTHESIS, MAMMO: HCPCS

## 2020-10-09 ENCOUNTER — ANNUAL EXAM (OUTPATIENT)
Dept: OBGYN CLINIC | Facility: MEDICAL CENTER | Age: 60
End: 2020-10-09
Payer: COMMERCIAL

## 2020-10-09 VITALS
WEIGHT: 156.2 LBS | DIASTOLIC BLOOD PRESSURE: 64 MMHG | TEMPERATURE: 98 F | BODY MASS INDEX: 29.51 KG/M2 | SYSTOLIC BLOOD PRESSURE: 108 MMHG

## 2020-10-09 DIAGNOSIS — Z12.31 ENCOUNTER FOR SCREENING MAMMOGRAM FOR MALIGNANT NEOPLASM OF BREAST: ICD-10-CM

## 2020-10-09 DIAGNOSIS — Z01.419 ENCOUNTER FOR WELL WOMAN EXAM WITH ROUTINE GYNECOLOGICAL EXAM: Primary | ICD-10-CM

## 2020-10-09 PROCEDURE — S0612 ANNUAL GYNECOLOGICAL EXAMINA: HCPCS | Performed by: OBSTETRICS & GYNECOLOGY

## 2020-11-11 ENCOUNTER — TELEPHONE (OUTPATIENT)
Dept: FAMILY MEDICINE CLINIC | Facility: CLINIC | Age: 60
End: 2020-11-11

## 2020-11-11 DIAGNOSIS — Z90.49 S/P CHOLECYSTECTOMY: ICD-10-CM

## 2020-11-11 DIAGNOSIS — R15.2 FECAL URGENCY: ICD-10-CM

## 2020-11-11 RX ORDER — MONTELUKAST SODIUM 4 MG/1
1 TABLET, CHEWABLE ORAL 2 TIMES DAILY
Qty: 180 TABLET | Refills: 3 | Status: SHIPPED | OUTPATIENT
Start: 2020-11-11 | End: 2020-11-13 | Stop reason: SDUPTHER

## 2020-11-13 DIAGNOSIS — R15.2 FECAL URGENCY: ICD-10-CM

## 2020-11-13 DIAGNOSIS — Z90.49 S/P CHOLECYSTECTOMY: ICD-10-CM

## 2020-11-13 RX ORDER — MONTELUKAST SODIUM 4 MG/1
1 TABLET, CHEWABLE ORAL 2 TIMES DAILY
Qty: 180 TABLET | Refills: 3 | Status: SHIPPED | OUTPATIENT
Start: 2020-11-13 | End: 2020-11-16 | Stop reason: SDUPTHER

## 2020-11-16 ENCOUNTER — TELEPHONE (OUTPATIENT)
Dept: FAMILY MEDICINE CLINIC | Facility: CLINIC | Age: 60
End: 2020-11-16

## 2020-11-16 DIAGNOSIS — R15.2 FECAL URGENCY: ICD-10-CM

## 2020-11-16 DIAGNOSIS — Z90.49 S/P CHOLECYSTECTOMY: ICD-10-CM

## 2020-11-16 RX ORDER — MONTELUKAST SODIUM 4 MG/1
1 TABLET, CHEWABLE ORAL 2 TIMES DAILY
Qty: 180 TABLET | Refills: 3 | Status: SHIPPED | OUTPATIENT
Start: 2020-11-16 | End: 2021-06-02 | Stop reason: ALTCHOICE

## 2020-12-02 ENCOUNTER — OFFICE VISIT (OUTPATIENT)
Dept: FAMILY MEDICINE CLINIC | Facility: CLINIC | Age: 60
End: 2020-12-02
Payer: COMMERCIAL

## 2020-12-02 VITALS
HEIGHT: 61 IN | HEART RATE: 91 BPM | WEIGHT: 155.4 LBS | BODY MASS INDEX: 29.34 KG/M2 | SYSTOLIC BLOOD PRESSURE: 124 MMHG | OXYGEN SATURATION: 92 % | DIASTOLIC BLOOD PRESSURE: 82 MMHG | TEMPERATURE: 96.9 F

## 2020-12-02 DIAGNOSIS — E78.49 OTHER HYPERLIPIDEMIA: Primary | ICD-10-CM

## 2020-12-02 DIAGNOSIS — E78.5 HYPERLIPIDEMIA, UNSPECIFIED HYPERLIPIDEMIA TYPE: ICD-10-CM

## 2020-12-02 DIAGNOSIS — F41.1 GENERALIZED ANXIETY DISORDER: ICD-10-CM

## 2020-12-02 PROCEDURE — 3008F BODY MASS INDEX DOCD: CPT | Performed by: FAMILY MEDICINE

## 2020-12-02 PROCEDURE — 3725F SCREEN DEPRESSION PERFORMED: CPT | Performed by: FAMILY MEDICINE

## 2020-12-02 PROCEDURE — 99213 OFFICE O/P EST LOW 20 MIN: CPT | Performed by: FAMILY MEDICINE

## 2020-12-02 PROCEDURE — 1036F TOBACCO NON-USER: CPT | Performed by: FAMILY MEDICINE

## 2020-12-02 RX ORDER — LOVASTATIN 40 MG/1
TABLET ORAL
Qty: 90 TABLET | Refills: 3 | Status: SHIPPED | OUTPATIENT
Start: 2020-12-02 | End: 2021-05-26

## 2020-12-30 NOTE — PROGRESS NOTES
MDT-LOOP RECORDER  CARELINK TRANSMISSION: LOOP RECORDER  PRESENTING RHYTHM NSR @ 80 BPM  BATTERY STATUS "OK"  NO PATIENT OR DEVICE ACTIVATED EPISODES  NORMAL DEVICE FUNCTION   DL Unable to assess Unable to assess Normal Unable to assess Unable to assess Unable to assess Normal

## 2021-01-04 ENCOUNTER — TELEPHONE (OUTPATIENT)
Dept: FAMILY MEDICINE CLINIC | Facility: CLINIC | Age: 61
End: 2021-01-04

## 2021-01-04 DIAGNOSIS — R91.8 LUNG NODULES: Primary | ICD-10-CM

## 2021-01-04 NOTE — TELEPHONE ENCOUNTER
Received  A letter from Department of Radiology regarding CTA of neck and brain  Dated 7/11/19  Based on current Fleischner Society 2017 Guidelines on incidental pulmonary nodule, no routine follow-up is needed if the patient is considered low risk for lung cancer  If the patient is considered high risk for lung cancer, 12 month follow-up non-contrast chest CT is recommended  Ordering Physician is Blanche quinteros you would like to order any further testing  Thank you

## 2021-01-18 ENCOUNTER — TELEPHONE (OUTPATIENT)
Dept: NEUROLOGY | Facility: CLINIC | Age: 61
End: 2021-01-18

## 2021-01-19 ENCOUNTER — OFFICE VISIT (OUTPATIENT)
Dept: NEUROLOGY | Facility: CLINIC | Age: 61
End: 2021-01-19
Payer: COMMERCIAL

## 2021-01-19 VITALS
SYSTOLIC BLOOD PRESSURE: 140 MMHG | BODY MASS INDEX: 28.89 KG/M2 | WEIGHT: 153 LBS | HEART RATE: 70 BPM | DIASTOLIC BLOOD PRESSURE: 100 MMHG | HEIGHT: 61 IN

## 2021-01-19 DIAGNOSIS — I63.512 ACUTE ISCHEMIC LEFT MIDDLE CEREBRAL ARTERY (MCA) STROKE (HCC): ICD-10-CM

## 2021-01-19 DIAGNOSIS — E78.49 OTHER HYPERLIPIDEMIA: Primary | ICD-10-CM

## 2021-01-19 PROCEDURE — 1036F TOBACCO NON-USER: CPT | Performed by: PSYCHIATRY & NEUROLOGY

## 2021-01-19 PROCEDURE — 3008F BODY MASS INDEX DOCD: CPT | Performed by: PSYCHIATRY & NEUROLOGY

## 2021-01-19 PROCEDURE — 99213 OFFICE O/P EST LOW 20 MIN: CPT | Performed by: PSYCHIATRY & NEUROLOGY

## 2021-01-19 NOTE — PROGRESS NOTES
Patient ID: Walda Rinne is a 56 y o  female  Assessment/Plan: This is a 55 y/o Female who is here as a follow up for history of L MCA stroke  Patient does have a loop recorder in place, no afib detected so far  No new TIA/CVA like symptoms  PLAN:      Diagnoses and all orders for this visit:    Other hyperlipidemia  -continue with cholesterol medication at this time  Tolerating them well without any side effects  Acute ischemic left middle cerebral artery (MCA) stroke (HCC)  Treatment -   -c/w with combination of aspirin and lovastatin for secondary stroke prevention    Risk factor modifications/prevention  -BP goal < 130/80, BP is at goal    -Defer to PCP regarding DM and BP management    Therapy -   - PT/OT    Counseling -   -does not smoke at this time   -denies any history of snoring    -I advised patient to avoid using NSAIDs for headaches or other pain and to stick to tylenol if needed  -Recommend mediterranean diet & regular exercise regimen atleast 4-5 times a week for 20-30 minutes  -I educated patient/family regarding medication compliance     Follow up - 1 year  I would be happy to see the patient sooner if any new questions/concerns arise  Patient/Guardian was advised to the call the office if they have any questions and concerns in the meantime  Patient/Guardian does understand that if they have any new stroke like symptoms such as facial droop on one side, weakness/paralysis on either side, speech trouble, numbness on one side, balance issues, any vision changes, extreme dizziness or any new headache, to call 9-1-1 immediately or to proceed to the nearest ER immediately  Subjective:    HPI    This is a 55 y/o F a follow-up of history of stroke patient is doing well  Denies any new TIA/Cerebrovascular accident like symptoms  Patient is compliant medications  Patient is tolerating lovastatin and anti-platelet regimen  Denies any new residual deficits    Patient is compliant with driving  The following portions of the patient's history were reviewed and updated as appropriate:   She  has a past medical history of Bladder stone, Hyperlipidemia, Stroke ([facility] [address] ), and Ureteral calculus, right  She   Patient Active Problem List    Diagnosis Date Noted    Lung nodules 01/04/2021    Stroke aborted by administration of thrombolytic agent ([facility] [address] ) 07/11/2019    Acute ischemic left middle cerebral artery (MCA) stroke (HCC)     Age-related osteoporosis without current pathological fracture 10/09/2018    Seasonal allergic rhinitis due to pollen 05/14/2018    Lesion of nose 05/14/2018    Increased BMI 11/06/2017    Generalized anxiety disorder 08/10/2012    Other hyperlipidemia 07/06/2012     She  has a past surgical history that includes Gallbladder surgery  Her family history includes Cancer in her maternal grandmother; No Known Problems in her maternal aunt, maternal aunt, maternal aunt, maternal aunt, maternal aunt, maternal aunt, maternal grandfather, paternal aunt, paternal aunt, paternal aunt, paternal grandfather, paternal grandmother, sister, and sister; Stomach cancer in her maternal grandmother; Stroke in her mother  She  reports that she has never smoked  She has never used smokeless tobacco  She reports previous alcohol use  She reports that she does not use drugs    Current Outpatient Medications   Medication Sig Dispense Refill    ascorbic acid (VITAMIN C) 500 mg tablet Take 500 mg by mouth daily      aspirin (ECOTRIN LOW STRENGTH) 81 mg EC tablet Take 1 tablet (81 mg total) by mouth daily 90 tablet 0    CRANBERRY PO Take 168 mg by mouth      Dextromethorphan-guaiFENesin (MUCINEX DM PO) Take by mouth      Lactobacillus (ACIDOPHILUS PO) Take by mouth      lovastatin (MEVACOR) 40 MG tablet Take 1 tablet by mouth once daily 90 tablet 3    Multiple Vitamins-Minerals (CENTRUM PO) Take by mouth      omeprazole (PriLOSEC) 20 mg delayed release capsule Take 20 mg by mouth daily      ondansetron (ZOFRAN-ODT) 4 mg disintegrating tablet Take 1 tablet (4 mg total) by mouth every 6 (six) hours as needed for nausea or vomiting 20 tablet 0    VITAMIN E PO Take 184 mg by mouth      colestipol (COLESTID) 1 g tablet Take 1 tablet (1 g total) by mouth 2 (two) times a day (Patient not taking: Reported on 1/19/2021) 180 tablet 3     No current facility-administered medications for this visit  Current Outpatient Medications on File Prior to Visit   Medication Sig    ascorbic acid (VITAMIN C) 500 mg tablet Take 500 mg by mouth daily    aspirin (ECOTRIN LOW STRENGTH) 81 mg EC tablet Take 1 tablet (81 mg total) by mouth daily    CRANBERRY PO Take 168 mg by mouth    Dextromethorphan-guaiFENesin (MUCINEX DM PO) Take by mouth    Lactobacillus (ACIDOPHILUS PO) Take by mouth    lovastatin (MEVACOR) 40 MG tablet Take 1 tablet by mouth once daily    Multiple Vitamins-Minerals (CENTRUM PO) Take by mouth    omeprazole (PriLOSEC) 20 mg delayed release capsule Take 20 mg by mouth daily    ondansetron (ZOFRAN-ODT) 4 mg disintegrating tablet Take 1 tablet (4 mg total) by mouth every 6 (six) hours as needed for nausea or vomiting    VITAMIN E PO Take 184 mg by mouth    colestipol (COLESTID) 1 g tablet Take 1 tablet (1 g total) by mouth 2 (two) times a day (Patient not taking: Reported on 1/19/2021)     No current facility-administered medications on file prior to visit  She is allergic to penicillin g and penicillins            Objective:    Blood pressure 140/100, pulse 70, height 5' 1" (1 549 m), weight 69 4 kg (153 lb), not currently breastfeeding  Physical Exam  General - Patient is alert, awake, oriented to time, place and person, follows commands  Speech - no dysarthria noted, no aphasia noted     Neck - supple    Neuro:   Cranial nerves: PERRL, EOMI, facial sensation intact, able to raise eyebrows symmetrically, cannot assess facial droop and tongue deviation due to face mask requirement  Motor: 5/5 throughout, normal tone, no pronator drift noted  Sensory - intact to soft touch throughout  Coordination - no ataxia/dysmetria noted  Gait - normal tandem walk without     ROS:  I personally reviewed ROS   Review of Systems   Constitutional: Negative  Negative for appetite change and fever  HENT: Negative  Negative for hearing loss, tinnitus, trouble swallowing and voice change  Eyes: Negative  Negative for photophobia and pain  Respiratory: Negative  Negative for shortness of breath  Cardiovascular: Negative  Negative for palpitations  Gastrointestinal: Negative  Negative for nausea and vomiting  Endocrine: Negative  Negative for cold intolerance  Genitourinary: Negative  Negative for dysuria, frequency and urgency  Musculoskeletal: Negative  Negative for myalgias and neck pain  Skin: Negative  Negative for rash  Neurological: Negative  Negative for dizziness, tremors, seizures, syncope, facial asymmetry, speech difficulty, weakness, light-headedness, numbness and headaches  Hematological: Negative  Does not bruise/bleed easily  Psychiatric/Behavioral: Negative  Negative for confusion, hallucinations and sleep disturbance

## 2021-02-03 ENCOUNTER — TELEMEDICINE (OUTPATIENT)
Dept: CARDIOLOGY CLINIC | Facility: CLINIC | Age: 61
End: 2021-02-03
Payer: COMMERCIAL

## 2021-02-03 VITALS — BODY MASS INDEX: 29.66 KG/M2 | SYSTOLIC BLOOD PRESSURE: 130 MMHG | DIASTOLIC BLOOD PRESSURE: 80 MMHG | WEIGHT: 157 LBS

## 2021-02-03 DIAGNOSIS — E78.5 DYSLIPIDEMIA: Primary | ICD-10-CM

## 2021-02-03 PROCEDURE — 99213 OFFICE O/P EST LOW 20 MIN: CPT | Performed by: INTERNAL MEDICINE

## 2021-02-03 NOTE — PROGRESS NOTES
Virtual Regular Visit      Assessment/Plan:    Problem List Items Addressed This Visit     None               Reason for visit is   Chief Complaint   Patient presents with    Cerebrovascular Accident     1 year follow up    Virtual Regular Visit        Encounter provider Tessy Ha DO    Provider located at [address]  [address] [place] [id number]      Recent Visits  No visits were found meeting these conditions  Showing recent visits within past 7 days and meeting all other requirements     Today's Visits  Date Type Provider Dept   02/03/21 Telemedicine Rujul Lisette Chacon DO [facility] [place]   Showing today's visits and meeting all other requirements     Future Appointments  No visits were found meeting these conditions  Showing future appointments within next 150 days and meeting all other requirements        The patient was identified by name and date of birth  Jojo Johnston was informed that this is a telemedicine visit and that the visit is being conducted through [facility] and patient was informed that this is a secure, HIPAA-compliant platform  She agrees to proceed     My office door was closed  No one else was in the room  She acknowledged consent and understanding of privacy and security of the video platform  The patient has agreed to participate and understands they can discontinue the visit at any time  Patient is aware this is a billable service  Subjective    Jojo Johnston is a 56 y o  female hospitalized 07/2019 with acute right-sided weakness  She was administered tPA as per stroke protocol  MRI brain revealed small residual area of ischemia within the MCA territory not salvaged by tPA  Echocardiogram was unremarkable  She underwent loop recorder implantation 08/06/2019       During her prior visit 01/2020, she was offered a JOHN PAUL that this was never done as part of her workup for cryptogenic stroke, and rationale for the same was explained to her  She politely declined stating that she would  Let me know if she decides to do this  She presents today for a telemedicine video follow-up in the setting of COVID 19 pandemic  From a symptomatic standpoint she feels well and has no complaints for me today  She denies exertional chest pain, shortness of breath, dizziness, palpitations, lower extremity edema  Assessment/plan:    1  CVA 07/11/2019 status post tPA administration, status post loop recorder implantation 08/2019  2  Dyslipidemia    · Loop recorder interrogations reviewed, within normal limits without evidence of atrial dysrhythmia  · Prior lipid panel reviewed, suboptimal   Once again recommended she try a higher potency statin, but states she had myalgias "on all of them "  I have offered working with her to see if she would be able to tolerate a lower dose of rosuvastatin for better lipid control, but she declines, stating that she is due for repeat blood work in the near future, and will let me know if she decides to switch over    · She has kindly refused a JOHN PAUL in the past         Follow-up with me in 1 year      HPI     Past Medical History:   Diagnosis Date    Bladder stone     Hyperlipidemia     Stroke [facility])     Left hand    Ureteral calculus, right        Past Surgical History:   Procedure Laterality Date    GALLBLADDER SURGERY      onset: 2011       Current Outpatient Medications   Medication Sig Dispense Refill    ascorbic acid (VITAMIN C) 500 mg tablet Take 500 mg by mouth daily      aspirin (ECOTRIN LOW STRENGTH) 81 mg EC tablet Take 1 tablet (81 mg total) by mouth daily 90 tablet 0    CRANBERRY PO Take 168 mg by mouth      Dextromethorphan-guaiFENesin (MUCINEX DM PO) Take by mouth      Lactobacillus (ACIDOPHILUS PO) Take by mouth      lovastatin (MEVACOR) 40 MG tablet Take 1 tablet by mouth once daily 90 tablet 3    Multiple Vitamins-Minerals (CENTRUM PO) Take by mouth      omeprazole (PriLOSEC) 20 mg delayed release capsule Take 20 mg by mouth daily      VITAMIN E PO Take 184 mg by mouth      colestipol (COLESTID) 1 g tablet Take 1 tablet (1 g total) by mouth 2 (two) times a day (Patient not taking: Reported on 1/19/2021) 180 tablet 3    ondansetron (ZOFRAN-ODT) 4 mg disintegrating tablet Take 1 tablet (4 mg total) by mouth every 6 (six) hours as needed for nausea or vomiting (Patient not taking: Reported on 2/3/2021) 20 tablet 0     No current facility-administered medications for this visit  Allergies   Allergen Reactions    Penicillin G      Rash    Penicillins Hives       Review of Systems   Constitutional: Negative for activity change, fever and unexpected weight change  HENT: Negative for facial swelling, nosebleeds and voice change  Respiratory: Negative for chest tightness, shortness of breath and wheezing  Cardiovascular: Negative for chest pain, palpitations and leg swelling  Gastrointestinal: Negative for abdominal distention  Genitourinary: Negative for hematuria  Musculoskeletal: Negative for arthralgias  Skin: Negative for color change, pallor, rash and wound  Neurological: Negative for dizziness, seizures and syncope  Psychiatric/Behavioral: Negative for agitation  Video Exam    Vitals:    02/03/21 0943   BP: 130/80   Weight: 71 2 kg (157 lb)       Physical Exam  Vitals signs reviewed  Constitutional:       Appearance: She is well-developed  HENT:      Head: Normocephalic and atraumatic  Neck:      Musculoskeletal: Normal range of motion and neck supple  Cardiovascular:      Rate and Rhythm: Normal rate and regular rhythm  Heart sounds: Normal heart sounds  Pulmonary:      Effort: Pulmonary effort is normal       Breath sounds: Normal breath sounds  Abdominal:      Palpations: Abdomen is soft  Musculoskeletal: Normal range of motion  Skin:     General: Skin is warm and dry     Neurological:      Mental Status: She is alert and oriented to person, place, and time  Psychiatric:         Behavior: Behavior normal          Thought Content: Thought content normal          Judgment: Judgment normal           I spent 10 minutes directly with the patient during this visit      VIRTUAL VISIT DISCLAIMER    Natividad Swanson acknowledges that she has consented to an online visit or consultation  She understands that the online visit is based solely on information provided by her, and that, in the absence of a face-to-face physical evaluation by the physician, the diagnosis she receives is both limited and provisional in terms of accuracy and completeness  This is not intended to replace a full medical face-to-face evaluation by the physician  Natividad Swanson understands and accepts these terms

## 2021-02-17 ENCOUNTER — REMOTE DEVICE CLINIC VISIT (OUTPATIENT)
Dept: CARDIOLOGY CLINIC | Facility: CLINIC | Age: 61
End: 2021-02-17
Payer: COMMERCIAL

## 2021-02-17 DIAGNOSIS — Z95.818 PRESENCE OF OTHER CARDIAC IMPLANTS AND GRAFTS: Primary | ICD-10-CM

## 2021-02-17 PROCEDURE — 93298 REM INTERROG DEV EVAL SCRMS: CPT | Performed by: INTERNAL MEDICINE

## 2021-02-17 PROCEDURE — G2066 INTER DEVC REMOTE 30D: HCPCS | Performed by: INTERNAL MEDICINE

## 2021-02-17 NOTE — PROGRESS NOTES
MDT-LOOP RECORDER   CARELINK TRANSMISSION: LOOP RECORDER  PRESENTING RHYTHM NSR @ 85 BPM  BATTERY STATUS "OK"  NO PATIENT OR DEVICE ACTIVATED EPISODES  NORMAL DEVICE FUNCTION   DL

## 2021-03-10 DIAGNOSIS — Z23 ENCOUNTER FOR IMMUNIZATION: ICD-10-CM

## 2021-03-24 ENCOUNTER — IMMUNIZATIONS (OUTPATIENT)
Dept: FAMILY MEDICINE CLINIC | Facility: HOSPITAL | Age: 61
End: 2021-03-24

## 2021-03-24 DIAGNOSIS — Z23 ENCOUNTER FOR IMMUNIZATION: Primary | ICD-10-CM

## 2021-03-24 PROCEDURE — 91301 SARS-COV-2 / COVID-19 MRNA VACCINE (MODERNA) 100 MCG: CPT

## 2021-03-24 PROCEDURE — 0011A SARS-COV-2 / COVID-19 MRNA VACCINE (MODERNA) 100 MCG: CPT

## 2021-04-22 ENCOUNTER — IMMUNIZATIONS (OUTPATIENT)
Dept: FAMILY MEDICINE CLINIC | Facility: HOSPITAL | Age: 61
End: 2021-04-22

## 2021-04-22 DIAGNOSIS — Z23 ENCOUNTER FOR IMMUNIZATION: Primary | ICD-10-CM

## 2021-04-22 PROCEDURE — 0012A SARS-COV-2 / COVID-19 MRNA VACCINE (MODERNA) 100 MCG: CPT

## 2021-04-22 PROCEDURE — 91301 SARS-COV-2 / COVID-19 MRNA VACCINE (MODERNA) 100 MCG: CPT

## 2021-05-06 ENCOUNTER — REMOTE DEVICE CLINIC VISIT (OUTPATIENT)
Dept: CARDIOLOGY CLINIC | Facility: CLINIC | Age: 61
End: 2021-05-06
Payer: COMMERCIAL

## 2021-05-06 DIAGNOSIS — Z95.818 PRESENCE OF OTHER CARDIAC IMPLANTS AND GRAFTS: Primary | ICD-10-CM

## 2021-05-06 PROCEDURE — 93298 REM INTERROG DEV EVAL SCRMS: CPT | Performed by: INTERNAL MEDICINE

## 2021-05-06 PROCEDURE — G2066 INTER DEVC REMOTE 30D: HCPCS | Performed by: INTERNAL MEDICINE

## 2021-05-06 NOTE — PROGRESS NOTES
MDT-LOOP RECORDER   CARELINK TRANSMISSION: LOOP RECORDER  PRESENTING RHYTHM NSR @ 99 BPM  BATTERY STATUS "OK"  NO PATIENT OR DEVICE ACTIVATED EPISODES  NORMAL DEVICE FUNCTION   DL

## 2021-05-26 ENCOUNTER — APPOINTMENT (OUTPATIENT)
Dept: LAB | Facility: MEDICAL CENTER | Age: 61
End: 2021-05-26
Payer: COMMERCIAL

## 2021-05-26 ENCOUNTER — RA CDI HCC (OUTPATIENT)
Dept: OTHER | Facility: HOSPITAL | Age: 61
End: 2021-05-26

## 2021-05-26 DIAGNOSIS — E78.49 OTHER HYPERLIPIDEMIA: Primary | ICD-10-CM

## 2021-05-26 LAB
ALBUMIN SERPL BCP-MCNC: 3.9 G/DL (ref 3.5–5)
ALP SERPL-CCNC: 108 U/L (ref 46–116)
ALT SERPL W P-5'-P-CCNC: 19 U/L (ref 12–78)
ANION GAP SERPL CALCULATED.3IONS-SCNC: 5 MMOL/L (ref 4–13)
AST SERPL W P-5'-P-CCNC: 19 U/L (ref 5–45)
BASOPHILS # BLD AUTO: 0.07 THOUSANDS/ΜL (ref 0–0.1)
BASOPHILS NFR BLD AUTO: 1 % (ref 0–1)
BILIRUB SERPL-MCNC: 0.47 MG/DL (ref 0.2–1)
BUN SERPL-MCNC: 22 MG/DL (ref 5–25)
CALCIUM SERPL-MCNC: 9.9 MG/DL (ref 8.3–10.1)
CHLORIDE SERPL-SCNC: 107 MMOL/L (ref 100–108)
CHOLEST SERPL-MCNC: 204 MG/DL (ref 50–200)
CO2 SERPL-SCNC: 27 MMOL/L (ref 21–32)
CREAT SERPL-MCNC: 0.74 MG/DL (ref 0.6–1.3)
EOSINOPHIL # BLD AUTO: 0.29 THOUSAND/ΜL (ref 0–0.61)
EOSINOPHIL NFR BLD AUTO: 5 % (ref 0–6)
ERYTHROCYTE [DISTWIDTH] IN BLOOD BY AUTOMATED COUNT: 12.8 % (ref 11.6–15.1)
GFR SERPL CREATININE-BSD FRML MDRD: 87 ML/MIN/1.73SQ M
GLUCOSE P FAST SERPL-MCNC: 88 MG/DL (ref 65–99)
HCT VFR BLD AUTO: 46.1 % (ref 34.8–46.1)
HDLC SERPL-MCNC: 62 MG/DL
HGB BLD-MCNC: 15 G/DL (ref 11.5–15.4)
IMM GRANULOCYTES # BLD AUTO: 0.02 THOUSAND/UL (ref 0–0.2)
IMM GRANULOCYTES NFR BLD AUTO: 0 % (ref 0–2)
LDLC SERPL CALC-MCNC: 126 MG/DL (ref 0–100)
LYMPHOCYTES # BLD AUTO: 2.11 THOUSANDS/ΜL (ref 0.6–4.47)
LYMPHOCYTES NFR BLD AUTO: 36 % (ref 14–44)
MCH RBC QN AUTO: 30.6 PG (ref 26.8–34.3)
MCHC RBC AUTO-ENTMCNC: 32.5 G/DL (ref 31.4–37.4)
MCV RBC AUTO: 94 FL (ref 82–98)
MONOCYTES # BLD AUTO: 0.55 THOUSAND/ΜL (ref 0.17–1.22)
MONOCYTES NFR BLD AUTO: 10 % (ref 4–12)
NEUTROPHILS # BLD AUTO: 2.78 THOUSANDS/ΜL (ref 1.85–7.62)
NEUTS SEG NFR BLD AUTO: 48 % (ref 43–75)
NRBC BLD AUTO-RTO: 0 /100 WBCS
PLATELET # BLD AUTO: 291 THOUSANDS/UL (ref 149–390)
PMV BLD AUTO: 9.6 FL (ref 8.9–12.7)
POTASSIUM SERPL-SCNC: 4 MMOL/L (ref 3.5–5.3)
PROT SERPL-MCNC: 7.5 G/DL (ref 6.4–8.2)
RBC # BLD AUTO: 4.9 MILLION/UL (ref 3.81–5.12)
SODIUM SERPL-SCNC: 139 MMOL/L (ref 136–145)
TRIGL SERPL-MCNC: 81 MG/DL
TSH SERPL DL<=0.05 MIU/L-ACNC: 2.56 UIU/ML (ref 0.36–3.74)
WBC # BLD AUTO: 5.82 THOUSAND/UL (ref 4.31–10.16)

## 2021-05-26 PROCEDURE — 80061 LIPID PANEL: CPT | Performed by: FAMILY MEDICINE

## 2021-05-26 PROCEDURE — 84443 ASSAY THYROID STIM HORMONE: CPT | Performed by: FAMILY MEDICINE

## 2021-05-26 PROCEDURE — 80053 COMPREHEN METABOLIC PANEL: CPT | Performed by: FAMILY MEDICINE

## 2021-05-26 PROCEDURE — 85025 COMPLETE CBC W/AUTO DIFF WBC: CPT | Performed by: FAMILY MEDICINE

## 2021-05-26 PROCEDURE — 36415 COLL VENOUS BLD VENIPUNCTURE: CPT | Performed by: FAMILY MEDICINE

## 2021-05-26 RX ORDER — ATORVASTATIN CALCIUM 20 MG/1
20 TABLET, FILM COATED ORAL DAILY
Qty: 30 TABLET | Refills: 5 | Status: SHIPPED | OUTPATIENT
Start: 2021-05-26 | End: 2021-07-23 | Stop reason: SDUPTHER

## 2021-05-26 NOTE — PROGRESS NOTES
[place] [id number]  coding opportunities             Chart reviewed, (number of) suggestions sent to provider: 1           Patients insurance company: [employer] (Medicare Advantage and Commercial)     Visit status: Patient arrived for their scheduled appointment     Provider never responded to [place] [phone]  coding request     [place] [address]  coding opportunities                      Patients insurance company: [employer] ([employer])             Found on active problem list - please assess for current status     I63 9 Stroke aborted by administration of thrombolytic agent [facility])  I63 512 Acute ischemic left middle cerebral artery (MCA) stroke (HCC)        ----From the correct coding standpoint, using the below code for personal history of stroke with no     residual effects would be more appropriate:   Z86 73: Personal history of transient ischemic attack, and cerebral infarction without residual deficits

## 2021-06-02 ENCOUNTER — OFFICE VISIT (OUTPATIENT)
Dept: FAMILY MEDICINE CLINIC | Facility: CLINIC | Age: 61
End: 2021-06-02
Payer: COMMERCIAL

## 2021-06-02 VITALS
OXYGEN SATURATION: 96 % | DIASTOLIC BLOOD PRESSURE: 80 MMHG | BODY MASS INDEX: 27.68 KG/M2 | SYSTOLIC BLOOD PRESSURE: 134 MMHG | HEIGHT: 61 IN | HEART RATE: 97 BPM | WEIGHT: 146.6 LBS | TEMPERATURE: 98.4 F

## 2021-06-02 DIAGNOSIS — E78.49 OTHER HYPERLIPIDEMIA: Primary | ICD-10-CM

## 2021-06-02 DIAGNOSIS — F41.1 GENERALIZED ANXIETY DISORDER: ICD-10-CM

## 2021-06-02 DIAGNOSIS — I63.512 ACUTE ISCHEMIC LEFT MIDDLE CEREBRAL ARTERY (MCA) STROKE (HCC): ICD-10-CM

## 2021-06-02 PROCEDURE — 99213 OFFICE O/P EST LOW 20 MIN: CPT | Performed by: FAMILY MEDICINE

## 2021-06-02 PROCEDURE — 3725F SCREEN DEPRESSION PERFORMED: CPT | Performed by: FAMILY MEDICINE

## 2021-06-02 PROCEDURE — 3008F BODY MASS INDEX DOCD: CPT | Performed by: FAMILY MEDICINE

## 2021-06-02 PROCEDURE — 1036F TOBACCO NON-USER: CPT | Performed by: FAMILY MEDICINE

## 2021-06-02 NOTE — PROGRESS NOTES
Assessment/Plan:  Patient will continue to watch her diet and continue try be as active as she can  We will see her back in 4 months, getting blood work before that visit  Problem List Items Addressed This Visit        Cardiovascular and Mediastinum    Acute ischemic left middle cerebral artery (MCA) stroke (HCC)       Other    Generalized anxiety disorder    Other hyperlipidemia - Primary    Relevant Orders    Comprehensive metabolic panel    Lipid Panel with Direct LDL reflex      Other Visit Diagnoses     BMI 27 0-27 9,adult               Diagnoses and all orders for this visit:    Other hyperlipidemia  -     Comprehensive metabolic panel; Future  -     Lipid Panel with Direct LDL reflex; Future    Acute ischemic left middle cerebral artery (MCA) stroke (HCC)    Generalized anxiety disorder    BMI 27 0-27 9,adult        No problem-specific Assessment & Plan notes found for this encounter  Subjective:      Patient ID: Alyssa Herrera is a 56 y o  female  Patient here today for follow-up  Patient is doing well  She has managed to lose about 10 lb  She has been watching her calorie intake  Patient is tolerating the atorvastatin well  Hyperlipidemia  This is a chronic problem  The problem is resistant  There are no known factors aggravating her hyperlipidemia  Current antihyperlipidemic treatment includes statins  The current treatment provides moderate improvement of lipids  There are no compliance problems  Risk factors for coronary artery disease include dyslipidemia  The following portions of the patient's history were reviewed and updated as appropriate:   She has a past medical history of Bladder stone, Hyperlipidemia, Stroke ([address] ), and Ureteral calculus, right ,  does not have any pertinent problems on file  ,   has a past surgical history that includes Gallbladder surgery  ,  family history includes Cancer in her maternal grandmother; No Known Problems in her maternal aunt, maternal aunt, maternal aunt, maternal aunt, maternal aunt, maternal aunt, maternal grandfather, paternal aunt, paternal aunt, paternal aunt, paternal grandfather, paternal grandmother, sister, and sister; Stomach cancer in her maternal grandmother; Stroke in her mother  ,   reports that she has never smoked  She has never used smokeless tobacco  She reports previous alcohol use  She reports that she does not use drugs  ,  is allergic to penicillin g and penicillins     Current Outpatient Medications   Medication Sig Dispense Refill    ascorbic acid (VITAMIN C) 500 mg tablet Take 500 mg by mouth daily      aspirin (ECOTRIN LOW STRENGTH) 81 mg EC tablet Take 1 tablet (81 mg total) by mouth daily 90 tablet 0    atorvastatin (LIPITOR) 20 mg tablet Take 1 tablet (20 mg total) by mouth daily 30 tablet 5    CRANBERRY PO Take 168 mg by mouth      Dextromethorphan-guaiFENesin (MUCINEX DM PO) Take by mouth      Lactobacillus (ACIDOPHILUS PO) Take by mouth      Multiple Vitamins-Minerals (CENTRUM PO) Take by mouth      omeprazole (PriLOSEC) 20 mg delayed release capsule Take 20 mg by mouth daily      VITAMIN E PO Take 184 mg by mouth       No current facility-administered medications for this visit  Review of Systems   Constitutional: Negative  Respiratory: Negative  Cardiovascular: Negative  Gastrointestinal: Negative  Genitourinary: Negative  Objective:  Vitals:    06/02/21 0814   BP: 134/80   Pulse: 97   Temp: 98 4 °F (36 9 °C)   SpO2: 96%   Weight: 66 5 kg (146 lb 9 6 oz)   Height: 5' 1" (1 549 m)     Body mass index is 27 7 kg/m²  Physical Exam  Vitals signs reviewed  Constitutional:       General: She is not in acute distress  Appearance: Normal appearance  She is well-developed  She is not diaphoretic  HENT:      Head: Normocephalic and atraumatic  Eyes:      Conjunctiva/sclera: Conjunctivae normal    Cardiovascular:      Rate and Rhythm: Normal rate and regular rhythm        Heart sounds: Normal heart sounds  No murmur  No friction rub  No gallop  Pulmonary:      Effort: Pulmonary effort is normal  No respiratory distress  Breath sounds: Normal breath sounds  No wheezing, rhonchi or rales  Musculoskeletal:      Right lower leg: No edema  Left lower leg: No edema  Neurological:      General: No focal deficit present  Mental Status: She is alert and oriented to person, place, and time  Psychiatric:         Mood and Affect: Mood normal          Behavior: Behavior normal          Thought Content: Thought content normal          Judgment: Judgment normal          BMI Counseling: Body mass index is 27 7 kg/m²  The BMI is above normal  Nutrition recommendations include reducing portion sizes, decreasing overall calorie intake, 3-5 servings of fruits/vegetables daily, reducing fast food intake, consuming healthier snacks, decreasing soda and/or juice intake, moderation in carbohydrate intake, increasing intake of lean protein, reducing intake of saturated fat and trans fat and reducing intake of cholesterol

## 2021-06-02 NOTE — PATIENT INSTRUCTIONS

## 2021-07-23 DIAGNOSIS — E78.49 OTHER HYPERLIPIDEMIA: ICD-10-CM

## 2021-07-23 RX ORDER — ATORVASTATIN CALCIUM 20 MG/1
20 TABLET, FILM COATED ORAL DAILY
Qty: 90 TABLET | Refills: 3 | Status: SHIPPED | OUTPATIENT
Start: 2021-07-23 | End: 2022-01-13 | Stop reason: SDUPTHER

## 2021-09-08 ENCOUNTER — OFFICE VISIT (OUTPATIENT)
Dept: FAMILY MEDICINE CLINIC | Facility: CLINIC | Age: 61
End: 2021-09-08
Payer: COMMERCIAL

## 2021-09-08 VITALS
HEIGHT: 61 IN | TEMPERATURE: 97.6 F | BODY MASS INDEX: 26.85 KG/M2 | DIASTOLIC BLOOD PRESSURE: 86 MMHG | SYSTOLIC BLOOD PRESSURE: 130 MMHG | WEIGHT: 142.2 LBS

## 2021-09-08 DIAGNOSIS — N30.01 ACUTE CYSTITIS WITH HEMATURIA: Primary | ICD-10-CM

## 2021-09-08 LAB
SL AMB  POCT GLUCOSE, UA: ABNORMAL
SL AMB LEUKOCYTE ESTERASE,UA: ABNORMAL
SL AMB POCT BILIRUBIN,UA: ABNORMAL
SL AMB POCT BLOOD,UA: ABNORMAL
SL AMB POCT CLARITY,UA: ABNORMAL
SL AMB POCT COLOR,UA: ABNORMAL
SL AMB POCT KETONES,UA: ABNORMAL
SL AMB POCT NITRITE,UA: ABNORMAL
SL AMB POCT PH,UA: 5.5
SL AMB POCT SPECIFIC GRAVITY,UA: 1.03
SL AMB POCT URINE PROTEIN: ABNORMAL
SL AMB POCT UROBILINOGEN: ABNORMAL

## 2021-09-08 PROCEDURE — 3008F BODY MASS INDEX DOCD: CPT | Performed by: FAMILY MEDICINE

## 2021-09-08 PROCEDURE — 81002 URINALYSIS NONAUTO W/O SCOPE: CPT | Performed by: FAMILY MEDICINE

## 2021-09-08 PROCEDURE — 1036F TOBACCO NON-USER: CPT | Performed by: FAMILY MEDICINE

## 2021-09-08 PROCEDURE — 99213 OFFICE O/P EST LOW 20 MIN: CPT | Performed by: FAMILY MEDICINE

## 2021-09-08 RX ORDER — SULFAMETHOXAZOLE AND TRIMETHOPRIM 800; 160 MG/1; MG/1
1 TABLET ORAL 2 TIMES DAILY
Qty: 10 TABLET | Refills: 0 | Status: SHIPPED | OUTPATIENT
Start: 2021-09-08 | End: 2021-09-13

## 2021-09-08 NOTE — PROGRESS NOTES
Assessment/Plan:   urinalysis did show some blood, protein and some bilirubin  Rx put in for Bactrim  Push fluids  She will recheck another UA next week  She will call us if symptoms continue or increase  Follow-up as scheduled in October p r n     Problem List Items Addressed This Visit     None      Visit Diagnoses     Acute cystitis with hematuria    -  Primary    Relevant Medications    sulfamethoxazole-trimethoprim (BACTRIM DS) 800-160 mg per tablet    Other Relevant Orders    POCT urine dip (Completed)    UA (URINE) with reflex to Scope           Diagnoses and all orders for this visit:    Acute cystitis with hematuria  -     POCT urine dip  -     sulfamethoxazole-trimethoprim (BACTRIM DS) 800-160 mg per tablet; Take 1 tablet by mouth 2 (two) times a day for 5 days  -     UA (URINE) with reflex to Scope        No problem-specific Assessment & Plan notes found for this encounter  Subjective:      Patient ID: Pelon Varner is a 59 y o  female  Patient here today stating that earlier this week started with some lower abdominal discomfort radiating into her low back  She noticed burning with urination  She also knows that her urine was a little darker than usual   She has to go more often  She does have to wake up more at night to pee  She denies any fever chills  No abdominal pain today  No nausea vomiting or diarrhea  Urinary Tract Infection   This is a new problem  The current episode started in the past 7 days  The problem occurs every urination  The problem has been gradually improving  The quality of the pain is described as burning  The pain is mild  There has been no fever  Associated symptoms include frequency  Pertinent negatives include no hematuria, nausea or vomiting  She has tried increased fluids for the symptoms  The treatment provided moderate relief         The following portions of the patient's history were reviewed and updated as appropriate:   She has a past medical history of Bladder stone, Hyperlipidemia, Stroke ([address] ), and Ureteral calculus, right ,  does not have any pertinent problems on file  ,   has a past surgical history that includes Gallbladder surgery  ,  family history includes Cancer in her maternal grandmother; No Known Problems in her maternal aunt, maternal aunt, maternal aunt, maternal aunt, maternal aunt, maternal aunt, maternal grandfather, paternal aunt, paternal aunt, paternal aunt, paternal grandfather, paternal grandmother, sister, and sister; Stomach cancer in her maternal grandmother; Stroke in her mother  ,   reports that she has never smoked  She has never used smokeless tobacco  She reports previous alcohol use  She reports that she does not use drugs  ,  is allergic to penicillin g and penicillins     Current Outpatient Medications   Medication Sig Dispense Refill    ascorbic acid (VITAMIN C) 500 mg tablet Take 500 mg by mouth daily      aspirin (ECOTRIN LOW STRENGTH) 81 mg EC tablet Take 1 tablet (81 mg total) by mouth daily 90 tablet 0    atorvastatin (LIPITOR) 20 mg tablet Take 1 tablet (20 mg total) by mouth daily 90 tablet 3    Dextromethorphan-guaiFENesin (MUCINEX DM PO) Take by mouth      Multiple Vitamins-Minerals (CENTRUM PO) Take by mouth      omeprazole (PriLOSEC) 20 mg delayed release capsule Take 20 mg by mouth daily      CRANBERRY PO Take 168 mg by mouth (Patient not taking: Reported on 9/8/2021)      Lactobacillus (ACIDOPHILUS PO) Take by mouth (Patient not taking: Reported on 9/8/2021)      sulfamethoxazole-trimethoprim (BACTRIM DS) 800-160 mg per tablet Take 1 tablet by mouth 2 (two) times a day for 5 days 10 tablet 0    VITAMIN E PO Take 184 mg by mouth (Patient not taking: Reported on 9/8/2021)       No current facility-administered medications for this visit  Review of Systems   Constitutional: Negative  Cardiovascular: Negative  Gastrointestinal: Negative  Negative for nausea and vomiting  Genitourinary: Positive for dysuria and frequency  Negative for hematuria  Objective:  Vitals:    09/08/21 1331   BP: 130/86   Temp: 97 6 °F (36 4 °C)   Weight: 64 5 kg (142 lb 3 2 oz)   Height: 5' 1" (1 549 m)     Body mass index is 26 87 kg/m²  Physical Exam  Vitals reviewed  Constitutional:       General: She is not in acute distress  Appearance: Normal appearance  She is well-developed  She is not ill-appearing, toxic-appearing or diaphoretic  HENT:      Head: Normocephalic and atraumatic  Eyes:      Conjunctiva/sclera: Conjunctivae normal    Cardiovascular:      Rate and Rhythm: Normal rate and regular rhythm  Heart sounds: Normal heart sounds  No murmur heard  No friction rub  No gallop  Pulmonary:      Effort: Pulmonary effort is normal  No respiratory distress  Breath sounds: Normal breath sounds  No wheezing, rhonchi or rales  Abdominal:      Tenderness: There is no right CVA tenderness or left CVA tenderness  Musculoskeletal:      Right lower leg: No edema  Left lower leg: No edema  Neurological:      General: No focal deficit present  Mental Status: She is alert and oriented to person, place, and time  Psychiatric:         Mood and Affect: Mood normal          Behavior: Behavior normal          Thought Content:  Thought content normal          Judgment: Judgment normal

## 2021-09-16 ENCOUNTER — APPOINTMENT (OUTPATIENT)
Dept: LAB | Facility: MEDICAL CENTER | Age: 61
End: 2021-09-16
Payer: COMMERCIAL

## 2021-09-16 DIAGNOSIS — E78.49 OTHER HYPERLIPIDEMIA: ICD-10-CM

## 2021-09-16 LAB
ALBUMIN SERPL BCP-MCNC: 4 G/DL (ref 3.5–5)
ALP SERPL-CCNC: 107 U/L (ref 46–116)
ALT SERPL W P-5'-P-CCNC: 20 U/L (ref 12–78)
ANION GAP SERPL CALCULATED.3IONS-SCNC: 6 MMOL/L (ref 4–13)
AST SERPL W P-5'-P-CCNC: 20 U/L (ref 5–45)
BACTERIA UR QL AUTO: ABNORMAL /HPF
BILIRUB SERPL-MCNC: 0.48 MG/DL (ref 0.2–1)
BILIRUB UR QL STRIP: ABNORMAL
BUN SERPL-MCNC: 27 MG/DL (ref 5–25)
CALCIUM SERPL-MCNC: 9.7 MG/DL (ref 8.3–10.1)
CAOX CRY URNS QL MICRO: ABNORMAL /HPF
CHLORIDE SERPL-SCNC: 109 MMOL/L (ref 100–108)
CHOLEST SERPL-MCNC: 164 MG/DL (ref 50–200)
CLARITY UR: ABNORMAL
CO2 SERPL-SCNC: 24 MMOL/L (ref 21–32)
COLOR UR: YELLOW
CREAT SERPL-MCNC: 0.75 MG/DL (ref 0.6–1.3)
GFR SERPL CREATININE-BSD FRML MDRD: 85 ML/MIN/1.73SQ M
GLUCOSE P FAST SERPL-MCNC: 95 MG/DL (ref 65–99)
GLUCOSE UR STRIP-MCNC: NEGATIVE MG/DL
HDLC SERPL-MCNC: 73 MG/DL
HGB UR QL STRIP.AUTO: ABNORMAL
KETONES UR STRIP-MCNC: NEGATIVE MG/DL
LDLC SERPL CALC-MCNC: 73 MG/DL (ref 0–100)
LEUKOCYTE ESTERASE UR QL STRIP: ABNORMAL
NITRITE UR QL STRIP: NEGATIVE
NON-SQ EPI CELLS URNS QL MICRO: ABNORMAL /HPF
PH UR STRIP.AUTO: 5.5 [PH]
POTASSIUM SERPL-SCNC: 3.9 MMOL/L (ref 3.5–5.3)
PROT SERPL-MCNC: 7.9 G/DL (ref 6.4–8.2)
PROT UR STRIP-MCNC: ABNORMAL MG/DL
RBC #/AREA URNS AUTO: ABNORMAL /HPF
SODIUM SERPL-SCNC: 139 MMOL/L (ref 136–145)
SP GR UR STRIP.AUTO: 1.03 (ref 1–1.03)
TRIGL SERPL-MCNC: 92 MG/DL
UROBILINOGEN UR QL STRIP.AUTO: 0.2 E.U./DL
WBC #/AREA URNS AUTO: ABNORMAL /HPF

## 2021-09-16 PROCEDURE — 80061 LIPID PANEL: CPT

## 2021-09-16 PROCEDURE — 80053 COMPREHEN METABOLIC PANEL: CPT

## 2021-09-16 PROCEDURE — 81001 URINALYSIS AUTO W/SCOPE: CPT | Performed by: FAMILY MEDICINE

## 2021-09-16 PROCEDURE — 36415 COLL VENOUS BLD VENIPUNCTURE: CPT

## 2021-09-21 ENCOUNTER — HOSPITAL ENCOUNTER (OUTPATIENT)
Dept: MAMMOGRAPHY | Facility: HOSPITAL | Age: 61
Discharge: HOME | End: 2021-09-21
Attending: OBSTETRICS & GYNECOLOGY
Payer: COMMERCIAL

## 2021-09-21 VITALS — HEIGHT: 61 IN | WEIGHT: 142 LBS | BODY MASS INDEX: 26.81 KG/M2

## 2021-09-21 DIAGNOSIS — Z12.31 ENCOUNTER FOR SCREENING MAMMOGRAM FOR MALIGNANT NEOPLASM OF BREAST: ICD-10-CM

## 2021-09-21 PROCEDURE — 77067 SCR MAMMO BI INCL CAD: CPT

## 2021-09-21 PROCEDURE — 77063 BREAST TOMOSYNTHESIS BI: CPT

## 2021-10-01 ENCOUNTER — OFFICE VISIT (OUTPATIENT)
Dept: FAMILY MEDICINE CLINIC | Facility: CLINIC | Age: 61
End: 2021-10-01
Payer: COMMERCIAL

## 2021-10-01 VITALS
HEART RATE: 86 BPM | SYSTOLIC BLOOD PRESSURE: 134 MMHG | HEIGHT: 61 IN | WEIGHT: 144.8 LBS | BODY MASS INDEX: 27.34 KG/M2 | TEMPERATURE: 97.8 F | DIASTOLIC BLOOD PRESSURE: 90 MMHG | OXYGEN SATURATION: 96 %

## 2021-10-01 DIAGNOSIS — Z23 NEED FOR INFLUENZA VACCINATION: ICD-10-CM

## 2021-10-01 DIAGNOSIS — N39.0 RECURRENT UTI: ICD-10-CM

## 2021-10-01 DIAGNOSIS — N30.01 ACUTE CYSTITIS WITH HEMATURIA: Primary | ICD-10-CM

## 2021-10-01 LAB
SL AMB  POCT GLUCOSE, UA: ABNORMAL
SL AMB LEUKOCYTE ESTERASE,UA: ABNORMAL
SL AMB POCT BILIRUBIN,UA: ABNORMAL
SL AMB POCT BLOOD,UA: ABNORMAL
SL AMB POCT CLARITY,UA: CLEAR
SL AMB POCT COLOR,UA: YELLOW
SL AMB POCT KETONES,UA: ABNORMAL
SL AMB POCT NITRITE,UA: ABNORMAL
SL AMB POCT PH,UA: 6
SL AMB POCT SPECIFIC GRAVITY,UA: 1.02
SL AMB POCT URINE PROTEIN: ABNORMAL
SL AMB POCT UROBILINOGEN: 0.2

## 2021-10-01 PROCEDURE — 99213 OFFICE O/P EST LOW 20 MIN: CPT | Performed by: FAMILY MEDICINE

## 2021-10-01 PROCEDURE — 81002 URINALYSIS NONAUTO W/O SCOPE: CPT | Performed by: FAMILY MEDICINE

## 2021-10-01 PROCEDURE — 90471 IMMUNIZATION ADMIN: CPT | Performed by: FAMILY MEDICINE

## 2021-10-01 PROCEDURE — 90682 RIV4 VACC RECOMBINANT DNA IM: CPT | Performed by: FAMILY MEDICINE

## 2021-10-01 RX ORDER — CIPROFLOXACIN 500 MG/1
500 TABLET, FILM COATED ORAL EVERY 12 HOURS SCHEDULED
Qty: 14 TABLET | Refills: 0 | Status: SHIPPED | OUTPATIENT
Start: 2021-10-01 | End: 2021-10-08

## 2021-10-06 ENCOUNTER — RA CDI HCC (OUTPATIENT)
Dept: OTHER | Facility: HOSPITAL | Age: 61
End: 2021-10-06

## 2021-10-12 ENCOUNTER — APPOINTMENT (OUTPATIENT)
Dept: LAB | Facility: MEDICAL CENTER | Age: 61
End: 2021-10-12
Payer: COMMERCIAL

## 2021-10-12 LAB
BACTERIA UR QL AUTO: ABNORMAL /HPF
BILIRUB UR QL STRIP: NEGATIVE
CLARITY UR: CLEAR
COLOR UR: YELLOW
GLUCOSE UR STRIP-MCNC: NEGATIVE MG/DL
HGB UR QL STRIP.AUTO: ABNORMAL
HYALINE CASTS #/AREA URNS LPF: ABNORMAL /LPF
KETONES UR STRIP-MCNC: NEGATIVE MG/DL
LEUKOCYTE ESTERASE UR QL STRIP: NEGATIVE
NITRITE UR QL STRIP: NEGATIVE
NON-SQ EPI CELLS URNS QL MICRO: ABNORMAL /HPF
PH UR STRIP.AUTO: 5.5 [PH]
PROT UR STRIP-MCNC: NEGATIVE MG/DL
RBC #/AREA URNS AUTO: ABNORMAL /HPF
SP GR UR STRIP.AUTO: 1.03 (ref 1–1.03)
UROBILINOGEN UR QL STRIP.AUTO: 0.2 E.U./DL
WBC #/AREA URNS AUTO: ABNORMAL /HPF

## 2021-10-12 PROCEDURE — 81001 URINALYSIS AUTO W/SCOPE: CPT | Performed by: FAMILY MEDICINE

## 2021-10-13 ENCOUNTER — OFFICE VISIT (OUTPATIENT)
Dept: FAMILY MEDICINE CLINIC | Facility: CLINIC | Age: 61
End: 2021-10-13
Payer: COMMERCIAL

## 2021-10-13 ENCOUNTER — APPOINTMENT (OUTPATIENT)
Dept: LAB | Facility: MEDICAL CENTER | Age: 61
End: 2021-10-13
Payer: COMMERCIAL

## 2021-10-13 VITALS
TEMPERATURE: 97.4 F | WEIGHT: 143.4 LBS | HEIGHT: 61 IN | OXYGEN SATURATION: 98 % | SYSTOLIC BLOOD PRESSURE: 146 MMHG | BODY MASS INDEX: 27.08 KG/M2 | HEART RATE: 87 BPM | DIASTOLIC BLOOD PRESSURE: 100 MMHG

## 2021-10-13 DIAGNOSIS — R31.29 MICROSCOPIC HEMATURIA: ICD-10-CM

## 2021-10-13 DIAGNOSIS — N30.01 ACUTE CYSTITIS WITH HEMATURIA: ICD-10-CM

## 2021-10-13 DIAGNOSIS — R03.0 ELEVATED BLOOD PRESSURE READING: Primary | ICD-10-CM

## 2021-10-13 DIAGNOSIS — E78.49 OTHER HYPERLIPIDEMIA: ICD-10-CM

## 2021-10-13 DIAGNOSIS — N39.0 RECURRENT UTI: ICD-10-CM

## 2021-10-13 PROCEDURE — 88112 CYTOPATH CELL ENHANCE TECH: CPT | Performed by: SPECIALIST

## 2021-10-13 PROCEDURE — 1036F TOBACCO NON-USER: CPT | Performed by: FAMILY MEDICINE

## 2021-10-13 PROCEDURE — 99213 OFFICE O/P EST LOW 20 MIN: CPT | Performed by: FAMILY MEDICINE

## 2021-10-13 PROCEDURE — 3008F BODY MASS INDEX DOCD: CPT | Performed by: FAMILY MEDICINE

## 2021-11-01 ENCOUNTER — ANNUAL EXAM (OUTPATIENT)
Dept: OBGYN CLINIC | Facility: CLINIC | Age: 61
End: 2021-11-01
Payer: COMMERCIAL

## 2021-11-01 VITALS
WEIGHT: 143 LBS | DIASTOLIC BLOOD PRESSURE: 80 MMHG | SYSTOLIC BLOOD PRESSURE: 120 MMHG | HEIGHT: 61 IN | BODY MASS INDEX: 27 KG/M2

## 2021-11-01 DIAGNOSIS — Z12.31 ENCOUNTER FOR SCREENING MAMMOGRAM FOR MALIGNANT NEOPLASM OF BREAST: ICD-10-CM

## 2021-11-01 DIAGNOSIS — Z01.419 ENCOUNTER FOR WELL WOMAN EXAM WITH ROUTINE GYNECOLOGICAL EXAM: Primary | ICD-10-CM

## 2021-11-01 PROCEDURE — S0612 ANNUAL GYNECOLOGICAL EXAMINA: HCPCS | Performed by: OBSTETRICS & GYNECOLOGY

## 2021-11-04 ENCOUNTER — OFFICE VISIT (OUTPATIENT)
Dept: UROLOGY | Facility: CLINIC | Age: 61
End: 2021-11-04
Payer: COMMERCIAL

## 2021-11-04 VITALS
HEART RATE: 79 BPM | BODY MASS INDEX: 26.43 KG/M2 | DIASTOLIC BLOOD PRESSURE: 70 MMHG | WEIGHT: 140 LBS | HEIGHT: 61 IN | SYSTOLIC BLOOD PRESSURE: 120 MMHG

## 2021-11-04 DIAGNOSIS — R31.29 MICROSCOPIC HEMATURIA: ICD-10-CM

## 2021-11-04 LAB
SL AMB  POCT GLUCOSE, UA: ABNORMAL
SL AMB LEUKOCYTE ESTERASE,UA: ABNORMAL
SL AMB POCT BILIRUBIN,UA: ABNORMAL
SL AMB POCT BLOOD,UA: ABNORMAL
SL AMB POCT CLARITY,UA: CLEAR
SL AMB POCT COLOR,UA: YELLOW
SL AMB POCT KETONES,UA: ABNORMAL
SL AMB POCT NITRITE,UA: ABNORMAL
SL AMB POCT PH,UA: ABNORMAL
SL AMB POCT SPECIFIC GRAVITY,UA: 1.01
SL AMB POCT URINE PROTEIN: ABNORMAL
SL AMB POCT UROBILINOGEN: ABNORMAL

## 2021-11-04 PROCEDURE — 1036F TOBACCO NON-USER: CPT | Performed by: NURSE PRACTITIONER

## 2021-11-04 PROCEDURE — 81002 URINALYSIS NONAUTO W/O SCOPE: CPT | Performed by: NURSE PRACTITIONER

## 2021-11-04 PROCEDURE — 3008F BODY MASS INDEX DOCD: CPT | Performed by: NURSE PRACTITIONER

## 2021-11-04 PROCEDURE — 99242 OFF/OP CONSLTJ NEW/EST SF 20: CPT | Performed by: NURSE PRACTITIONER

## 2021-11-05 ENCOUNTER — TELEPHONE (OUTPATIENT)
Dept: UROLOGY | Facility: MEDICAL CENTER | Age: 61
End: 2021-11-05

## 2021-11-09 ENCOUNTER — IMMUNIZATIONS (OUTPATIENT)
Dept: FAMILY MEDICINE CLINIC | Facility: HOSPITAL | Age: 61
End: 2021-11-09

## 2021-11-09 DIAGNOSIS — Z23 ENCOUNTER FOR IMMUNIZATION: Primary | ICD-10-CM

## 2021-11-09 PROCEDURE — 91306 COVID-19 MODERNA VACC 0.25 ML BOOSTER: CPT

## 2021-11-09 PROCEDURE — 0013A COVID-19 MODERNA VACC 0.25 ML BOOSTER: CPT

## 2021-11-10 ENCOUNTER — REMOTE DEVICE CLINIC VISIT (OUTPATIENT)
Dept: CARDIOLOGY CLINIC | Facility: CLINIC | Age: 61
End: 2021-11-10
Payer: COMMERCIAL

## 2021-11-10 DIAGNOSIS — Z95.818 PRESENCE OF OTHER CARDIAC IMPLANTS AND GRAFTS: Primary | ICD-10-CM

## 2021-11-10 PROCEDURE — 93298 REM INTERROG DEV EVAL SCRMS: CPT | Performed by: INTERNAL MEDICINE

## 2021-11-10 PROCEDURE — G2066 INTER DEVC REMOTE 30D: HCPCS | Performed by: INTERNAL MEDICINE

## 2021-11-11 ENCOUNTER — HOSPITAL ENCOUNTER (OUTPATIENT)
Dept: CT IMAGING | Facility: HOSPITAL | Age: 61
Discharge: HOME | End: 2021-11-11
Payer: COMMERCIAL

## 2021-11-11 DIAGNOSIS — R31.29 MICROSCOPIC HEMATURIA: ICD-10-CM

## 2021-11-11 PROCEDURE — G1004 CDSM NDSC: HCPCS

## 2021-11-11 PROCEDURE — 74178 CT ABD&PLV WO CNTR FLWD CNTR: CPT

## 2021-11-11 RX ADMIN — IOHEXOL 100 ML: 350 INJECTION, SOLUTION INTRAVENOUS at 14:18

## 2021-11-16 ENCOUNTER — TELEPHONE (OUTPATIENT)
Dept: UROLOGY | Facility: CLINIC | Age: 61
End: 2021-11-16

## 2021-11-19 ENCOUNTER — PREP FOR PROCEDURE (OUTPATIENT)
Dept: UROLOGY | Facility: CLINIC | Age: 61
End: 2021-11-19

## 2021-11-19 DIAGNOSIS — R31.29 MICROSCOPIC HEMATURIA: Primary | ICD-10-CM

## 2021-11-19 DIAGNOSIS — N20.0 NEPHROLITHIASIS: ICD-10-CM

## 2021-11-19 RX ORDER — CIPROFLOXACIN 2 MG/ML
400 INJECTION, SOLUTION INTRAVENOUS ONCE
Status: CANCELLED | OUTPATIENT
Start: 2021-11-19 | End: 2021-11-19

## 2021-12-13 ENCOUNTER — NURSE TRIAGE (OUTPATIENT)
Dept: OTHER | Facility: OTHER | Age: 61
End: 2021-12-13

## 2021-12-13 DIAGNOSIS — N20.0 NEPHROLITHIASIS: Primary | ICD-10-CM

## 2021-12-13 RX ORDER — TAMSULOSIN HYDROCHLORIDE 0.4 MG/1
0.4 CAPSULE ORAL
Qty: 30 CAPSULE | Refills: 1 | Status: SHIPPED | OUTPATIENT
Start: 2021-12-13 | End: 2022-02-10

## 2021-12-13 RX ORDER — HYDROCODONE BITARTRATE AND ACETAMINOPHEN 5; 325 MG/1; MG/1
1 TABLET ORAL EVERY 6 HOURS PRN
Qty: 20 TABLET | Refills: 0 | Status: SHIPPED | OUTPATIENT
Start: 2021-12-13 | End: 2022-02-10

## 2021-12-14 ENCOUNTER — TELEPHONE (OUTPATIENT)
Dept: UROLOGY | Facility: CLINIC | Age: 61
End: 2021-12-14

## 2021-12-14 ENCOUNTER — APPOINTMENT (OUTPATIENT)
Dept: LAB | Facility: HOSPITAL | Age: 61
End: 2021-12-14
Attending: UROLOGY
Payer: COMMERCIAL

## 2021-12-14 ENCOUNTER — HOSPITAL ENCOUNTER (OUTPATIENT)
Dept: NON INVASIVE DIAGNOSTICS | Facility: HOSPITAL | Age: 61
Discharge: HOME | End: 2021-12-14
Attending: UROLOGY
Payer: COMMERCIAL

## 2021-12-14 ENCOUNTER — PREP FOR PROCEDURE (OUTPATIENT)
Dept: UROLOGY | Facility: CLINIC | Age: 61
End: 2021-12-14

## 2021-12-14 DIAGNOSIS — R31.29 MICROSCOPIC HEMATURIA: ICD-10-CM

## 2021-12-14 DIAGNOSIS — N20.0 NEPHROLITHIASIS: ICD-10-CM

## 2021-12-14 DIAGNOSIS — R31.29 MICROSCOPIC HEMATURIA: Primary | ICD-10-CM

## 2021-12-14 PROCEDURE — 87086 URINE CULTURE/COLONY COUNT: CPT

## 2021-12-14 PROCEDURE — 93005 ELECTROCARDIOGRAM TRACING: CPT

## 2021-12-15 LAB
ATRIAL RATE: 101 BPM
BACTERIA UR CULT: NORMAL
P AXIS: 59 DEGREES
PR INTERVAL: 152 MS
QRS AXIS: 36 DEGREES
QRSD INTERVAL: 62 MS
QT INTERVAL: 302 MS
QTC INTERVAL: 391 MS
T WAVE AXIS: 24 DEGREES
VENTRICULAR RATE: 101 BPM

## 2021-12-15 PROCEDURE — 93010 ELECTROCARDIOGRAM REPORT: CPT | Performed by: INTERNAL MEDICINE

## 2021-12-16 ENCOUNTER — ANESTHESIA EVENT (OUTPATIENT)
Dept: PERIOP | Facility: HOSPITAL | Age: 61
End: 2021-12-16
Payer: COMMERCIAL

## 2021-12-17 ENCOUNTER — TELEPHONE (OUTPATIENT)
Dept: UROLOGY | Facility: MEDICAL CENTER | Age: 61
End: 2021-12-17

## 2021-12-17 ENCOUNTER — HOSPITAL ENCOUNTER (OUTPATIENT)
Facility: HOSPITAL | Age: 61
Setting detail: OUTPATIENT SURGERY
Discharge: HOME | End: 2021-12-17
Attending: UROLOGY | Admitting: UROLOGY
Payer: COMMERCIAL

## 2021-12-17 ENCOUNTER — APPOINTMENT (OUTPATIENT)
Dept: RADIOLOGY | Facility: HOSPITAL | Age: 61
End: 2021-12-17
Payer: COMMERCIAL

## 2021-12-17 ENCOUNTER — ANESTHESIA (OUTPATIENT)
Dept: PERIOP | Facility: HOSPITAL | Age: 61
End: 2021-12-17
Payer: COMMERCIAL

## 2021-12-17 VITALS
RESPIRATION RATE: 16 BRPM | TEMPERATURE: 97.7 F | SYSTOLIC BLOOD PRESSURE: 163 MMHG | OXYGEN SATURATION: 95 % | DIASTOLIC BLOOD PRESSURE: 93 MMHG | HEART RATE: 77 BPM

## 2021-12-17 DIAGNOSIS — N20.0 NEPHROLITHIASIS: ICD-10-CM

## 2021-12-17 DIAGNOSIS — R31.29 MICROSCOPIC HEMATURIA: ICD-10-CM

## 2021-12-17 PROCEDURE — C1769 GUIDE WIRE: HCPCS | Performed by: UROLOGY

## 2021-12-17 PROCEDURE — 52356 CYSTO/URETERO W/LITHOTRIPSY: CPT | Performed by: UROLOGY

## 2021-12-17 PROCEDURE — C1758 CATHETER, URETERAL: HCPCS | Performed by: UROLOGY

## 2021-12-17 PROCEDURE — 82360 CALCULUS ASSAY QUANT: CPT | Performed by: UROLOGY

## 2021-12-17 PROCEDURE — 74420 UROGRAPHY RTRGR +-KUB: CPT

## 2021-12-17 PROCEDURE — NC001 PR NO CHARGE: Performed by: UROLOGY

## 2021-12-17 PROCEDURE — C2617 STENT, NON-COR, TEM W/O DEL: HCPCS | Performed by: UROLOGY

## 2021-12-17 DEVICE — VARIABLE LENGTH INJECTION STENT SET
Type: IMPLANTABLE DEVICE | Site: URETER | Status: FUNCTIONAL
Brand: CONTOUR VL™ INJECTION STENT SET

## 2021-12-17 RX ORDER — NEOSTIGMINE METHYLSULFATE 1 MG/ML
INJECTION INTRAVENOUS AS NEEDED
Status: DISCONTINUED | OUTPATIENT
Start: 2021-12-17 | End: 2021-12-17

## 2021-12-17 RX ORDER — CIPROFLOXACIN 500 MG/1
500 TABLET, FILM COATED ORAL 2 TIMES DAILY
Qty: 6 TABLET | Refills: 0 | Status: SHIPPED | OUTPATIENT
Start: 2021-12-17 | End: 2021-12-20

## 2021-12-17 RX ORDER — ONDANSETRON 2 MG/ML
INJECTION INTRAMUSCULAR; INTRAVENOUS AS NEEDED
Status: DISCONTINUED | OUTPATIENT
Start: 2021-12-17 | End: 2021-12-17

## 2021-12-17 RX ORDER — KETOROLAC TROMETHAMINE 30 MG/ML
15 INJECTION, SOLUTION INTRAMUSCULAR; INTRAVENOUS EVERY 6 HOURS SCHEDULED
Status: DISCONTINUED | OUTPATIENT
Start: 2021-12-17 | End: 2021-12-17 | Stop reason: HOSPADM

## 2021-12-17 RX ORDER — CIPROFLOXACIN 2 MG/ML
400 INJECTION, SOLUTION INTRAVENOUS ONCE
Status: DISCONTINUED | OUTPATIENT
Start: 2021-12-17 | End: 2021-12-17 | Stop reason: HOSPADM

## 2021-12-17 RX ORDER — PHENAZOPYRIDINE HYDROCHLORIDE 100 MG/1
200 TABLET, FILM COATED ORAL ONCE AS NEEDED
Status: DISCONTINUED | OUTPATIENT
Start: 2021-12-17 | End: 2021-12-17 | Stop reason: HOSPADM

## 2021-12-17 RX ORDER — CIPROFLOXACIN 2 MG/ML
INJECTION, SOLUTION INTRAVENOUS CONTINUOUS PRN
Status: DISCONTINUED | OUTPATIENT
Start: 2021-12-17 | End: 2021-12-17

## 2021-12-17 RX ORDER — SODIUM CHLORIDE, SODIUM LACTATE, POTASSIUM CHLORIDE, CALCIUM CHLORIDE 600; 310; 30; 20 MG/100ML; MG/100ML; MG/100ML; MG/100ML
125 INJECTION, SOLUTION INTRAVENOUS CONTINUOUS
Status: DISCONTINUED | OUTPATIENT
Start: 2021-12-17 | End: 2021-12-17 | Stop reason: HOSPADM

## 2021-12-17 RX ORDER — HYDROMORPHONE HCL/PF 1 MG/ML
0.5 SYRINGE (ML) INJECTION
Status: DISCONTINUED | OUTPATIENT
Start: 2021-12-17 | End: 2021-12-17 | Stop reason: HOSPADM

## 2021-12-17 RX ORDER — FENTANYL CITRATE/PF 50 MCG/ML
25 SYRINGE (ML) INJECTION
Status: DISCONTINUED | OUTPATIENT
Start: 2021-12-17 | End: 2021-12-17 | Stop reason: HOSPADM

## 2021-12-17 RX ORDER — OXYCODONE HYDROCHLORIDE 5 MG/1
5 TABLET ORAL EVERY 4 HOURS PRN
Status: DISCONTINUED | OUTPATIENT
Start: 2021-12-17 | End: 2021-12-17 | Stop reason: HOSPADM

## 2021-12-17 RX ORDER — LIDOCAINE HYDROCHLORIDE 10 MG/ML
INJECTION, SOLUTION EPIDURAL; INFILTRATION; INTRACAUDAL; PERINEURAL AS NEEDED
Status: DISCONTINUED | OUTPATIENT
Start: 2021-12-17 | End: 2021-12-17

## 2021-12-17 RX ORDER — MAGNESIUM HYDROXIDE 1200 MG/15ML
LIQUID ORAL AS NEEDED
Status: DISCONTINUED | OUTPATIENT
Start: 2021-12-17 | End: 2021-12-17 | Stop reason: HOSPADM

## 2021-12-17 RX ORDER — GLYCOPYRROLATE 0.2 MG/ML
INJECTION INTRAMUSCULAR; INTRAVENOUS AS NEEDED
Status: DISCONTINUED | OUTPATIENT
Start: 2021-12-17 | End: 2021-12-17

## 2021-12-17 RX ORDER — KETOROLAC TROMETHAMINE 30 MG/ML
INJECTION, SOLUTION INTRAMUSCULAR; INTRAVENOUS AS NEEDED
Status: DISCONTINUED | OUTPATIENT
Start: 2021-12-17 | End: 2021-12-17

## 2021-12-17 RX ORDER — ONDANSETRON 2 MG/ML
4 INJECTION INTRAMUSCULAR; INTRAVENOUS EVERY 6 HOURS PRN
Status: DISCONTINUED | OUTPATIENT
Start: 2021-12-17 | End: 2021-12-17 | Stop reason: HOSPADM

## 2021-12-17 RX ORDER — FENTANYL CITRATE 50 UG/ML
INJECTION, SOLUTION INTRAMUSCULAR; INTRAVENOUS AS NEEDED
Status: DISCONTINUED | OUTPATIENT
Start: 2021-12-17 | End: 2021-12-17

## 2021-12-17 RX ORDER — MIDAZOLAM HYDROCHLORIDE 2 MG/2ML
INJECTION, SOLUTION INTRAMUSCULAR; INTRAVENOUS AS NEEDED
Status: DISCONTINUED | OUTPATIENT
Start: 2021-12-17 | End: 2021-12-17

## 2021-12-17 RX ORDER — ONDANSETRON 2 MG/ML
4 INJECTION INTRAMUSCULAR; INTRAVENOUS ONCE AS NEEDED
Status: DISCONTINUED | OUTPATIENT
Start: 2021-12-17 | End: 2021-12-17 | Stop reason: HOSPADM

## 2021-12-17 RX ORDER — KETOROLAC TROMETHAMINE 10 MG/1
10 TABLET, FILM COATED ORAL EVERY 6 HOURS PRN
Qty: 20 TABLET | Refills: 0 | Status: SHIPPED | OUTPATIENT
Start: 2021-12-17 | End: 2021-12-22

## 2021-12-17 RX ORDER — ROCURONIUM BROMIDE 10 MG/ML
INJECTION, SOLUTION INTRAVENOUS AS NEEDED
Status: DISCONTINUED | OUTPATIENT
Start: 2021-12-17 | End: 2021-12-17

## 2021-12-17 RX ORDER — DEXAMETHASONE SODIUM PHOSPHATE 10 MG/ML
INJECTION, SOLUTION INTRAMUSCULAR; INTRAVENOUS AS NEEDED
Status: DISCONTINUED | OUTPATIENT
Start: 2021-12-17 | End: 2021-12-17

## 2021-12-17 RX ORDER — PROPOFOL 10 MG/ML
INJECTION, EMULSION INTRAVENOUS AS NEEDED
Status: DISCONTINUED | OUTPATIENT
Start: 2021-12-17 | End: 2021-12-17

## 2021-12-17 RX ORDER — ACETAMINOPHEN 325 MG/1
650 TABLET ORAL EVERY 6 HOURS PRN
Status: DISCONTINUED | OUTPATIENT
Start: 2021-12-17 | End: 2021-12-17 | Stop reason: HOSPADM

## 2021-12-17 RX ORDER — LIDOCAINE HYDROCHLORIDE 10 MG/ML
0.5 INJECTION, SOLUTION EPIDURAL; INFILTRATION; INTRACAUDAL; PERINEURAL ONCE
Status: COMPLETED | OUTPATIENT
Start: 2021-12-17 | End: 2021-12-17

## 2021-12-17 RX ADMIN — PROPOFOL 50 MG: 10 INJECTION, EMULSION INTRAVENOUS at 12:35

## 2021-12-17 RX ADMIN — ROCURONIUM BROMIDE 40 MG: 50 INJECTION, SOLUTION INTRAVENOUS at 12:36

## 2021-12-17 RX ADMIN — FENTANYL CITRATE 50 MCG: 50 INJECTION INTRAMUSCULAR; INTRAVENOUS at 12:34

## 2021-12-17 RX ADMIN — CIPROFLOXACIN: 2 INJECTION, SOLUTION INTRAVENOUS at 12:30

## 2021-12-17 RX ADMIN — LIDOCAINE HYDROCHLORIDE 50 MG: 10 INJECTION, SOLUTION EPIDURAL; INFILTRATION; INTRACAUDAL; PERINEURAL at 12:35

## 2021-12-17 RX ADMIN — NEOSTIGMINE METHYLSULFATE 3 MG: 1 INJECTION INTRAVENOUS at 13:29

## 2021-12-17 RX ADMIN — GLYCOPYRROLATE 0.4 MG: 0.2 INJECTION, SOLUTION INTRAMUSCULAR; INTRAVENOUS at 13:29

## 2021-12-17 RX ADMIN — LIDOCAINE HYDROCHLORIDE 0.2 ML: 10 INJECTION, SOLUTION EPIDURAL; INFILTRATION; INTRACAUDAL; PERINEURAL at 11:33

## 2021-12-17 RX ADMIN — FENTANYL CITRATE 50 MCG: 50 INJECTION INTRAMUSCULAR; INTRAVENOUS at 12:56

## 2021-12-17 RX ADMIN — DEXAMETHASONE SODIUM PHOSPHATE 10 MG: 10 INJECTION, SOLUTION INTRAMUSCULAR; INTRAVENOUS at 12:40

## 2021-12-17 RX ADMIN — ONDANSETRON 4 MG: 2 INJECTION INTRAMUSCULAR; INTRAVENOUS at 13:29

## 2021-12-17 RX ADMIN — SODIUM CHLORIDE, SODIUM LACTATE, POTASSIUM CHLORIDE, AND CALCIUM CHLORIDE 125 ML/HR: .6; .31; .03; .02 INJECTION, SOLUTION INTRAVENOUS at 11:33

## 2021-12-17 RX ADMIN — KETOROLAC TROMETHAMINE 15 MG: 30 INJECTION, SOLUTION INTRAMUSCULAR at 13:31

## 2021-12-17 RX ADMIN — MIDAZOLAM 2 MG: 1 INJECTION INTRAMUSCULAR; INTRAVENOUS at 12:27

## 2021-12-20 ENCOUNTER — TELEPHONE (OUTPATIENT)
Dept: OTHER | Facility: OTHER | Age: 61
End: 2021-12-20

## 2021-12-22 LAB
CALCIUM OXALATE DIHYDRATE MFR STONE IR: 90 %
COLOR STONE: NORMAL
COM MFR STONE: 10 %
COMMENT-STONE3: NORMAL
COMPOSITION: NORMAL
LABORATORY COMMENT REPORT: NORMAL
PHOTO: NORMAL
SIZE STONE: NORMAL MM
SPEC SOURCE SUBJ: NORMAL
STONE ANALYSIS-IMP: NORMAL
WT STONE: 8 MG

## 2021-12-27 ENCOUNTER — PROCEDURE VISIT (OUTPATIENT)
Dept: UROLOGY | Facility: MEDICAL CENTER | Age: 61
End: 2021-12-27
Payer: COMMERCIAL

## 2021-12-27 VITALS
SYSTOLIC BLOOD PRESSURE: 128 MMHG | WEIGHT: 138 LBS | HEART RATE: 78 BPM | HEIGHT: 61 IN | BODY MASS INDEX: 26.06 KG/M2 | DIASTOLIC BLOOD PRESSURE: 80 MMHG

## 2021-12-27 DIAGNOSIS — N20.0 NEPHROLITHIASIS: Primary | ICD-10-CM

## 2021-12-27 PROCEDURE — 99211 OFF/OP EST MAY X REQ PHY/QHP: CPT

## 2021-12-27 PROCEDURE — 1036F TOBACCO NON-USER: CPT

## 2021-12-27 PROCEDURE — 3008F BODY MASS INDEX DOCD: CPT

## 2022-01-13 DIAGNOSIS — I63.512 ACUTE ISCHEMIC LEFT MIDDLE CEREBRAL ARTERY (MCA) STROKE (HCC): Primary | ICD-10-CM

## 2022-01-13 DIAGNOSIS — E78.49 OTHER HYPERLIPIDEMIA: ICD-10-CM

## 2022-01-13 RX ORDER — ATORVASTATIN CALCIUM 20 MG/1
20 TABLET, FILM COATED ORAL EVERY EVENING
Qty: 90 TABLET | Refills: 3 | Status: SHIPPED | OUTPATIENT
Start: 2022-01-13

## 2022-01-13 RX ORDER — OMEPRAZOLE 20 MG/1
20 CAPSULE, DELAYED RELEASE ORAL DAILY
Qty: 90 CAPSULE | Refills: 3 | Status: SHIPPED | OUTPATIENT
Start: 2022-01-13

## 2022-02-02 ENCOUNTER — HOSPITAL ENCOUNTER (OUTPATIENT)
Dept: ULTRASOUND IMAGING | Facility: HOSPITAL | Age: 62
Discharge: HOME | End: 2022-02-02
Attending: UROLOGY
Payer: MEDICARE

## 2022-02-02 DIAGNOSIS — N20.0 NEPHROLITHIASIS: ICD-10-CM

## 2022-02-02 PROCEDURE — 76770 US EXAM ABDO BACK WALL COMP: CPT

## 2022-02-08 ENCOUNTER — REMOTE DEVICE CLINIC VISIT (OUTPATIENT)
Dept: CARDIOLOGY CLINIC | Facility: CLINIC | Age: 62
End: 2022-02-08
Payer: MEDICARE

## 2022-02-08 DIAGNOSIS — Z95.818 PRESENCE OF OTHER CARDIAC IMPLANTS AND GRAFTS: Primary | ICD-10-CM

## 2022-02-08 PROCEDURE — 93298 REM INTERROG DEV EVAL SCRMS: CPT | Performed by: INTERNAL MEDICINE

## 2022-02-08 PROCEDURE — G2066 INTER DEVC REMOTE 30D: HCPCS | Performed by: INTERNAL MEDICINE

## 2022-02-08 NOTE — PROGRESS NOTES
MDT-LOOP RECORDER   CARELINK TRANSMISSION: LOOP RECORDER  PRESENTING RHYTHM NSR @ 91 BPM  BATTERY STATUS "OK "  4 TACHY EPISODES W/ EGRAMS SHOWING ST vs SVT @ 171-176 BPM  PT ACTIVE DURIGN EPISODES  NO PATIENT ACTIVATED EPISODES  NORMAL DEVICE FUNCTION   DL

## 2022-02-10 ENCOUNTER — OFFICE VISIT (OUTPATIENT)
Dept: FAMILY MEDICINE CLINIC | Facility: CLINIC | Age: 62
End: 2022-02-10
Payer: MEDICARE

## 2022-02-10 VITALS
HEART RATE: 96 BPM | WEIGHT: 139.4 LBS | TEMPERATURE: 97.3 F | SYSTOLIC BLOOD PRESSURE: 148 MMHG | DIASTOLIC BLOOD PRESSURE: 92 MMHG | OXYGEN SATURATION: 97 % | HEIGHT: 61 IN | BODY MASS INDEX: 26.32 KG/M2

## 2022-02-10 DIAGNOSIS — Z00.00 WELCOME TO MEDICARE PREVENTIVE VISIT: Primary | ICD-10-CM

## 2022-02-10 DIAGNOSIS — R91.8 LUNG NODULES: ICD-10-CM

## 2022-02-10 DIAGNOSIS — I10 ESSENTIAL HYPERTENSION: ICD-10-CM

## 2022-02-10 PROCEDURE — G0402 INITIAL PREVENTIVE EXAM: HCPCS | Performed by: FAMILY MEDICINE

## 2022-02-10 RX ORDER — VALSARTAN 40 MG/1
40 TABLET ORAL DAILY
Qty: 30 TABLET | Refills: 5 | Status: SHIPPED | OUTPATIENT
Start: 2022-02-10 | End: 2022-08-05 | Stop reason: SDUPTHER

## 2022-02-10 NOTE — PROGRESS NOTES
Assessment and Plan: For her blood pressure, will start her on valsartan 40 mg daily  She will call if her pressure does not come down or she has any trouble tolerating it  CT scan of her chest ordered for her history of lung nodules  Will see her back in 3-4 months or p r n  Problem List Items Addressed This Visit        Cardiovascular and Mediastinum    Essential hypertension    Relevant Medications    valsartan (DIOVAN) 40 mg tablet       Other    Lung nodules    Relevant Orders    CT chest wo contrast      Other Visit Diagnoses     Welcome to Medicare preventive visit    -  Primary    BMI 26 0-26 9,adult            BMI Counseling: Body mass index is 26 34 kg/m²  The BMI is above normal  Nutrition recommendations include decreasing portion sizes, encouraging healthy choices of fruits and vegetables, decreasing fast food intake, consuming healthier snacks, limiting drinks that contain sugar, moderation in carbohydrate intake, increasing intake of lean protein, reducing intake of saturated and trans fat and reducing intake of cholesterol  No pharmacotherapy was ordered  Rationale for BMI follow-up plan is due to patient being overweight or obese  Preventive health issues were discussed with patient, and age appropriate screening tests were ordered as noted in patient's After Visit Summary  Personalized health advice and appropriate referrals for health education or preventive services given if needed, as noted in patient's After Visit Summary  History of Present Illness:     Patient presents for Welcome to Medicare visit  Patient Care Team:  Yousuf Mcknight DO as PCP - MD Yousuf Mcdermott DO Maryruth Pitcher, MD     Review of Systems:     Review of Systems   Constitutional: Negative  Respiratory: Negative  Cardiovascular: Negative  Gastrointestinal: Negative  Genitourinary: Negative         Problem List:     Patient Active Problem List   Diagnosis    Generalized anxiety disorder    Other hyperlipidemia    Increased BMI    Seasonal allergic rhinitis due to pollen    Lesion of nose    Age-related osteoporosis without current pathological fracture    Acute ischemic left middle cerebral artery (MCA) stroke (HCC)    Stroke aborted by administration of thrombolytic agent (HCC)    Lung nodules    Microscopic hematuria    Essential hypertension      Past Medical and Surgical History:     Past Medical History:   Diagnosis Date    Bladder stone     Hyperlipidemia     Stroke ([address] )     Left hand    Ureteral calculus, right      Past Surgical History:   Procedure Laterality Date    CARDIAC LOOP RECORDER      CHOLECYSTECTOMY      FL RETROGRADE PYELOGRAM  12/17/2021    GALLBLADDER SURGERY      onset: 2011    NM CYSTO/URETERO W/LITHOTRIPSY &INDWELL STENT INSRT Left 12/17/2021    Procedure: CYSTOSCOPY URETEROSCOPY WITH LITHOTRIPSY HOLMIUM LASER, RETROGRADE PYELOGRAM BASKET STONE EXTRACTION AND INSERTION STENT URETERAL;  Surgeon: Taryn Medina MD;  Location: [facility];  Service: Urology      Family History:     Family History   Problem Relation Age of Onset    Stroke Mother         stroke syndrome    Cancer Maternal Grandmother     Stomach cancer Maternal Grandmother     No Known Problems Sister     No Known Problems Maternal Grandfather     No Known Problems Paternal Grandmother     No Known Problems Paternal Grandfather     No Known Problems Sister     No Known Problems Maternal Aunt     No Known Problems Maternal Aunt     No Known Problems Maternal Aunt     No Known Problems Maternal Aunt     No Known Problems Maternal Aunt     No Known Problems Maternal Aunt     No Known Problems Paternal Aunt     No Known Problems Paternal Aunt     No Known Problems Paternal Aunt       Social History:     Social History     Socioeconomic History    Marital status:       Spouse name: None    Number of children: None    Years of education: None  Highest education level: None   Occupational History    None   Tobacco Use    Smoking status: Never Smoker    Smokeless tobacco: Never Used   Vaping Use    Vaping Use: Never used   Substance and Sexual Activity    Alcohol use: Not Currently     Comment: social    Drug use: Never    Sexual activity: Not Currently     Partners: Male     Birth control/protection: Post-menopausal, None   Other Topics Concern    None   Social History Narrative    No living will     Social Determinants of Health     Financial Resource Strain: Not on file   Food Insecurity: Not on file   Transportation Needs: Not on file   Physical Activity: Not on file   Stress: Not on file   Social Connections: Not on file   Intimate Partner Violence: Not on file   Housing Stability: Not on file      Medications and Allergies:     Current Outpatient Medications   Medication Sig Dispense Refill    ascorbic acid (VITAMIN C) 500 mg tablet Take 500 mg by mouth daily      aspirin (ECOTRIN LOW STRENGTH) 81 mg EC tablet Take 1 tablet (81 mg total) by mouth daily 90 tablet 0    atorvastatin (LIPITOR) 20 mg tablet Take 1 tablet (20 mg total) by mouth every evening 90 tablet 3    CRANBERRY PO Take 168 mg by mouth       Dextromethorphan-guaiFENesin (MUCINEX DM PO) Take by mouth      Lactobacillus (ACIDOPHILUS PO) Take by mouth       Multiple Vitamins-Minerals (CENTRUM PO) Take by mouth      omeprazole (PriLOSEC) 20 mg delayed release capsule Take 1 capsule (20 mg total) by mouth daily 90 capsule 3    VITAMIN E PO Take 184 mg by mouth       valsartan (DIOVAN) 40 mg tablet Take 1 tablet (40 mg total) by mouth daily 30 tablet 5     No current facility-administered medications for this visit       Allergies   Allergen Reactions    Penicillin G Hives     Rash      Immunizations:     Immunization History   Administered Date(s) Administered    COVID-19 MODERNA VACC 0 25 ML IM BOOSTER 11/09/2021    COVID-19 MODERNA VACC 0 5 ML IM 03/24/2021, 04/22/2021    INFLUENZA 11/01/2018    Influenza Quadrivalent Preservative Free 3 years and older IM 10/15/2014, 11/03/2015, 11/14/2016, 11/06/2017    Influenza, recombinant, quadrivalent,injectable, preservative free 09/01/2020, 10/01/2021    Influenza, seasonal, injectable 1960      Health Maintenance:         Topic Date Due    Hepatitis C Screening  Never done    HIV Screening  Never done    Cervical Cancer Screening  09/24/2022    Colorectal Cancer Screening  10/13/2030         Topic Date Due    DTaP,Tdap,and Td Vaccines (1 - Tdap) Never done      Medicare Screening Tests and Risk Assessments:     Damián Fleming is here for her Welcome to Medicare visit  Last Medicare Wellness visit information reviewed, patient interviewed and updates made to the record today  Health Risk Assessment:   Patient rates overall health as very good  Patient feels that their physical health rating is slightly better  Patient is satisfied with their life  Eyesight was rated as same  Hearing was rated as same  Patient feels that their emotional and mental health rating is slightly better  Patients states they are never, rarely angry  Patient states they are sometimes unusually tired/fatigued  Pain experienced in the last 7 days has been none  Patient states that she has experienced no weight loss or gain in last 6 months  Fall Risk Screening: In the past year, patient has experienced: no history of falling in past year      Urinary Incontinence Screening:   Patient has not leaked urine accidently in the last six months  Home Safety:  Patient does not have trouble with stairs inside or outside of their home  Patient has working smoke alarms and has working carbon monoxide detector  Home safety hazards include: none  Nutrition:   Current diet is Regular  Medications:   Patient is currently taking over-the-counter supplements  OTC medications include: see medication list  Patient is able to manage medications  Activities of Daily Living (ADLs)/Instrumental Activities of Daily Living (IADLs):   Walk and transfer into and out of bed and chair?: Yes  Dress and groom yourself?: Yes    Bathe or shower yourself?: Yes    Feed yourself? Yes  Do your laundry/housekeeping?: Yes  Manage your money, pay your bills and track your expenses?: Yes  Make your own meals?: Yes    Do your own shopping?: Yes    Previous Hospitalizations:   Any hospitalizations or ED visits within the last 12 months?: No      Advance Care Planning:   Living will: Yes    Advanced directive: Yes    Advanced directive counseling given: Yes      Cognitive Screening:   Provider or family/friend/caregiver concerned regarding cognition?: No    PREVENTIVE SCREENINGS      Cardiovascular Screening:    General: Screening Not Indicated and History Lipid Disorder      Diabetes Screening:     General: Screening Current      Colorectal Cancer Screening:     General: Screening Current      Breast Cancer Screening:     General: Screening Current      Cervical Cancer Screening:    General: Screening Current      Osteoporosis Screening:    General: Screening Not Indicated and History Osteoporosis      Lung Cancer Screening:     General: Screening Not Indicated    Screening, Brief Intervention, and Referral to Treatment (SBIRT)    Screening  Typical number of drinks in a day: 0  Typical number of drinks in a week: 2  Interpretation: Low risk drinking behavior      Single Item Drug Screening:  How often have you used an illegal drug (including marijuana) or a prescription medication for non-medical reasons in the past year? never    Single Item Drug Screen Score: 0  Interpretation: Negative screen for possible drug use disorder    Review of Current Opioid Use    Opioid Risk Tool (ORT) Interpretation: Complete Opioid Risk Tool (ORT)     Visual Acuity Screening    Right eye Left eye Both eyes   Without correction:      With correction: 20/20 20/25 20/20        Physical Exam: /92   Pulse 96   Temp (!) 97 3 °F (36 3 °C)   Ht 5' 1" (1 549 m)   Wt 63 2 kg (139 lb 6 4 oz)   LMP  (LMP Unknown)   SpO2 97%   BMI 26 34 kg/m²     Physical Exam  Vitals reviewed  Constitutional:       General: She is not in acute distress  Appearance: Normal appearance  She is well-developed  She is not ill-appearing, toxic-appearing or diaphoretic  HENT:      Head: Normocephalic and atraumatic  Eyes:      Conjunctiva/sclera: Conjunctivae normal    Cardiovascular:      Rate and Rhythm: Normal rate and regular rhythm  Heart sounds: Normal heart sounds  No murmur heard  No friction rub  No gallop  Pulmonary:      Effort: Pulmonary effort is normal  No respiratory distress  Breath sounds: Normal breath sounds  No wheezing, rhonchi or rales  Musculoskeletal:      Right lower leg: No edema  Left lower leg: No edema  Neurological:      General: No focal deficit present  Mental Status: She is alert and oriented to person, place, and time  Psychiatric:         Mood and Affect: Mood normal          Behavior: Behavior normal          Thought Content: Thought content normal          Judgment: Judgment normal           Werner Montana DO  BMI Counseling: Body mass index is 26 34 kg/m²  The BMI is above normal  Nutrition recommendations include reducing portion sizes, decreasing overall calorie intake, 3-5 servings of fruits/vegetables daily, reducing fast food intake, consuming healthier snacks, decreasing soda and/or juice intake, moderation in carbohydrate intake, increasing intake of lean protein, reducing intake of saturated fat and trans fat and reducing intake of cholesterol

## 2022-02-10 NOTE — PATIENT INSTRUCTIONS
Medicare Preventive Visit Patient Instructions  Thank you for completing your Welcome to Medicare Visit or Medicare Annual Wellness Visit today  Your next wellness visit will be due in one year (2/11/2023)  The screening/preventive services that you may require over the next 5-10 years are detailed below  Some tests may not apply to you based off risk factors and/or age  Screening tests ordered at today's visit but not completed yet may show as past due  Also, please note that scanned in results may not display below  Preventive Screenings:  Service Recommendations Previous Testing/Comments   Colorectal Cancer Screening  * Colonoscopy    * Fecal Occult Blood Test (FOBT)/Fecal Immunochemical Test (FIT)  * Fecal DNA/Cologuard Test  * Flexible Sigmoidoscopy Age: 54-65 years old   Colonoscopy: every 10 years (may be performed more frequently if at higher risk)  OR  FOBT/FIT: every 1 year  OR  Cologuard: every 3 years  OR  Sigmoidoscopy: every 5 years  Screening may be recommended earlier than age 48 if at higher risk for colorectal cancer  Also, an individualized decision between you and your healthcare provider will decide whether screening between the ages of 74-80 would be appropriate  Colonoscopy: 10/13/2020  FOBT/FIT: Not on file  Cologuard: Not on file  Sigmoidoscopy: Not on file    Screening Current     Breast Cancer Screening Age: 36 years old  Frequency: every 1-2 years  Not required if history of left and right mastectomy Mammogram: 09/21/2021    Screening Current   Cervical Cancer Screening Between the ages of 21-29, pap smear recommended once every 3 years  Between the ages of 33-67, can perform pap smear with HPV co-testing every 5 years     Recommendations may differ for women with a history of total hysterectomy, cervical cancer, or abnormal pap smears in past  Pap Smear: 09/24/2019    Screening Current   Hepatitis C Screening Once for adults born between 1945 and 1965  More frequently in patients at high risk for Hepatitis C Hep C Antibody: Not on file        Diabetes Screening 1-2 times per year if you're at risk for diabetes or have pre-diabetes Fasting glucose: 95 mg/dL   A1C: 5 6 %    Screening Current   Cholesterol Screening Once every 5 years if you don't have a lipid disorder  May order more often based on risk factors  Lipid panel: 09/16/2021    Screening Not Indicated  History Lipid Disorder     Other Preventive Screenings Covered by Medicare:  1  Abdominal Aortic Aneurysm (AAA) Screening: covered once if your at risk  You're considered to be at risk if you have a family history of AAA  2  Lung Cancer Screening: covers low dose CT scan once per year if you meet all of the following conditions: (1) Age 50-69; (2) No signs or symptoms of lung cancer; (3) Current smoker or have quit smoking within the last 15 years; (4) You have a tobacco smoking history of at least 30 pack years (packs per day multiplied by number of years you smoked); (5) You get a written order from a healthcare provider  3  Glaucoma Screening: covered annually if you're considered high risk: (1) You have diabetes OR (2) Family history of glaucoma OR (3)  aged 48 and older OR (3)  American aged 72 and older  3  Osteoporosis Screening: covered every 2 years if you meet one of the following conditions: (1) You're estrogen deficient and at risk for osteoporosis based off medical history and other findings; (2) Have a vertebral abnormality; (3) On glucocorticoid therapy for more than 3 months; (4) Have primary hyperparathyroidism; (5) On osteoporosis medications and need to assess response to drug therapy  · Last bone density test (DXA Scan): 10/09/2018  5  HIV Screening: covered annually if you're between the age of 12-76  Also covered annually if you are younger than 13 and older than 72 with risk factors for HIV infection   For pregnant patients, it is covered up to 3 times per pregnancy  Immunizations:  Immunization Recommendations   Influenza Vaccine Annual influenza vaccination during flu season is recommended for all persons aged >= 6 months who do not have contraindications   Pneumococcal Vaccine (Prevnar and Pneumovax)  * Prevnar = PCV13  * Pneumovax = PPSV23   Adults 25-60 years old: 1-3 doses may be recommended based on certain risk factors  Adults 72 years old: Prevnar (PCV13) vaccine recommended followed by Pneumovax (PPSV23) vaccine  If already received PPSV23 since turning 65, then PCV13 recommended at least one year after PPSV23 dose  Hepatitis B Vaccine 3 dose series if at intermediate or high risk (ex: diabetes, end stage renal disease, liver disease)   Tetanus (Td) Vaccine - COST NOT COVERED BY MEDICARE PART B Following completion of primary series, a booster dose should be given every 10 years to maintain immunity against tetanus  Td may also be given as tetanus wound prophylaxis  Tdap Vaccine - COST NOT COVERED BY MEDICARE PART B Recommended at least once for all adults  For pregnant patients, recommended with each pregnancy  Shingles Vaccine (Shingrix) - COST NOT COVERED BY MEDICARE PART B  2 shot series recommended in those aged 48 and above     Health Maintenance Due:      Topic Date Due    Hepatitis C Screening  Never done    HIV Screening  Never done    Cervical Cancer Screening  09/24/2022    Colorectal Cancer Screening  10/13/2030     Immunizations Due:      Topic Date Due    DTaP,Tdap,and Td Vaccines (1 - Tdap) Never done     Advance Directives   What are advance directives? Advance directives are legal documents that state your wishes and plans for medical care  These plans are made ahead of time in case you lose your ability to make decisions for yourself  Advance directives can apply to any medical decision, such as the treatments you want, and if you want to donate organs  What are the types of advance directives?   There are many types of advance directives, and each state has rules about how to use them  You may choose a combination of any of the following:  · Living will: This is a written record of the treatment you want  You can also choose which treatments you do not want, which to limit, and which to stop at a certain time  This includes surgery, medicine, IV fluid, and tube feedings  · Durable power of  for healthcare [facility]): This is a written record that states who you want to make healthcare choices for you when you are unable to make them for yourself  This person, called a proxy, is usually a family member or a friend  You may choose more than 1 proxy  · Do not resuscitate (DNR) order:  A DNR order is used in case your heart stops beating or you stop breathing  It is a request not to have certain forms of treatment, such as CPR  A DNR order may be included in other types of advance directives  · Medical directive: This covers the care that you want if you are in a coma, near death, or unable to make decisions for yourself  You can list the treatments you want for each condition  Treatment may include pain medicine, surgery, blood transfusions, dialysis, IV or tube feedings, and a ventilator (breathing machine)  · Values history: This document has questions about your views, beliefs, and how you feel and think about life  This information can help others choose the care that you would choose  Why are advance directives important? An advance directive helps you control your care  Although spoken wishes may be used, it is better to have your wishes written down  Spoken wishes can be misunderstood, or not followed  Treatments may be given even if you do not want them  An advance directive may make it easier for your family to make difficult choices about your care     Weight Management   Why it is important to manage your weight:  Being overweight increases your risk of health conditions such as heart disease, high blood pressure, type 2 diabetes, and certain types of cancer  It can also increase your risk for osteoarthritis, sleep apnea, and other respiratory problems  Aim for a slow, steady weight loss  Even a small amount of weight loss can lower your risk of health problems  How to lose weight safely:  A safe and healthy way to lose weight is to eat fewer calories and get regular exercise  You can lose up about 1 pound a week by decreasing the number of calories you eat by 500 calories each day  Healthy meal plan for weight management:  A healthy meal plan includes a variety of foods, contains fewer calories, and helps you stay healthy  A healthy meal plan includes the following:  · Eat whole-grain foods more often  A healthy meal plan should contain fiber  Fiber is the part of grains, fruits, and vegetables that is not broken down by your body  Whole-grain foods are healthy and provide extra fiber in your diet  Some examples of whole-grain foods are whole-wheat breads and pastas, oatmeal, brown rice, and bulgur  · Eat a variety of vegetables every day  Include dark, leafy greens such as spinach, kale, rony greens, and mustard greens  Eat yellow and orange vegetables such as carrots, sweet potatoes, and winter squash  · Eat a variety of fruits every day  Choose fresh or canned fruit (canned in its own juice or light syrup) instead of juice  Fruit juice has very little or no fiber  · Eat low-fat dairy foods  Drink fat-free (skim) milk or 1% milk  Eat fat-free yogurt and low-fat cottage cheese  Try low-fat cheeses such as mozzarella and other reduced-fat cheeses  · Choose meat and other protein foods that are low in fat  Choose beans or other legumes such as split peas or lentils  Choose fish, skinless poultry (chicken or turkey), or lean cuts of red meat (beef or pork)  Before you cook meat or poultry, cut off any visible fat  · Use less fat and oil  Try baking foods instead of frying them   Add less fat, such as margarine, sour cream, regular salad dressing and mayonnaise to foods  Eat fewer high-fat foods  Some examples of high-fat foods include french fries, doughnuts, ice cream, and cakes  · Eat fewer sweets  Limit foods and drinks that are high in sugar  This includes candy, cookies, regular soda, and sweetened drinks  Exercise:  Exercise at least 30 minutes per day on most days of the week  Some examples of exercise include walking, biking, dancing, and swimming  You can also fit in more physical activity by taking the stairs instead of the elevator or parking farther away from stores  Ask your healthcare provider about the best exercise plan for you  Narcotic (Opioid) Safety    Use narcotics safely:  · Take prescribed narcotics exactly as directed  · Do not give narcotics to others or take narcotics that belong to someone else  · Do not mix narcotics without medicines or alcohol  · Do not drive or operate heavy machinery after you take the narcotic  · Monitor for side effects and notify your healthcare provider if you experienced side effects such as nausea, sleepiness, itching, or trouble thinking clearly  Manage constipation:    Constipation is the most common side effect of narcotic medicine  Constipation is when you have hard, dry bowel movements, or you go longer than usual between bowel movements  Tell your healthcare provider about all changes in your bowel movements while you are taking narcotics  He or she may recommend laxative medicine to help you have a bowel movement  He or she may also change the kind of narcotic you are taking, or change when you take it  The following are more ways you can prevent or relieve constipation:    · Drink liquids as directed  You may need to drink extra liquids to help soften and move your bowels  Ask how much liquid to drink each day and which liquids are best for you  · Eat high-fiber foods    This may help decrease constipation by adding bulk to your bowel movements  High-fiber foods include fruits, vegetables, whole-grain breads and cereals, and beans  Your healthcare provider or dietitian can help you create a high-fiber meal plan  Your provider may also recommend a fiber supplement if you cannot get enough fiber from food  · Exercise regularly  Regular physical activity can help stimulate your intestines  Walking is a good exercise to prevent or relieve constipation  Ask which exercises are best for you  · Schedule a time each day to have a bowel movement  This may help train your body to have regular bowel movements  Bend forward while you are on the toilet to help move the bowel movement out  Sit on the toilet for at least 10 minutes, even if you do not have a bowel movement  Store narcotics safely:   · Store narcotics where others cannot easily get them  Keep them in a locked cabinet or secure area  Do not  keep them in a purse or other bag you carry with you  A person may be looking for something else and find the narcotics  · Make sure narcotics are stored out of the reach of children  A child can easily overdose on narcotics  Narcotics may look like candy to a small child  The best way to dispose of narcotics: The laws vary by country and area  In the [place], the best way is to return the narcotics through a take-back program  This program is offered by the [employer]  The following are options for using the program:  · Take the narcotics to a [place] collection site  The site is often a law enforcement center  Call your local law enforcement center for scheduled take-back days in your area  You will be given information on where to go if the collection site is in a different location  · Take the narcotics to an approved pharmacy or hospital   A pharmacy or hospital may be set up as a collection site  You will need to ask if it is a [place] collection site if you were not directed there   A pharmacy or doctor's office may not be able to take back narcotics unless it is a [place] site  · Use a mail-back system  This means you are given containers to put the narcotics into  You will then mail them in the containers  · Use a take-back drop box  This is a place to leave the narcotics at any time  People and animals will not be able to get into the box  Your local law enforcement agency can tell you where to find a drop box in your area  Other ways to manage pain:   · Ask your healthcare provider about non-narcotic medicines to control pain  Nonprescription medicines include NSAIDs (such as ibuprofen) and acetaminophen  Prescription medicines include muscle relaxers, antidepressants, and steroids  · Pain may be managed without any medicines  Some ways to relieve pain include massage, aromatherapy, or meditation  Physical or occupational therapy may also help  For more information:   · Drug Enforcement Administration  [facility] [address]  Phone: 0- 318 - [phone]  Web Address: [facility]/drug_disposal/    · [address]  and Drug Administration  [place]  [place] [address] [facility]  Phone: 4- 285 - 994-8898  Web Address: http://[employer]/     © Copyright [employer] 2018 Information is for End User's use only and may not be sold, redistributed or otherwise used for commercial purposes  All illustrations and images included in CareNotes® are the copyrighted property of [employer]  or [facility] Preventive Visit Patient Instructions  Thank you for completing your Welcome to Medicare Visit or Medicare Annual Wellness Visit today  Your next wellness visit will be due in one year (2/11/2023)  The screening/preventive services that you may require over the next 5-10 years are detailed below  Some tests may not apply to you based off risk factors and/or age  Screening tests ordered at today's visit but not completed yet may show as past due  Also, please note that scanned in results may not display below  Preventive Screenings:  Service Recommendations Previous Testing/Comments   Colorectal Cancer Screening  * Colonoscopy    * Fecal Occult Blood Test (FOBT)/Fecal Immunochemical Test (FIT)  * Fecal DNA/Cologuard Test  * Flexible Sigmoidoscopy Age: 54-65 years old   Colonoscopy: every 10 years (may be performed more frequently if at higher risk)  OR  FOBT/FIT: every 1 year  OR  Cologuard: every 3 years  OR  Sigmoidoscopy: every 5 years  Screening may be recommended earlier than age 48 if at higher risk for colorectal cancer  Also, an individualized decision between you and your healthcare provider will decide whether screening between the ages of 74-80 would be appropriate  Colonoscopy: 10/13/2020  FOBT/FIT: Not on file  Cologuard: Not on file  Sigmoidoscopy: Not on file    Screening Current     Breast Cancer Screening Age: 36 years old  Frequency: every 1-2 years  Not required if history of left and right mastectomy Mammogram: 09/21/2021    Screening Current   Cervical Cancer Screening Between the ages of 21-29, pap smear recommended once every 3 years  Between the ages of 33-67, can perform pap smear with HPV co-testing every 5 years  Recommendations may differ for women with a history of total hysterectomy, cervical cancer, or abnormal pap smears in past  Pap Smear: 09/24/2019    Screening Current   Hepatitis C Screening Once for adults born between 1945 and 1965  More frequently in patients at high risk for Hepatitis C Hep C Antibody: Not on file        Diabetes Screening 1-2 times per year if you're at risk for diabetes or have pre-diabetes Fasting glucose: 95 mg/dL   A1C: 5 6 %    Screening Current   Cholesterol Screening Once every 5 years if you don't have a lipid disorder  May order more often based on risk factors   Lipid panel: 09/16/2021    Screening Not Indicated  History Lipid Disorder     Other Preventive Screenings Covered by Medicare:  6  Abdominal Aortic Aneurysm (AAA) Screening: covered once if your at risk  You're considered to be at risk if you have a family history of AAA  7  Lung Cancer Screening: covers low dose CT scan once per year if you meet all of the following conditions: (1) Age 50-69; (2) No signs or symptoms of lung cancer; (3) Current smoker or have quit smoking within the last 15 years; (4) You have a tobacco smoking history of at least 30 pack years (packs per day multiplied by number of years you smoked); (5) You get a written order from a healthcare provider  8  Glaucoma Screening: covered annually if you're considered high risk: (1) You have diabetes OR (2) Family history of glaucoma OR (3)  aged 48 and older OR (3)  American aged 72 and older  5  Osteoporosis Screening: covered every 2 years if you meet one of the following conditions: (1) You're estrogen deficient and at risk for osteoporosis based off medical history and other findings; (2) Have a vertebral abnormality; (3) On glucocorticoid therapy for more than 3 months; (4) Have primary hyperparathyroidism; (5) On osteoporosis medications and need to assess response to drug therapy  · Last bone density test (DXA Scan): 10/09/2018  10  HIV Screening: covered annually if you're between the age of 12-76  Also covered annually if you are younger than 13 and older than 72 with risk factors for HIV infection  For pregnant patients, it is covered up to 3 times per pregnancy  Immunizations:  Immunization Recommendations   Influenza Vaccine Annual influenza vaccination during flu season is recommended for all persons aged >= 6 months who do not have contraindications   Pneumococcal Vaccine (Prevnar and Pneumovax)  * Prevnar = PCV13  * Pneumovax = PPSV23   Adults 25-60 years old: 1-3 doses may be recommended based on certain risk factors  Adults 72 years old: Prevnar (PCV13) vaccine recommended followed by Pneumovax (PPSV23) vaccine   If already received PPSV23 since turning 65, then PCV13 recommended at least one year after PPSV23 dose  Hepatitis B Vaccine 3 dose series if at intermediate or high risk (ex: diabetes, end stage renal disease, liver disease)   Tetanus (Td) Vaccine - COST NOT COVERED BY MEDICARE PART B Following completion of primary series, a booster dose should be given every 10 years to maintain immunity against tetanus  Td may also be given as tetanus wound prophylaxis  Tdap Vaccine - COST NOT COVERED BY MEDICARE PART B Recommended at least once for all adults  For pregnant patients, recommended with each pregnancy  Shingles Vaccine (Shingrix) - COST NOT COVERED BY MEDICARE PART B  2 shot series recommended in those aged 48 and above     Health Maintenance Due:      Topic Date Due    Hepatitis C Screening  Never done    HIV Screening  Never done    Cervical Cancer Screening  09/24/2022    Colorectal Cancer Screening  10/13/2030     Immunizations Due:      Topic Date Due    DTaP,Tdap,and Td Vaccines (1 - Tdap) Never done     Advance Directives   What are advance directives? Advance directives are legal documents that state your wishes and plans for medical care  These plans are made ahead of time in case you lose your ability to make decisions for yourself  Advance directives can apply to any medical decision, such as the treatments you want, and if you want to donate organs  What are the types of advance directives? There are many types of advance directives, and each state has rules about how to use them  You may choose a combination of any of the following:  · Living will: This is a written record of the treatment you want  You can also choose which treatments you do not want, which to limit, and which to stop at a certain time  This includes surgery, medicine, IV fluid, and tube feedings  · Durable power of  for healthcare [facility]):   This is a written record that states who you want to make healthcare choices for you when you are unable to make them for yourself  This person, called a proxy, is usually a family member or a friend  You may choose more than 1 proxy  · Do not resuscitate (DNR) order:  A DNR order is used in case your heart stops beating or you stop breathing  It is a request not to have certain forms of treatment, such as CPR  A DNR order may be included in other types of advance directives  · Medical directive: This covers the care that you want if you are in a coma, near death, or unable to make decisions for yourself  You can list the treatments you want for each condition  Treatment may include pain medicine, surgery, blood transfusions, dialysis, IV or tube feedings, and a ventilator (breathing machine)  · Values history: This document has questions about your views, beliefs, and how you feel and think about life  This information can help others choose the care that you would choose  Why are advance directives important? An advance directive helps you control your care  Although spoken wishes may be used, it is better to have your wishes written down  Spoken wishes can be misunderstood, or not followed  Treatments may be given even if you do not want them  An advance directive may make it easier for your family to make difficult choices about your care  Weight Management   Why it is important to manage your weight:  Being overweight increases your risk of health conditions such as heart disease, high blood pressure, type 2 diabetes, and certain types of cancer  It can also increase your risk for osteoarthritis, sleep apnea, and other respiratory problems  Aim for a slow, steady weight loss  Even a small amount of weight loss can lower your risk of health problems  How to lose weight safely:  A safe and healthy way to lose weight is to eat fewer calories and get regular exercise   You can lose up about 1 pound a week by decreasing the number of calories you eat by 500 calories each day    Healthy meal plan for weight management:  A healthy meal plan includes a variety of foods, contains fewer calories, and helps you stay healthy  A healthy meal plan includes the following:  · Eat whole-grain foods more often  A healthy meal plan should contain fiber  Fiber is the part of grains, fruits, and vegetables that is not broken down by your body  Whole-grain foods are healthy and provide extra fiber in your diet  Some examples of whole-grain foods are whole-wheat breads and pastas, oatmeal, brown rice, and bulgur  · Eat a variety of vegetables every day  Include dark, leafy greens such as spinach, kale, rony greens, and mustard greens  Eat yellow and orange vegetables such as carrots, sweet potatoes, and winter squash  · Eat a variety of fruits every day  Choose fresh or canned fruit (canned in its own juice or light syrup) instead of juice  Fruit juice has very little or no fiber  · Eat low-fat dairy foods  Drink fat-free (skim) milk or 1% milk  Eat fat-free yogurt and low-fat cottage cheese  Try low-fat cheeses such as mozzarella and other reduced-fat cheeses  · Choose meat and other protein foods that are low in fat  Choose beans or other legumes such as split peas or lentils  Choose fish, skinless poultry (chicken or turkey), or lean cuts of red meat (beef or pork)  Before you cook meat or poultry, cut off any visible fat  · Use less fat and oil  Try baking foods instead of frying them  Add less fat, such as margarine, sour cream, regular salad dressing and mayonnaise to foods  Eat fewer high-fat foods  Some examples of high-fat foods include french fries, doughnuts, ice cream, and cakes  · Eat fewer sweets  Limit foods and drinks that are high in sugar  This includes candy, cookies, regular soda, and sweetened drinks  Exercise:  Exercise at least 30 minutes per day on most days of the week  Some examples of exercise include walking, biking, dancing, and swimming   You can also fit in more physical activity by taking the stairs instead of the elevator or parking farther away from stores  Ask your healthcare provider about the best exercise plan for you  Narcotic (Opioid) Safety    Use narcotics safely:  · Take prescribed narcotics exactly as directed  · Do not give narcotics to others or take narcotics that belong to someone else  · Do not mix narcotics without medicines or alcohol  · Do not drive or operate heavy machinery after you take the narcotic  · Monitor for side effects and notify your healthcare provider if you experienced side effects such as nausea, sleepiness, itching, or trouble thinking clearly  Manage constipation:    Constipation is the most common side effect of narcotic medicine  Constipation is when you have hard, dry bowel movements, or you go longer than usual between bowel movements  Tell your healthcare provider about all changes in your bowel movements while you are taking narcotics  He or she may recommend laxative medicine to help you have a bowel movement  He or she may also change the kind of narcotic you are taking, or change when you take it  The following are more ways you can prevent or relieve constipation:    · Drink liquids as directed  You may need to drink extra liquids to help soften and move your bowels  Ask how much liquid to drink each day and which liquids are best for you  · Eat high-fiber foods  This may help decrease constipation by adding bulk to your bowel movements  High-fiber foods include fruits, vegetables, whole-grain breads and cereals, and beans  Your healthcare provider or dietitian can help you create a high-fiber meal plan  Your provider may also recommend a fiber supplement if you cannot get enough fiber from food  · Exercise regularly  Regular physical activity can help stimulate your intestines  Walking is a good exercise to prevent or relieve constipation  Ask which exercises are best for you    · Schedule a time each day to have a bowel movement  This may help train your body to have regular bowel movements  Bend forward while you are on the toilet to help move the bowel movement out  Sit on the toilet for at least 10 minutes, even if you do not have a bowel movement  Store narcotics safely:   · Store narcotics where others cannot easily get them  Keep them in a locked cabinet or secure area  Do not  keep them in a purse or other bag you carry with you  A person may be looking for something else and find the narcotics  · Make sure narcotics are stored out of the reach of children  A child can easily overdose on narcotics  Narcotics may look like candy to a small child  The best way to dispose of narcotics: The laws vary by country and area  In the [place], the best way is to return the narcotics through a take-back program  This program is offered by the [employer])  The following are options for using the program:  · Take the narcotics to a [facility] collection site  The site is often a law enforcement center  Call your local law enforcement center for scheduled take-back days in your area  You will be given information on where to go if the collection site is in a different location  · Take the narcotics to an approved pharmacy or hospital   A pharmacy or hospital may be set up as a collection site  You will need to ask if it is a [place] collection site if you were not directed there  A pharmacy or doctor's office may not be able to take back narcotics unless it is a [place] site  · Use a mail-back system  This means you are given containers to put the narcotics into  You will then mail them in the containers  · Use a take-back drop box  This is a place to leave the narcotics at any time  People and animals will not be able to get into the box  Your local law enforcement agency can tell you where to find a drop box in your area      Other ways to manage pain:   · Ask your healthcare provider about non-narcotic medicines to control pain  Nonprescription medicines include NSAIDs (such as ibuprofen) and acetaminophen  Prescription medicines include muscle relaxers, antidepressants, and steroids  · Pain may be managed without any medicines  Some ways to relieve pain include massage, aromatherapy, or meditation  Physical or occupational therapy may also help  For more information:   · Drug Enforcement Administration  [address] [facility] [address]  Phone: 1- 363 - [phone]  Web Address: [facility]/drug_disposal/    · [address] and Drug Administration  [place] , [address]  Phone: 9- 790 - [phone]  Web Address: http://[employer]/     © Copyright [employer] 2018 Information is for End User's use only and may not be sold, redistributed or otherwise used for commercial purposes  All illustrations and images included in CareNotes® are the copyrighted property of [employer]  or [employer]   AMBULATORY CARE:   A heart healthy diet  is an eating plan low in unhealthy fats and sodium (salt)  The plan is high in healthy fats and fiber  A heart healthy diet helps improve your cholesterol levels and lowers your risk for heart disease and stroke  A dietitian will teach you how to read and understand food labels  Heart healthy diet guidelines to follow:   · Choose foods that contain healthy fats  ? Unsaturated fats  include monounsaturated and polyunsaturated fats  Unsaturated fat is found in foods such as soybean, canola, olive, corn, and safflower oils  It is also found in soft tub margarine that is made with liquid vegetable oil  ? Omega-3 fat  is found in certain fish, such as salmon, tuna, and trout, and in walnuts and flaxseed  Eat fish high in omega-3 fats at least 2 times a week  · Get 20 to 30 grams of fiber each day    Fruits, vegetables, whole-grain foods, and legumes (cooked beans) are good sources of fiber  · Limit or do not have unhealthy fats  ? Cholesterol  is found in animal foods, such as eggs and lobster, and in dairy products made from whole milk  Limit cholesterol to less than 200 mg each day  ? Saturated fat  is found in meats, such as hernandez and hamburger  It is also found in chicken or turkey skin, whole milk, and butter  Limit saturated fat to less than 7% of your total daily calories  ? Trans fat  is found in packaged foods, such as potato chips and cookies  It is also in hard margarine, some fried foods, and shortening  Do not eat foods that contain trans fats  · Limit sodium as directed  You may be told to limit sodium to 2,000 to 2,300 mg each day  Choose low-sodium or no-salt-added foods  Add little or no salt to food you prepare  Use herbs and spices in place of salt  Include the following in your heart healthy plan:  Ask your dietitian or healthcare provider how many servings to have from each of the following food groups:  · Grains:      ? Whole-wheat breads, cereals, and pastas, and brown rice    ? Low-fat, low-sodium crackers and chips    · Vegetables:      ? Broccoli, green beans, green peas, and spinach    ? Collards, kale, and lima beans    ? Carrots, sweet potatoes, tomatoes, and peppers    ? Canned vegetables with no salt added    · Fruits:      ? Bananas, peaches, pears, and pineapple    ? Grapes, raisins, and dates    ? Oranges, tangerines, grapefruit, orange juice, and grapefruit juice    ? Apricots, mangoes, melons, and papaya    ? Raspberries and strawberries    ? Canned fruit with no added sugar    · Low-fat dairy:      ? Nonfat (skim) milk, 1% milk, and low-fat almond, cashew, or soy milks fortified with calcium    ? Low-fat cheese, regular or frozen yogurt, and cottage cheese    · Meats and proteins:      ? Lean cuts of beef and pork (loin, leg, round), skinless chicken and turkey    ?  Legumes, soy products, egg whites, or nuts    Limit or do not include the following in your heart healthy plan:   · Unhealthy fats and oils:      ? Whole or 2% milk, cream cheese, sour cream, or cheese    ? High-fat cuts of beef (T-bone steaks, ribs), chicken or turkey with skin, and organ meats such as liver    ? Butter, stick margarine, shortening, and cooking oils such as coconut or palm oil    · Foods and liquids high in sodium:      ? Packaged foods, such as frozen dinners, cookies, macaroni and cheese, and cereals with more than 300 mg of sodium per serving    ? Vegetables with added sodium, such as instant potatoes, vegetables with added sauces, or regular canned vegetables    ? Cured or smoked meats, such as hot dogs, hernandez, and sausage    ? High-sodium ketchup, barbecue sauce, salad dressing, pickles, olives, soy sauce, or miso    · Foods and liquids high in sugar:      ? Candy, cake, cookies, pies, or doughnuts    ? Soft drinks (soda), sports drinks, or sweetened tea    ? Canned or dry mixes for cakes, soups, sauces, or gravies    Other healthy heart guidelines:   · Do not smoke  Nicotine and other chemicals in cigarettes and cigars can cause lung and heart damage  Ask your healthcare provider for information if you currently smoke and need help to quit  E-cigarettes or smokeless tobacco still contain nicotine  Talk to your healthcare provider before you use these products  · Limit or do not drink alcohol as directed  Alcohol can damage your heart and raise your blood pressure  Your healthcare provider may give you specific daily and weekly limits  The general recommended limit is 1 drink a day for women 21 or older and for men 72 or older  Do not have more than 3 drinks in a day or 7 in a week  The recommended limit is 2 drinks a day for men 24to 59years of age  Do not have more than 4 drinks in a day or 14 in a week  A drink of alcohol is 12 ounces of beer, 5 ounces of wine, or 1½ ounces of liquor  · Exercise regularly    Exercise can help you maintain a healthy weight and improve your blood pressure and cholesterol levels  Regular exercise can also decrease your risk for heart problems  Ask your healthcare provider about the best exercise plan for you  Do not start an exercise program without asking your healthcare provider  Follow up with your doctor or cardiologist as directed:  Write down your questions so you remember to ask them during your visits  © Copyright [employer] 2021 Information is for End User's use only and may not be sold, redistributed or otherwise used for commercial purposes  All illustrations and images included in CareNotes® are the copyrighted property of [employer]  or [facility] Rosario Bird   The above information is an  only  It is not intended as medical advice for individual conditions or treatments  Talk to your doctor, nurse or pharmacist before following any medical regimen to see if it is safe and effective for you

## 2022-02-11 ENCOUNTER — TELEPHONE (OUTPATIENT)
Dept: FAMILY MEDICINE CLINIC | Facility: CLINIC | Age: 62
End: 2022-02-11

## 2022-02-11 NOTE — TELEPHONE ENCOUNTER
You just started her on Valsartan, she took it last night with her atorvastatin  Is that ok to take together?

## 2022-02-14 ENCOUNTER — TELEPHONE (OUTPATIENT)
Dept: FAMILY MEDICINE CLINIC | Facility: CLINIC | Age: 62
End: 2022-02-14

## 2022-02-14 NOTE — TELEPHONE ENCOUNTER
Patient just started the valsartan on 02/10  The loop recorder message was on 02/08/2022  Continue the valsartan    She should follow up with Cardiology

## 2022-02-14 NOTE — TELEPHONE ENCOUNTER
[facility] called regarding the message loop recorder  She take it every three months and was told to do it every week  They wanted to know is she was on a new med  You just started her on Valsartan, they are concerned about it  Should she stop it? Is there another one you want to order?

## 2022-02-15 ENCOUNTER — REMOTE DEVICE CLINIC VISIT (OUTPATIENT)
Dept: CARDIOLOGY CLINIC | Facility: CLINIC | Age: 62
End: 2022-02-15
Payer: MEDICARE

## 2022-02-15 DIAGNOSIS — Z95.818 PRESENCE OF OTHER CARDIAC IMPLANTS AND GRAFTS: Primary | ICD-10-CM

## 2022-02-15 PROCEDURE — 93298 REM INTERROG DEV EVAL SCRMS: CPT | Performed by: INTERNAL MEDICINE

## 2022-02-15 PROCEDURE — G2066 INTER DEVC REMOTE 30D: HCPCS | Performed by: INTERNAL MEDICINE

## 2022-02-15 NOTE — PROGRESS NOTES
MDT-LOOP RECORDER   CARELINK TRANSMISSION: LOOP RECORDER  PRESENTING RHYTHM ST @ 103 BPM  BATTERY STATUS "OK " NO PATIENT OR DEVICE ACTIVATED EPISODES  NORMAL DEVICE FUNCTION   DL

## 2022-02-17 ENCOUNTER — HOSPITAL ENCOUNTER (OUTPATIENT)
Dept: CT IMAGING | Facility: HOSPITAL | Age: 62
Discharge: HOME | End: 2022-02-17
Payer: MEDICARE

## 2022-02-17 DIAGNOSIS — R91.8 LUNG NODULES: ICD-10-CM

## 2022-02-17 PROCEDURE — 71250 CT THORAX DX C-: CPT

## 2022-02-17 PROCEDURE — G1004 CDSM NDSC: HCPCS

## 2022-02-18 ENCOUNTER — OFFICE VISIT (OUTPATIENT)
Dept: UROLOGY | Facility: CLINIC | Age: 62
End: 2022-02-18
Payer: MEDICARE

## 2022-02-18 VITALS
WEIGHT: 138 LBS | SYSTOLIC BLOOD PRESSURE: 142 MMHG | HEART RATE: 72 BPM | BODY MASS INDEX: 26.07 KG/M2 | DIASTOLIC BLOOD PRESSURE: 90 MMHG

## 2022-02-18 DIAGNOSIS — R31.29 MICROSCOPIC HEMATURIA: ICD-10-CM

## 2022-02-18 DIAGNOSIS — N20.0 NEPHROLITHIASIS: Primary | ICD-10-CM

## 2022-02-18 LAB
SL AMB  POCT GLUCOSE, UA: NORMAL
SL AMB LEUKOCYTE ESTERASE,UA: NORMAL
SL AMB POCT BILIRUBIN,UA: NORMAL
SL AMB POCT BLOOD,UA: NORMAL
SL AMB POCT CLARITY,UA: CLEAR
SL AMB POCT COLOR,UA: YELLOW
SL AMB POCT KETONES,UA: NORMAL
SL AMB POCT NITRITE,UA: NORMAL
SL AMB POCT PH,UA: 5
SL AMB POCT SPECIFIC GRAVITY,UA: 1.02
SL AMB POCT URINE PROTEIN: NORMAL
SL AMB POCT UROBILINOGEN: 0.2

## 2022-02-18 PROCEDURE — 81002 URINALYSIS NONAUTO W/O SCOPE: CPT | Performed by: NURSE PRACTITIONER

## 2022-02-18 PROCEDURE — 99213 OFFICE O/P EST LOW 20 MIN: CPT | Performed by: NURSE PRACTITIONER

## 2022-02-18 NOTE — ASSESSMENT & PLAN NOTE
· Urine dip unremarkable  · Follow-up 1 year if no further episodes of microscopic hematuria can follow-up as needed

## 2022-02-18 NOTE — PATIENT INSTRUCTIONS
Dietary Management of Kidney Stone Disease    The dietary recommendations for most people who make kidney stones (especially the most common calcium oxalate stones) are uncomplicated and are not too tedious or bland  Most importantly, the following recommendations also promote better health for a variety of reasons  FLUIDS:  The single most important change for the majority patients is the need to greatly increase fluid intake  You should at least produce two liters (about two quarts) of urine each day  Depending on the heat outdoors and your level of physical activity, this usually means consuming ten, 10 ounce glasses (100 ounces) of fluid per day  Water is always a good choice, but other drinks including tea, coffee, soda, and juice are also allowed as long as no one beverage becomes the sole source of fluid  CALCIUM:  There is excellent evidence that calcium should not be avoided, but instead moderated  A range of 600 to 1,100 mg of calcium per day, especially consumed at meals is probably a reasonable target  (i e  2-3 dairy servings per day) This might include small servings of yogurt, milk or ice cream   This amount helps avoid over-absorption of oxalate from the digestive tract and also allows for healthy bone maintenance  SODIUM (SALT): Too much salt in your diet (both from the shaker and in the prepared foods that we buy) is bad for your blood pressure, bad for your heart, and also increases the amount of calcium in your urine  A reasonable sodium restriction to 2,000-2,500mg/day (about the amount in one teaspoon) is an excellent target  You should get into the habit of reading the Nutrients labels on all the foods that you eat and watch out for the foods that have a high sodium content (snack foods, smoked or processed foods, caned foods)  Fresh and frozen foods usually have the least amount of sodium      PROTEIN:  High protein diets from animal meat (beef, chicken, pork, fish) also increases the rate of kidney stone formation and is equally unhealthy for your heart  All patients should moderate their meat intake to 3-7 ounces per day, and particularly stay away from red meat protein  OXALATE:  Most stone-formers should avoid heavy intake of oxalate-rich foods  These include green roughage (spinach, mustard, kale), strawberries, chocolate, tea, iced tea, and nuts  In addition, heavy, excess doses of Vitamin C can also produce surges in urinary oxalate levels and should be avoided  ** THESE FOODS ARE HIGH IN OXALATE - TRY TO LIMIT HOW MUCH OF THESE YOU EAT:  Coke and Pepsi  Nuts  Dark Leafy Greens:     Spinach and Kale, Rhubarb, Chard  Asparagus  Beets  Sweet potatoes   Blueberries, Strawberries   Dark tea (Green tea is okay)  Tofu      DRINK 3 QUARTS (96 Oz ) LIQUIDS EACH DAY - ALL LIQUIDS COUNT        ADD LEMON JUICE 3 OZ  DAILY - Fresh squeezed or lemon juice concentrate - Not [employer], [place] ([facility]) Hill, Crystal Lite, etc         ** Recipe for NASIM'S OLDNAE TYME LEMONADE:         One ounce of lemon juice, glass of water, sweetener of your choice    Coffee is okay! Cranberry juice is good to prevent infections, but does not help for stones  BARE-BONES RECOMMENDATIONS:  1  Fluids, fluids, fluids  2  Low salt diet (your primary care doctor will love you)  3  Moderate red meat intake  4  Moderate calcium (dairy products), especially with meals

## 2022-02-18 NOTE — PROGRESS NOTES
Assessment and plan:     Microscopic hematuria  · Urine dip unremarkable  · Follow-up 1 year if no further episodes of microscopic hematuria can follow-up as needed    Nephrolithiasis  · S/P URS 2021  · Ultrasound kidney bladder 1 year  · Reviewed AUA dietary recommendations and hydration goals  · Follow-up 1 year if remains stone free can follow-up p r n  LOERTTA Nathan    History of Present Illness     Carlie Cano is a 59 y o  female who established care with me 2021 for microscopic hematuria  Prior to establishing care she had urine cytology that revealed atypical urothelial cells  She denied gross hematuria or smoking history  But 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 [address]  She also had a prior history of kidney stones with spontaneous passage  She has CTU scan that revealed no masses, bilateral simple cyst and she was also found to have an 8 x 4 mm left renal pelvic calculus as well as a nonobstructive 2 mm lower pole calculus  No bladder mass  She was also be scheduled for URS 2021 and presents today for follow-up  Her urine dip is unremarkable her ultrasound was also unremarkable  Being followed for a new lung nodule  She reports her   a year ago      Laboratory     Lab Results   Component Value Date    BUN 27 (H) 2021    CREATININE 0 75 2021       No components found for: GFR    Lab Results   Component Value Date    GLUCOSE 102 2015    CALCIUM 9 7 2021     2015    K 3 9 2021    CO2 24 2021     (H) 2021       Lab Results   Component Value Date    WBC 5 82 2021    HGB 15 0 2021    HCT 46 1 2021    MCV 94 2021     2021       No results found for: PSA    Recent Results (from the past 1 hour(s))   POCT urine dip    Collection Time: 22 11:45 AM   Result Value Ref Range    LEUKOCYTE ESTERASE,UA -     NITRITE,UA -     SL AMB POCT UROBILINOGEN 0 2     POCT URINE PROTEIN -      PH,UA 5 0     BLOOD,UA -     SPECIFIC GRAVITY,UA 1 02     KETONES,UA -     BILIRUBIN,UA -     GLUCOSE, UA -      COLOR,UA yellow     CLARITY,UA clear        @RESULT(URINEMICROSCOPIC)@    @RESULT(URINECULTURE)@    Radiology     RENAL ULTRASOUND 2/2/2022     INDICATION:   N20 0: Calculus of kidney      COMPARISON:  CT renal protocol 11/11/2021     TECHNIQUE:   Ultrasound of the retroperitoneum was performed with a curvilinear transducer utilizing volumetric sweeps and still imaging techniques       FINDINGS:     KIDNEYS:  Symmetric and normal size  Right kidney:  9 1 x 5 1 x 4 5 cm  Left kidney:  9 4 x 5 4 x 5 cm      Right kidney  Normal echogenicity and contour  No suspicious masses detected  No hydronephrosis  No shadowing calculi  No perinephric fluid collections      Left kidney  Normal echogenicity and contour  No suspicious masses detected  No hydronephrosis  No shadowing calculi  No perinephric fluid collections      URETERS:  Nonvisualized      BLADDER:   Normally distended  No focal thickening or mass lesions  Bilateral ureteral jets detected         IMPRESSION:     Normal   Review of Systems     Review of Systems   Constitutional: Negative for activity change, appetite change, chills, fatigue, fever and unexpected weight change  HENT: Negative for facial swelling  Eyes: Negative for discharge  Respiratory: Negative  Negative for cough and shortness of breath  Cardiovascular: Negative for chest pain and leg swelling  Gastrointestinal: Negative  Negative for abdominal distention, abdominal pain, constipation, diarrhea, nausea and vomiting  Endocrine: Negative  Genitourinary: Negative  Negative for decreased urine volume, difficulty urinating, dysuria, enuresis, flank pain, frequency, genital sores, hematuria and urgency  Musculoskeletal: Negative for back pain and myalgias  Skin: Negative for pallor and rash  Allergic/Immunologic: Negative  Negative for immunocompromised state  Neurological: Negative for facial asymmetry and speech difficulty  Psychiatric/Behavioral: Negative for agitation and confusion  Allergies     Allergies   Allergen Reactions    Penicillin G Hives     Rash       Physical Exam     Physical Exam  Vitals reviewed  Constitutional:       General: She is not in acute distress  Appearance: Normal appearance  She is normal weight  She is not ill-appearing, toxic-appearing or diaphoretic  HENT:      Head: Normocephalic and atraumatic  Eyes:      General: No scleral icterus  Cardiovascular:      Rate and Rhythm: Normal rate  Pulmonary:      Effort: Pulmonary effort is normal  No respiratory distress  Abdominal:      General: Abdomen is flat  There is no distension  Palpations: Abdomen is soft  Tenderness: There is no abdominal tenderness  There is no right CVA tenderness, left CVA tenderness, guarding or rebound  Musculoskeletal:         General: No swelling  Cervical back: Normal range of motion  Skin:     General: Skin is warm and dry  Coloration: Skin is not jaundiced or pale  Findings: No rash  Neurological:      General: No focal deficit present  Mental Status: She is alert and oriented to person, place, and time  Gait: Gait normal    Psychiatric:         Mood and Affect: Mood normal          Behavior: Behavior normal          Thought Content:  Thought content normal          Judgment: Judgment normal          Vital Signs     Vitals:    02/18/22 1056   BP: 142/90   Pulse: 72   Weight: 62 6 kg (138 lb)       Current Medications       Current Outpatient Medications:     ascorbic acid (VITAMIN C) 500 mg tablet, Take 500 mg by mouth daily, Disp: , Rfl:     aspirin (ECOTRIN LOW STRENGTH) 81 mg EC tablet, Take 1 tablet (81 mg total) by mouth daily, Disp: 90 tablet, Rfl: 0    atorvastatin (LIPITOR) 20 mg tablet, Take 1 tablet (20 mg total) by mouth every evening, Disp: 90 tablet, Rfl: 3    CRANBERRY PO, Take 168 mg by mouth , Disp: , Rfl:     Dextromethorphan-guaiFENesin (MUCINEX DM PO), Take by mouth, Disp: , Rfl:     Lactobacillus (ACIDOPHILUS PO), Take by mouth , Disp: , Rfl:     Multiple Vitamins-Minerals (CENTRUM PO), Take by mouth, Disp: , Rfl:     omeprazole (PriLOSEC) 20 mg delayed release capsule, Take 1 capsule (20 mg total) by mouth daily, Disp: 90 capsule, Rfl: 3    valsartan (DIOVAN) 40 mg tablet, Take 1 tablet (40 mg total) by mouth daily, Disp: 30 tablet, Rfl: 5    VITAMIN E PO, Take 184 mg by mouth , Disp: , Rfl:     Active Problems     Patient Active Problem List   Diagnosis    Generalized anxiety disorder    Other hyperlipidemia    Increased BMI    Seasonal allergic rhinitis due to pollen    Lesion of nose    Age-related osteoporosis without current pathological fracture    Acute ischemic left middle cerebral artery (MCA) stroke ([address] )    Stroke aborted by administration of thrombolytic agent ([address] )    Lung nodules    Microscopic hematuria    Essential hypertension    Nephrolithiasis       Past Medical History     Past Medical History:   Diagnosis Date    Bladder stone     Hyperlipidemia     Stroke ([address] )     Left hand    Ureteral calculus, right        Surgical History     Past Surgical History:   Procedure Laterality Date    CARDIAC LOOP RECORDER      CHOLECYSTECTOMY      [place] RETROGRADE PYELOGRAM  12/17/2021    GALLBLADDER SURGERY      onset: 2011    [place] CYSTO/URETERO W/LITHOTRIPSY &INDWELL STENT INSRT Left 12/17/2021    Procedure: CYSTOSCOPY URETEROSCOPY WITH LITHOTRIPSY HOLMIUM LASER, RETROGRADE PYELOGRAM BASKET STONE EXTRACTION AND INSERTION STENT URETERAL;  Surgeon: Khadra Ayoub MD;  [facility];  Service: Urology       Family History     Family History   Problem Relation Age of Onset    Stroke Mother         stroke syndrome    Cancer Maternal Grandmother     Stomach cancer Maternal Grandmother     No Known Problems Sister     No Known Problems Maternal Grandfather     No Known Problems Paternal Grandmother     No Known Problems Paternal Grandfather     No Known Problems Sister     No Known Problems Maternal Aunt     No Known Problems Maternal Aunt     No Known Problems Maternal Aunt     No Known Problems Maternal Aunt     No Known Problems Maternal Aunt     No Known Problems Maternal Aunt     No Known Problems Paternal Aunt     No Known Problems Paternal Aunt     No Known Problems Paternal Aunt        Social History     Social History     Social History     Tobacco Use   Smoking Status Never Smoker   Smokeless Tobacco Never Used       Past Surgical History:   Procedure Laterality Date    CARDIAC LOOP RECORDER      CHOLECYSTECTOMY      [facility] RETROGRADE PYELOGRAM  12/17/2021    GALLBLADDER SURGERY      onset: 2011    [place] CYSTO/URETERO W/LITHOTRIPSY &INDWELL STENT INSRT Left 12/17/2021    Procedure: CYSTOSCOPY URETEROSCOPY WITH LITHOTRIPSY HOLMIUM LASER, RETROGRADE PYELOGRAM BASKET STONE EXTRACTION AND INSERTION STENT URETERAL;  Surgeon: Marjorie Chavira MD;  [facility];  Service: Urology         The following portions of the patient's history were reviewed and updated as appropriate: allergies, current medications, past family history, past medical history, past social history, past surgical history and problem list    Please note :  Voice dictation software has been used to create this document  There may be inadvertent transcription errors      [id number] [id number] [place]

## 2022-02-18 NOTE — ASSESSMENT & PLAN NOTE
· S/P URS 12/17/2021  · Ultrasound kidney bladder 1 year  · Reviewed AUA dietary recommendations and hydration goals  · Follow-up 1 year if remains stone free can follow-up p r n

## 2022-02-22 ENCOUNTER — REMOTE DEVICE CLINIC VISIT (OUTPATIENT)
Dept: CARDIOLOGY CLINIC | Facility: CLINIC | Age: 62
End: 2022-02-22

## 2022-02-22 DIAGNOSIS — Z95.818 PRESENCE OF OTHER CARDIAC IMPLANTS AND GRAFTS: Primary | ICD-10-CM

## 2022-02-22 PROCEDURE — RECHECK: Performed by: INTERNAL MEDICINE

## 2022-02-22 NOTE — PROGRESS NOTES
MDT-LOOP RECORDER   CAREL[employer] TRANSMISSION: LOOP RECORDER  PRESENTING RHYTHM NSR @ 87 BPM  BATTERY STATUS "OK " NO PATIENT OR DEVICE ACTIVATED EPISODES  NORMAL DEVICE FUNCTION   DL

## 2022-02-28 ENCOUNTER — TELEPHONE (OUTPATIENT)
Dept: FAMILY MEDICINE CLINIC | Facility: CLINIC | Age: 62
End: 2022-02-28

## 2022-02-28 DIAGNOSIS — I10 ESSENTIAL HYPERTENSION: ICD-10-CM

## 2022-02-28 NOTE — TELEPHONE ENCOUNTER
Patient doing well on the valsartan  She should continue  She should have refills on the prescription

## 2022-03-22 ENCOUNTER — REMOTE DEVICE CLINIC VISIT (OUTPATIENT)
Dept: CARDIOLOGY CLINIC | Facility: CLINIC | Age: 62
End: 2022-03-22
Payer: MEDICARE

## 2022-03-22 DIAGNOSIS — Z95.818 PRESENCE OF OTHER CARDIAC IMPLANTS AND GRAFTS: Primary | ICD-10-CM

## 2022-03-22 PROCEDURE — 93298 REM INTERROG DEV EVAL SCRMS: CPT | Performed by: INTERNAL MEDICINE

## 2022-03-22 PROCEDURE — G2066 INTER DEVC REMOTE 30D: HCPCS | Performed by: INTERNAL MEDICINE

## 2022-03-22 NOTE — PROGRESS NOTES
MDT-LOOP RECORDER   CARELINK TRANSMISSION: LOOP RECORDER  PRESENTING RHYTHM NSR @ 93 BPM  BATTERY STATUS "OK " NO PATIENT OR DEVICE ACTIVATED EPISODES  NORMAL DEVICE FUNCTION   DL

## 2022-03-24 ENCOUNTER — OFFICE VISIT (OUTPATIENT)
Dept: CARDIOLOGY CLINIC | Facility: CLINIC | Age: 62
End: 2022-03-24
Payer: MEDICARE

## 2022-03-24 VITALS
HEIGHT: 61 IN | DIASTOLIC BLOOD PRESSURE: 80 MMHG | HEART RATE: 79 BPM | BODY MASS INDEX: 25.45 KG/M2 | OXYGEN SATURATION: 98 % | WEIGHT: 134.8 LBS | SYSTOLIC BLOOD PRESSURE: 128 MMHG

## 2022-03-24 DIAGNOSIS — E78.5 DYSLIPIDEMIA: ICD-10-CM

## 2022-03-24 DIAGNOSIS — Z95.818 STATUS POST PLACEMENT OF IMPLANTABLE LOOP RECORDER: Primary | ICD-10-CM

## 2022-03-24 DIAGNOSIS — I10 BENIGN ESSENTIAL HTN: ICD-10-CM

## 2022-03-24 PROCEDURE — 99214 OFFICE O/P EST MOD 30 MIN: CPT | Performed by: INTERNAL MEDICINE

## 2022-03-24 NOTE — PROGRESS NOTES
Cardiology             Mau Duarte  1960  [id number]            Assessment/plan:     1  CVA 07/11/2019 status post tPA administration, status post loop recorder implantation 08/2019  2  Dyslipidemia  3  Hypertension    · No evidence of atrial fibrillation or flutter on loop recorder interrogations, continue the same   · Continue atorvastatin for dyslipidemia   · Blood pressure controlled, continue valsartan      Follow-up in 1 year      Interval history:    Aden Quiroga is a 59 y o  female hospitalized 07/2019 with acute right-sided weakness   She was administered tPA as per stroke protocol   MRI brain revealed small residual area of ischemia within the MCA territory not salvaged by tPA   Echocardiogram was unremarkable   She underwent loop recorder implantation 08/06/2019       During her prior visit 01/2020, she was offered a JOHN PAUL that this was never done as part of her workup for cryptogenic stroke, and rationale for the same was explained to her  She politely declined stating that she would  Let me know if she decides to do this        She presents today for follow-up with no complaints  She feels well without exertional chest pain, shortness of breath, dizziness, palpitations, lower extremity edema          Vitals:  Vitals:    03/24/22 1359   BP: 128/80   Pulse: 79   SpO2: 98%   Weight: 61 1 kg (134 lb 12 8 oz)   Height: 5' 1" (1 549 m)         Past Medical History:   Diagnosis Date    Bladder stone     Hyperlipidemia     Stroke [facility])     Left hand    Ureteral calculus, right      Social History     Socioeconomic History    Marital status:       Spouse name: Not on file    Number of children: Not on file    Years of education: Not on file    Highest education level: Not on file   Occupational History    Not on file   Tobacco Use    Smoking status: Never Smoker    Smokeless tobacco: Never Used   Vaping Use    Vaping Use: Never used   Substance and Sexual Activity    Alcohol use: Yes     Comment: social    Drug use: Never    Sexual activity: Not Currently     Partners: Male     Birth control/protection: Post-menopausal, None   Other Topics Concern    Not on file   Social History Narrative    No living will     Social Determinants of Health     Financial Resource Strain: Not on file   Food Insecurity: Not on file   Transportation Needs: Not on file   Physical Activity: Not on file   Stress: Not on file   Social Connections: Not on file   Intimate Partner Violence: Not on file   Housing Stability: Not on file      Family History   Problem Relation Age of Onset    Stroke Mother         stroke syndrome    Cancer Maternal Grandmother     Stomach cancer Maternal Grandmother     No Known Problems Sister     No Known Problems Maternal Grandfather     No Known Problems Paternal Grandmother     No Known Problems Paternal Grandfather     No Known Problems Sister     No Known Problems Maternal Aunt     No Known Problems Maternal Aunt     No Known Problems Maternal Aunt     No Known Problems Maternal Aunt     No Known Problems Maternal Aunt     No Known Problems Maternal Aunt     No Known Problems Paternal Aunt     No Known Problems Paternal Aunt     No Known Problems Paternal Aunt      Past Surgical History:   Procedure Laterality Date    CARDIAC LOOP RECORDER      CHOLECYSTECTOMY      [place] RETROGRADE PYELOGRAM  12/17/2021    GALLBLADDER SURGERY      onset: 2011    [place] CYSTO/URETERO W/LITHOTRIPSY &INDWELL STENT INSRT Left 12/17/2021    Procedure: CYSTOSCOPY URETEROSCOPY WITH LITHOTRIPSY HOLMIUM LASER, RETROGRADE PYELOGRAM BASKET STONE EXTRACTION AND INSERTION STENT URETERAL;  Surgeon: Leisa Severs, MD;  [facility];  Service: Urology       Current Outpatient Medications:     ascorbic acid (VITAMIN C) 500 mg tablet, Take 500 mg by mouth daily, Disp: , Rfl:     aspirin (ECOTRIN LOW STRENGTH) 81 mg EC tablet, Take 1 tablet (81 mg total) by mouth daily, Disp: 90 tablet, Rfl: 0    atorvastatin (LIPITOR) 20 mg tablet, Take 1 tablet (20 mg total) by mouth every evening, Disp: 90 tablet, Rfl: 3    CRANBERRY PO, Take 168 mg by mouth , Disp: , Rfl:     Dextromethorphan-guaiFENesin (MUCINEX DM PO), Take by mouth, Disp: , Rfl:     Lactobacillus (ACIDOPHILUS PO), Take by mouth , Disp: , Rfl:     Multiple Vitamins-Minerals (CENTRUM PO), Take by mouth, Disp: , Rfl:     omeprazole (PriLOSEC) 20 mg delayed release capsule, Take 1 capsule (20 mg total) by mouth daily, Disp: 90 capsule, Rfl: 3    valsartan (DIOVAN) 40 mg tablet, Take 1 tablet (40 mg total) by mouth daily, Disp: 30 tablet, Rfl: 5    VITAMIN E PO, Take 184 mg by mouth , Disp: , Rfl:         Review of Systems:  Review of Systems   Constitutional: Negative for activity change, fever and unexpected weight change  HENT: Negative for facial swelling, nosebleeds and voice change  Respiratory: Negative for chest tightness, shortness of breath and wheezing  Cardiovascular: Negative for chest pain, palpitations and leg swelling  Gastrointestinal: Negative for abdominal distention  Genitourinary: Negative for hematuria  Musculoskeletal: Negative for arthralgias  Skin: Negative for color change, pallor, rash and wound  Neurological: Negative for dizziness, seizures and syncope  Psychiatric/Behavioral: Negative for agitation  Physical Exam:  Physical Exam  Vitals reviewed  Constitutional:       Appearance: She is well-developed  HENT:      Head: Normocephalic and atraumatic  Cardiovascular:      Rate and Rhythm: Normal rate and regular rhythm  Heart sounds: Normal heart sounds  Pulmonary:      Effort: Pulmonary effort is normal       Breath sounds: Normal breath sounds  Abdominal:      Palpations: Abdomen is soft  Musculoskeletal:         General: Normal range of motion  Cervical back: Normal range of motion and neck supple     Skin:     General: Skin is warm and dry    Neurological:      Mental Status: She is alert and oriented to person, place, and time  Psychiatric:         Behavior: Behavior normal          Thought Content: Thought content normal          Judgment: Judgment normal          This note was completed in part utilizing [employer] Software  Grammatical errors, random word insertions, spelling mistakes, and incomplete sentences can be an occasional consequence of this system secondary to software limitations, ambient noise, and hardware issues  If you have any questions or concerns about the content, text, or information contained within the body of this dictation, please contact the provider for clarification

## 2022-04-12 ENCOUNTER — OFFICE VISIT (OUTPATIENT)
Dept: FAMILY MEDICINE CLINIC | Facility: CLINIC | Age: 62
End: 2022-04-12
Payer: MEDICARE

## 2022-04-12 VITALS
DIASTOLIC BLOOD PRESSURE: 82 MMHG | SYSTOLIC BLOOD PRESSURE: 118 MMHG | HEART RATE: 99 BPM | HEIGHT: 61 IN | WEIGHT: 137.2 LBS | TEMPERATURE: 97.7 F | OXYGEN SATURATION: 96 % | BODY MASS INDEX: 25.9 KG/M2

## 2022-04-12 DIAGNOSIS — J01.90 ACUTE SINUSITIS, RECURRENCE NOT SPECIFIED, UNSPECIFIED LOCATION: Primary | ICD-10-CM

## 2022-04-12 PROCEDURE — 99213 OFFICE O/P EST LOW 20 MIN: CPT | Performed by: FAMILY MEDICINE

## 2022-04-12 RX ORDER — FLUTICASONE PROPIONATE 50 MCG
2 SPRAY, SUSPENSION (ML) NASAL DAILY
Qty: 16 G | Refills: 3 | Status: SHIPPED | OUTPATIENT
Start: 2022-04-12

## 2022-04-12 RX ORDER — AZITHROMYCIN 250 MG/1
TABLET, FILM COATED ORAL
Qty: 6 TABLET | Refills: 0 | Status: SHIPPED | OUTPATIENT
Start: 2022-04-12 | End: 2022-04-16

## 2022-04-12 NOTE — PROGRESS NOTES
Assessment/Plan:  Believe she is suffering from allergies  Rx put in for Flonase nose spray, and she will get fexofenadine over-the-counter  Push fluids  If symptoms continue or increase, she will start a Z-Bong and call us  Follow-up in May as scheduled or p r n     Problem List Items Addressed This Visit     None      Visit Diagnoses     Acute sinusitis, recurrence not specified, unspecified location    -  Primary    Relevant Medications    fluticasone (FLONASE) 50 mcg/act nasal spray    azithromycin (ZITHROMAX) 250 mg tablet           Diagnoses and all orders for this visit:    Acute sinusitis, recurrence not specified, unspecified location  -     fluticasone (FLONASE) 50 mcg/act nasal spray; 2 sprays into each nostril daily  -     azithromycin (ZITHROMAX) 250 mg tablet; Take 2 tablets today then 1 tablet daily x 4 days        No problem-specific Assessment & Plan notes found for this encounter  Subjective:      Patient ID: Cary Wilkinson is a 59 y o  female  Patient here today stating for the last couple days has had sinus pressure, runny nose, postnasal drip  Mild cough  No fever chills  No nausea vomiting or diarrhea  Patient tried a Neti pot, which has helped clear up her nose    Cough  This is a new problem  The current episode started in the past 7 days  The problem has been unchanged  The problem occurs every few minutes  The cough is non-productive  Associated symptoms include ear congestion, nasal congestion, postnasal drip and rhinorrhea  Pertinent negatives include no chills, fever or shortness of breath  The symptoms are aggravated by pollens  Treatments tried: Neti pot  The treatment provided moderate relief  Fatigue  Associated symptoms include congestion, coughing and fatigue  Pertinent negatives include no chills or fever         The following portions of the patient's history were reviewed and updated as appropriate:   She has a past medical history of Bladder stone, Hyperlipidemia, Stroke [facility]), and Ureteral calculus, right ,  does not have any pertinent problems on file  ,   has a past surgical history that includes Gallbladder surgery; Cholecystectomy; Cardiac Loop Recorder; pr cysto/uretero w/lithotripsy &indwell stent insrt (Left, 12/17/2021); and [place] retrograde pyelogram (12/17/2021)  ,  family history includes Cancer in her maternal grandmother; No Known Problems in her maternal aunt, maternal aunt, maternal aunt, maternal aunt, maternal aunt, maternal aunt, maternal grandfather, paternal aunt, paternal aunt, paternal aunt, paternal grandfather, paternal grandmother, sister, and sister; Stomach cancer in her maternal grandmother; Stroke in her mother  ,   reports that she has never smoked  She has never used smokeless tobacco  She reports current alcohol use  She reports that she does not use drugs  ,  is allergic to penicillin g   Current Outpatient Medications   Medication Sig Dispense Refill    ascorbic acid (VITAMIN C) 500 mg tablet Take 500 mg by mouth daily      aspirin (ECOTRIN LOW STRENGTH) 81 mg EC tablet Take 1 tablet (81 mg total) by mouth daily 90 tablet 0    atorvastatin (LIPITOR) 20 mg tablet Take 1 tablet (20 mg total) by mouth every evening 90 tablet 3    CRANBERRY PO Take 168 mg by mouth       Dextromethorphan-guaiFENesin (MUCINEX DM PO) Take by mouth      Lactobacillus (ACIDOPHILUS PO) Take by mouth       Multiple Vitamins-Minerals (CENTRUM PO) Take by mouth      omeprazole (PriLOSEC) 20 mg delayed release capsule Take 1 capsule (20 mg total) by mouth daily 90 capsule 3    valsartan (DIOVAN) 40 mg tablet Take 1 tablet (40 mg total) by mouth daily 30 tablet 5    VITAMIN E PO Take 184 mg by mouth       azithromycin (ZITHROMAX) 250 mg tablet Take 2 tablets today then 1 tablet daily x 4 days 6 tablet 0    fluticasone (FLONASE) 50 mcg/act nasal spray 2 sprays into each nostril daily 16 g 3     No current facility-administered medications for this visit  Review of Systems   Constitutional: Positive for fatigue  Negative for chills and fever  HENT: Positive for congestion, postnasal drip, rhinorrhea and sinus pressure  Respiratory: Positive for cough  Negative for shortness of breath  Cardiovascular: Negative  Gastrointestinal: Negative  Genitourinary: Negative  Objective:  Vitals:    04/12/22 0805   BP: 118/82   Pulse: 99   Temp: 97 7 °F (36 5 °C)   SpO2: 96%   Weight: 62 2 kg (137 lb 3 2 oz)   Height: 5' 1" (1 549 m)     Body mass index is 25 92 kg/m²  Physical Exam  Vitals reviewed  Constitutional:       General: She is not in acute distress  Appearance: Normal appearance  She is well-developed  She is not ill-appearing, toxic-appearing or diaphoretic  HENT:      Head: Normocephalic and atraumatic  Nose: Congestion present  Mouth/Throat:      Pharynx: No oropharyngeal exudate or posterior oropharyngeal erythema  Comments: Clear postnasal drip  Eyes:      Conjunctiva/sclera: Conjunctivae normal    Cardiovascular:      Rate and Rhythm: Normal rate and regular rhythm  Heart sounds: Normal heart sounds  No murmur heard  No friction rub  No gallop  Pulmonary:      Effort: Pulmonary effort is normal  No respiratory distress  Breath sounds: Normal breath sounds  No wheezing, rhonchi or rales  Musculoskeletal:      Right lower leg: No edema  Left lower leg: No edema  Neurological:      General: No focal deficit present  Mental Status: She is alert and oriented to person, place, and time  Psychiatric:         Mood and Affect: Mood normal          Behavior: Behavior normal          Thought Content:  Thought content normal          Judgment: Judgment normal

## 2022-04-21 ENCOUNTER — REMOTE DEVICE CLINIC VISIT (OUTPATIENT)
Dept: CARDIOLOGY CLINIC | Facility: CLINIC | Age: 62
End: 2022-04-21
Payer: MEDICARE

## 2022-04-21 DIAGNOSIS — Z95.818 PRESENCE OF OTHER CARDIAC IMPLANTS AND GRAFTS: Primary | ICD-10-CM

## 2022-04-21 PROCEDURE — G2066 INTER DEVC REMOTE 30D: HCPCS | Performed by: INTERNAL MEDICINE

## 2022-04-21 PROCEDURE — 93298 REM INTERROG DEV EVAL SCRMS: CPT | Performed by: INTERNAL MEDICINE

## 2022-04-21 NOTE — PROGRESS NOTES
MDT-LOOP RECORDER   CARELINK TRANSMISSION: LOOP RECORDER  PRESENTING RHYTHM ST @ 104 BPM  BATTERY STATUS "OK " NO PATIENT OR DEVICE ACTIVATED EPISODES  NORMAL DEVICE FUNCTION   DL

## 2022-05-13 ENCOUNTER — RA CDI HCC (OUTPATIENT)
Dept: OTHER | Facility: HOSPITAL | Age: 62
End: 2022-05-13

## 2022-05-13 NOTE — PROGRESS NOTES
[address]  coding opportunities       Chart reviewed, no opportunity found: CHART REVIEWED, NO OPPORTUNITY FOUND        Patients Insurance     Medicare Insurance: Medicare

## 2022-05-23 ENCOUNTER — REMOTE DEVICE CLINIC VISIT (OUTPATIENT)
Dept: CARDIOLOGY CLINIC | Facility: CLINIC | Age: 62
End: 2022-05-23
Payer: MEDICARE

## 2022-05-23 DIAGNOSIS — Z95.818 PRESENCE OF OTHER CARDIAC IMPLANTS AND GRAFTS: Primary | ICD-10-CM

## 2022-05-23 PROCEDURE — G2066 INTER DEVC REMOTE 30D: HCPCS | Performed by: INTERNAL MEDICINE

## 2022-05-23 PROCEDURE — 93298 REM INTERROG DEV EVAL SCRMS: CPT | Performed by: INTERNAL MEDICINE

## 2022-05-23 NOTE — PROGRESS NOTES
MDT-LOOP RECORDER   CARELINK TRANSMISSION- 1  [place]  EPISODE CHECK PER DR MAIER: BATTERY STATUS "OK " PRESENTING RHYTHM SHOWS SR @ 80 BPM  NO PATIENT OR DEVICE ACTIVATED EPISODES   NORMAL DEVICE FUNCTION   ES

## 2022-05-31 ENCOUNTER — TELEPHONE (OUTPATIENT)
Dept: FAMILY MEDICINE CLINIC | Facility: CLINIC | Age: 62
End: 2022-05-31

## 2022-05-31 DIAGNOSIS — J01.90 ACUTE SINUSITIS, RECURRENCE NOT SPECIFIED, UNSPECIFIED LOCATION: Primary | ICD-10-CM

## 2022-05-31 RX ORDER — AZITHROMYCIN 250 MG/1
TABLET, FILM COATED ORAL
Qty: 6 TABLET | Refills: 0 | Status: SHIPPED | OUTPATIENT
Start: 2022-05-31 | End: 2022-06-04

## 2022-05-31 NOTE — TELEPHONE ENCOUNTER
Wanted to know if she can get another antibiotic, has the same thing as last month with the allergies   Thick mucous, test negative for covid with home test

## 2022-06-02 ENCOUNTER — OFFICE VISIT (OUTPATIENT)
Dept: FAMILY MEDICINE CLINIC | Facility: CLINIC | Age: 62
End: 2022-06-02
Payer: MEDICARE

## 2022-06-02 VITALS
TEMPERATURE: 96.9 F | DIASTOLIC BLOOD PRESSURE: 80 MMHG | WEIGHT: 136.2 LBS | HEART RATE: 94 BPM | BODY MASS INDEX: 25.71 KG/M2 | SYSTOLIC BLOOD PRESSURE: 110 MMHG | HEIGHT: 61 IN | OXYGEN SATURATION: 96 %

## 2022-06-02 DIAGNOSIS — J01.90 ACUTE SINUSITIS, RECURRENCE NOT SPECIFIED, UNSPECIFIED LOCATION: Primary | ICD-10-CM

## 2022-06-02 DIAGNOSIS — I10 ESSENTIAL HYPERTENSION: ICD-10-CM

## 2022-06-02 PROCEDURE — 99214 OFFICE O/P EST MOD 30 MIN: CPT | Performed by: FAMILY MEDICINE

## 2022-06-02 RX ORDER — FEXOFENADINE HCL 180 MG/1
180 TABLET ORAL DAILY
Qty: 30 TABLET | Refills: 5 | Status: SHIPPED | OUTPATIENT
Start: 2022-06-02

## 2022-06-02 NOTE — PROGRESS NOTES
Assessment/Plan:  Patient will continue and finish the Zithromax  Told her to take the Mucinex twice a day  Push fluids  Rx put in for fexofenadine  Call symptoms continue or increase  Blood pressure is controlled on her medications  I will see her back in 3-4 months or p r n     Problem List Items Addressed This Visit        Cardiovascular and Mediastinum    Essential hypertension - Primary      Other Visit Diagnoses     Acute sinusitis, recurrence not specified, unspecified location        Relevant Medications    fexofenadine (ALLEGRA) 180 MG tablet           Diagnoses and all orders for this visit:    Essential hypertension    Acute sinusitis, recurrence not specified, unspecified location  -     fexofenadine (ALLEGRA) 180 MG tablet; Take 1 tablet (180 mg total) by mouth daily        No problem-specific Assessment & Plan notes found for this encounter  Subjective:      Patient ID: Radha Mosley is a 59 y o  female  Patient here today for follow-up  Patient denies any chest pain or shortness of breath  Patient still feeling congested  It has been on Zithromax 2 days  She has been taking Mucinex once a day  Denies any fever chills  Patient has been tolerating her valsartan well for her blood pressure    Hypertension  This is a chronic problem  The problem is controlled  There are no associated agents to hypertension  Risk factors for coronary artery disease include dyslipidemia  Past treatments include angiotensin blockers  The current treatment provides significant improvement  There are no compliance problems  The following portions of the patient's history were reviewed and updated as appropriate:   She has a past medical history of Bladder stone, Hyperlipidemia, Stroke ([address] ), and Ureteral calculus, right ,  does not have any pertinent problems on file  ,   has a past surgical history that includes Gallbladder surgery;  Cholecystectomy; Cardiac Loop Recorder; pr cysto/uretero w/lithotripsy &indwell stent insrt (Left, 12/17/2021); and FL retrograde pyelogram (12/17/2021)  ,  family history includes Cancer in her maternal grandmother; No Known Problems in her maternal aunt, maternal aunt, maternal aunt, maternal aunt, maternal aunt, maternal aunt, maternal grandfather, paternal aunt, paternal aunt, paternal aunt, paternal grandfather, paternal grandmother, sister, and sister; Stomach cancer in her maternal grandmother; Stroke in her mother  ,   reports that she has never smoked  She has never used smokeless tobacco  She reports current alcohol use  She reports that she does not use drugs  ,  is allergic to penicillin g   Current Outpatient Medications   Medication Sig Dispense Refill    ascorbic acid (VITAMIN C) 500 mg tablet Take 500 mg by mouth daily      aspirin (ECOTRIN LOW STRENGTH) 81 mg EC tablet Take 1 tablet (81 mg total) by mouth daily 90 tablet 0    atorvastatin (LIPITOR) 20 mg tablet Take 1 tablet (20 mg total) by mouth every evening 90 tablet 3    azithromycin (ZITHROMAX) 250 mg tablet Take 2 tablets today then 1 tablet daily x 4 days 6 tablet 0    CRANBERRY PO Take 168 mg by mouth       Dextromethorphan-guaiFENesin (MUCINEX DM PO) Take by mouth      fexofenadine (ALLEGRA) 180 MG tablet Take 1 tablet (180 mg total) by mouth daily 30 tablet 5    fluticasone (FLONASE) 50 mcg/act nasal spray 2 sprays into each nostril daily 16 g 3    Lactobacillus (ACIDOPHILUS PO) Take by mouth       Multiple Vitamins-Minerals (CENTRUM PO) Take by mouth      omeprazole (PriLOSEC) 20 mg delayed release capsule Take 1 capsule (20 mg total) by mouth daily 90 capsule 3    valsartan (DIOVAN) 40 mg tablet Take 1 tablet (40 mg total) by mouth daily 30 tablet 5    VITAMIN E PO Take 184 mg by mouth        No current facility-administered medications for this visit  Review of Systems   Constitutional: Negative  HENT: Positive for congestion  Respiratory: Negative      Cardiovascular: Negative  Gastrointestinal: Negative  Genitourinary: Negative  Objective:  Vitals:    06/02/22 0815   BP: 110/80   Pulse: 94   Temp: (!) 96 9 °F (36 1 °C)   SpO2: 96%   Weight: 61 8 kg (136 lb 3 2 oz)   Height: 5' 1" (1 549 m)     Body mass index is 25 73 kg/m²  Physical Exam  Vitals reviewed  Constitutional:       General: She is not in acute distress  Appearance: Normal appearance  She is well-developed  She is not ill-appearing, toxic-appearing or diaphoretic  HENT:      Head: Normocephalic and atraumatic  Eyes:      Conjunctiva/sclera: Conjunctivae normal    Cardiovascular:      Rate and Rhythm: Normal rate and regular rhythm  Heart sounds: Normal heart sounds  No murmur heard  No friction rub  No gallop  Pulmonary:      Effort: Pulmonary effort is normal  No respiratory distress  Breath sounds: Normal breath sounds  No wheezing, rhonchi or rales  Musculoskeletal:      Right lower leg: No edema  Left lower leg: No edema  Neurological:      General: No focal deficit present  Mental Status: She is alert and oriented to person, place, and time  Psychiatric:         Mood and Affect: Mood normal          Behavior: Behavior normal          Thought Content:  Thought content normal          Judgment: Judgment normal

## 2022-06-22 ENCOUNTER — REMOTE DEVICE CLINIC VISIT (OUTPATIENT)
Dept: CARDIOLOGY CLINIC | Facility: CLINIC | Age: 62
End: 2022-06-22
Payer: MEDICARE

## 2022-06-22 DIAGNOSIS — Z95.818 PRESENCE OF OTHER CARDIAC IMPLANTS AND GRAFTS: Primary | ICD-10-CM

## 2022-06-22 PROCEDURE — 93298 REM INTERROG DEV EVAL SCRMS: CPT | Performed by: INTERNAL MEDICINE

## 2022-06-22 PROCEDURE — G2066 INTER DEVC REMOTE 30D: HCPCS | Performed by: INTERNAL MEDICINE

## 2022-06-22 NOTE — PROGRESS NOTES
MDT-LOOP RECORDER   CARELINK TRANSMISSION: LOOP RECORDER  PRESENTING RHYTHM ST @ 107 BPM  BATTERY STATUS "OK " NO PATIENT OR DEVICE ACTIVATED EPISODES  NORMAL DEVICE FUNCTION   DL

## 2022-08-05 DIAGNOSIS — I10 ESSENTIAL HYPERTENSION: ICD-10-CM

## 2022-08-05 RX ORDER — VALSARTAN 40 MG/1
40 TABLET ORAL DAILY
Qty: 30 TABLET | Refills: 5 | Status: SHIPPED | OUTPATIENT
Start: 2022-08-05

## 2022-08-26 ENCOUNTER — RA CDI HCC (OUTPATIENT)
Dept: OTHER | Facility: HOSPITAL | Age: 62
End: 2022-08-26

## 2022-09-07 ENCOUNTER — OFFICE VISIT (OUTPATIENT)
Dept: FAMILY MEDICINE CLINIC | Facility: CLINIC | Age: 62
End: 2022-09-07
Payer: MEDICARE

## 2022-09-07 VITALS
WEIGHT: 145.8 LBS | HEART RATE: 74 BPM | SYSTOLIC BLOOD PRESSURE: 126 MMHG | DIASTOLIC BLOOD PRESSURE: 94 MMHG | TEMPERATURE: 96 F | HEIGHT: 61 IN | OXYGEN SATURATION: 95 % | BODY MASS INDEX: 27.53 KG/M2

## 2022-09-07 DIAGNOSIS — I10 ESSENTIAL HYPERTENSION: ICD-10-CM

## 2022-09-07 DIAGNOSIS — R39.11 URINARY HESITANCY: Primary | ICD-10-CM

## 2022-09-07 DIAGNOSIS — E78.49 OTHER HYPERLIPIDEMIA: ICD-10-CM

## 2022-09-07 LAB
SL AMB  POCT GLUCOSE, UA: NEGATIVE
SL AMB LEUKOCYTE ESTERASE,UA: ABNORMAL
SL AMB POCT BILIRUBIN,UA: NEGATIVE
SL AMB POCT BLOOD,UA: NEGATIVE
SL AMB POCT CLARITY,UA: CLEAR
SL AMB POCT COLOR,UA: YELLOW
SL AMB POCT KETONES,UA: ABNORMAL
SL AMB POCT NITRITE,UA: NEGATIVE
SL AMB POCT PH,UA: 6
SL AMB POCT SPECIFIC GRAVITY,UA: 1.02
SL AMB POCT URINE PROTEIN: NEGATIVE
SL AMB POCT UROBILINOGEN: 0.2

## 2022-09-07 PROCEDURE — 87086 URINE CULTURE/COLONY COUNT: CPT | Performed by: FAMILY MEDICINE

## 2022-09-07 PROCEDURE — 99214 OFFICE O/P EST MOD 30 MIN: CPT | Performed by: FAMILY MEDICINE

## 2022-09-07 PROCEDURE — 81002 URINALYSIS NONAUTO W/O SCOPE: CPT | Performed by: FAMILY MEDICINE

## 2022-09-07 RX ORDER — SULFAMETHOXAZOLE AND TRIMETHOPRIM 800; 160 MG/1; MG/1
1 TABLET ORAL 2 TIMES DAILY
Qty: 6 TABLET | Refills: 0 | Status: SHIPPED | OUTPATIENT
Start: 2022-09-07 | End: 2022-09-10

## 2022-09-07 NOTE — PROGRESS NOTES
Assessment/Plan:  Urinalysis shows 2+ leukocytes and 1+ ketones  No nitrates or blood  Culture sent out  Rx put in for Bactrim in case her culture is positive  Patient will monitor her pressure at home and call us with results  Follow-up here in 3-4 months, getting blood work before that visit  Problem List Items Addressed This Visit        Cardiovascular and Mediastinum    Essential hypertension    Relevant Orders    CBC and differential    Comprehensive metabolic panel    TSH, 3rd generation with Free T4 reflex       Other    Other hyperlipidemia    Relevant Orders    Comprehensive metabolic panel    Lipid Panel with Direct LDL reflex    TSH, 3rd generation with Free T4 reflex      Other Visit Diagnoses     Urinary hesitancy    -  Primary    Relevant Medications    sulfamethoxazole-trimethoprim (BACTRIM DS) 800-160 mg per tablet    Other Relevant Orders    POCT urine dip (Completed)    Urine culture    CBC and differential           Diagnoses and all orders for this visit:    Urinary hesitancy  -     POCT urine dip  -     Urine culture; Future  -     Urine culture  -     CBC and differential  -     sulfamethoxazole-trimethoprim (BACTRIM DS) 800-160 mg per tablet; Take 1 tablet by mouth 2 (two) times a day for 3 days    Essential hypertension  -     CBC and differential  -     Comprehensive metabolic panel  -     TSH, 3rd generation with Free T4 reflex    Other hyperlipidemia  -     Comprehensive metabolic panel  -     Lipid Panel with Direct LDL reflex  -     TSH, 3rd generation with Free T4 reflex      No problem-specific Assessment & Plan notes found for this encounter  Subjective:      Patient ID: Alyssa Herrera is a 57 y o  female  Patient here today follow-up  Patient states for last week or so has had some itching and burning with urination  Nothing severe  No fever chills  Otherwise patient has been feeling well, no chest pain or shortness of breath    Tolerating medications well      The following portions of the patient's history were reviewed and updated as appropriate:   She has a past medical history of Bladder stone, Hyperlipidemia, Stroke ([address] ), and Ureteral calculus, right ,  does not have any pertinent problems on file  ,   has a past surgical history that includes Gallbladder surgery; Cholecystectomy; Cardiac Loop Recorder; pr cysto/uretero w/lithotripsy &indwell stent insrt (Left, 12/17/2021); and FL retrograde pyelogram (12/17/2021)  ,  family history includes Cancer in her maternal grandmother; No Known Problems in her maternal aunt, maternal aunt, maternal aunt, maternal aunt, maternal aunt, maternal aunt, maternal grandfather, paternal aunt, paternal aunt, paternal aunt, paternal grandfather, paternal grandmother, sister, and sister; Stomach cancer in her maternal grandmother; Stroke in her mother  ,   reports that she has never smoked  She has never used smokeless tobacco  She reports current alcohol use  She reports that she does not use drugs  ,  is allergic to penicillin g   Current Outpatient Medications   Medication Sig Dispense Refill    ascorbic acid (VITAMIN C) 500 mg tablet Take 500 mg by mouth daily      aspirin (ECOTRIN LOW STRENGTH) 81 mg EC tablet Take 1 tablet (81 mg total) by mouth daily 90 tablet 0    atorvastatin (LIPITOR) 20 mg tablet Take 1 tablet (20 mg total) by mouth every evening 90 tablet 3    CRANBERRY PO Take 168 mg by mouth       Dextromethorphan-guaiFENesin (MUCINEX DM PO) Take by mouth      fexofenadine (ALLEGRA) 180 MG tablet Take 1 tablet (180 mg total) by mouth daily 30 tablet 5    fluticasone (FLONASE) 50 mcg/act nasal spray 2 sprays into each nostril daily 16 g 3    Lactobacillus (ACIDOPHILUS PO) Take by mouth       Multiple Vitamins-Minerals (CENTRUM PO) Take by mouth      omeprazole (PriLOSEC) 20 mg delayed release capsule Take 1 capsule (20 mg total) by mouth daily 90 capsule 3    sulfamethoxazole-trimethoprim (BACTRIM DS) 800-160 mg per tablet Take 1 tablet by mouth 2 (two) times a day for 3 days 6 tablet 0    valsartan (DIOVAN) 40 mg tablet Take 1 tablet (40 mg total) by mouth daily 30 tablet 5    VITAMIN E PO Take 184 mg by mouth        No current facility-administered medications for this visit  Review of Systems   Constitutional: Negative  Respiratory: Negative  Cardiovascular: Negative  Gastrointestinal: Negative  Genitourinary: Positive for dysuria  Objective:  Vitals:    09/07/22 1216 09/07/22 1235   BP: 148/90 126/94   BP Location:  Left arm   Patient Position:  Sitting   Cuff Size:  Standard   Pulse: 74    Temp: (!) 96 °F (35 6 °C)    SpO2: 95%    Weight: 66 1 kg (145 lb 12 8 oz)    Height: 5' 1" (1 549 m)      Body mass index is 27 55 kg/m²  Physical Exam  Vitals reviewed  Constitutional:       General: She is not in acute distress  Appearance: Normal appearance  She is well-developed  She is not ill-appearing, toxic-appearing or diaphoretic  HENT:      Head: Normocephalic and atraumatic  Eyes:      Conjunctiva/sclera: Conjunctivae normal    Cardiovascular:      Rate and Rhythm: Normal rate and regular rhythm  Heart sounds: Normal heart sounds  No murmur heard  No friction rub  No gallop  Pulmonary:      Effort: Pulmonary effort is normal  No respiratory distress  Breath sounds: Normal breath sounds  No wheezing, rhonchi or rales  Musculoskeletal:      Right lower leg: No edema  Left lower leg: No edema  Neurological:      General: No focal deficit present  Mental Status: She is alert and oriented to person, place, and time  Psychiatric:         Mood and Affect: Mood normal          Behavior: Behavior normal          Thought Content:  Thought content normal          Judgment: Judgment normal

## 2022-09-08 LAB — BACTERIA UR CULT: NORMAL

## 2022-09-26 ENCOUNTER — REMOTE DEVICE CLINIC VISIT (OUTPATIENT)
Dept: CARDIOLOGY CLINIC | Facility: CLINIC | Age: 62
End: 2022-09-26
Payer: MEDICARE

## 2022-09-26 DIAGNOSIS — Z95.818 PRESENCE OF OTHER CARDIAC IMPLANTS AND GRAFTS: Primary | ICD-10-CM

## 2022-09-26 PROCEDURE — 93298 REM INTERROG DEV EVAL SCRMS: CPT | Performed by: INTERNAL MEDICINE

## 2022-09-26 PROCEDURE — G2066 INTER DEVC REMOTE 30D: HCPCS | Performed by: INTERNAL MEDICINE

## 2022-09-26 NOTE — PROGRESS NOTES
MDT-LOOP RECORDER   CARELINK TRANSMISSION: LOOP RECORDER  PRESENTING RHYTHM NSR @ 84 BPM  BATTERY STATUS "OK " NO PATIENT OR DEVICE ACTIVATED EPISODES  NORMAL DEVICE FUNCTION   DL

## 2022-10-10 ENCOUNTER — HOSPITAL ENCOUNTER (OUTPATIENT)
Dept: MAMMOGRAPHY | Facility: HOSPITAL | Age: 62
Discharge: HOME | End: 2022-10-10
Attending: OBSTETRICS & GYNECOLOGY
Payer: MEDICARE

## 2022-10-10 VITALS — BODY MASS INDEX: 27.51 KG/M2 | WEIGHT: 145.72 LBS | HEIGHT: 61 IN

## 2022-10-10 DIAGNOSIS — Z12.31 ENCOUNTER FOR SCREENING MAMMOGRAM FOR MALIGNANT NEOPLASM OF BREAST: ICD-10-CM

## 2022-10-10 PROCEDURE — 77067 SCR MAMMO BI INCL CAD: CPT

## 2022-10-10 PROCEDURE — 77063 BREAST TOMOSYNTHESIS BI: CPT

## 2022-11-08 ENCOUNTER — REMOTE DEVICE CLINIC VISIT (OUTPATIENT)
Dept: CARDIOLOGY CLINIC | Facility: CLINIC | Age: 62
End: 2022-11-08

## 2022-11-08 DIAGNOSIS — Z95.818 PRESENCE OF OTHER CARDIAC IMPLANTS AND GRAFTS: Primary | ICD-10-CM

## 2022-11-16 DIAGNOSIS — I63.512 ACUTE ISCHEMIC LEFT MIDDLE CEREBRAL ARTERY (MCA) STROKE (HCC): ICD-10-CM

## 2022-11-16 RX ORDER — OMEPRAZOLE 20 MG/1
20 CAPSULE, DELAYED RELEASE ORAL DAILY
Qty: 90 CAPSULE | Refills: 3 | Status: SHIPPED | OUTPATIENT
Start: 2022-11-16

## 2022-11-26 DIAGNOSIS — E78.49 OTHER HYPERLIPIDEMIA: ICD-10-CM

## 2022-11-28 RX ORDER — ATORVASTATIN CALCIUM 20 MG/1
TABLET, FILM COATED ORAL
Qty: 90 TABLET | Refills: 3 | Status: SHIPPED | OUTPATIENT
Start: 2022-11-28

## 2022-12-02 ENCOUNTER — APPOINTMENT (OUTPATIENT)
Dept: LAB | Facility: CLINIC | Age: 62
End: 2022-12-02

## 2022-12-02 ENCOUNTER — RA CDI HCC (OUTPATIENT)
Dept: OTHER | Facility: HOSPITAL | Age: 62
End: 2022-12-02

## 2022-12-02 LAB
ALBUMIN SERPL BCP-MCNC: 3.8 G/DL (ref 3.5–5)
ALP SERPL-CCNC: 120 U/L (ref 46–116)
ALT SERPL W P-5'-P-CCNC: 24 U/L (ref 12–78)
ANION GAP SERPL CALCULATED.3IONS-SCNC: 5 MMOL/L (ref 4–13)
AST SERPL W P-5'-P-CCNC: 25 U/L (ref 5–45)
BASOPHILS # BLD AUTO: 0.06 THOUSANDS/ÂΜL (ref 0–0.1)
BASOPHILS NFR BLD AUTO: 1 % (ref 0–1)
BILIRUB SERPL-MCNC: 0.67 MG/DL (ref 0.2–1)
BUN SERPL-MCNC: 19 MG/DL (ref 5–25)
CALCIUM SERPL-MCNC: 9.5 MG/DL (ref 8.3–10.1)
CHLORIDE SERPL-SCNC: 108 MMOL/L (ref 96–108)
CHOLEST SERPL-MCNC: 173 MG/DL
CO2 SERPL-SCNC: 23 MMOL/L (ref 21–32)
CREAT SERPL-MCNC: 0.74 MG/DL (ref 0.6–1.3)
EOSINOPHIL # BLD AUTO: 0.21 THOUSAND/ÂΜL (ref 0–0.61)
EOSINOPHIL NFR BLD AUTO: 3 % (ref 0–6)
ERYTHROCYTE [DISTWIDTH] IN BLOOD BY AUTOMATED COUNT: 12.9 % (ref 11.6–15.1)
GFR SERPL CREATININE-BSD FRML MDRD: 85 ML/MIN/1.73SQ M
GLUCOSE P FAST SERPL-MCNC: 93 MG/DL (ref 65–99)
HCT VFR BLD AUTO: 43.7 % (ref 34.8–46.1)
HDLC SERPL-MCNC: 71 MG/DL
HGB BLD-MCNC: 14.2 G/DL (ref 11.5–15.4)
IMM GRANULOCYTES # BLD AUTO: 0.02 THOUSAND/UL (ref 0–0.2)
IMM GRANULOCYTES NFR BLD AUTO: 0 % (ref 0–2)
LDLC SERPL CALC-MCNC: 86 MG/DL (ref 0–100)
LYMPHOCYTES # BLD AUTO: 2.17 THOUSANDS/ÂΜL (ref 0.6–4.47)
LYMPHOCYTES NFR BLD AUTO: 35 % (ref 14–44)
MCH RBC QN AUTO: 30.4 PG (ref 26.8–34.3)
MCHC RBC AUTO-ENTMCNC: 32.5 G/DL (ref 31.4–37.4)
MCV RBC AUTO: 94 FL (ref 82–98)
MONOCYTES # BLD AUTO: 0.72 THOUSAND/ÂΜL (ref 0.17–1.22)
MONOCYTES NFR BLD AUTO: 12 % (ref 4–12)
NEUTROPHILS # BLD AUTO: 3.07 THOUSANDS/ÂΜL (ref 1.85–7.62)
NEUTS SEG NFR BLD AUTO: 49 % (ref 43–75)
NRBC BLD AUTO-RTO: 0 /100 WBCS
PLATELET # BLD AUTO: 258 THOUSANDS/UL (ref 149–390)
PMV BLD AUTO: 9.3 FL (ref 8.9–12.7)
POTASSIUM SERPL-SCNC: 3.8 MMOL/L (ref 3.5–5.3)
PROT SERPL-MCNC: 7.3 G/DL (ref 6.4–8.4)
RBC # BLD AUTO: 4.67 MILLION/UL (ref 3.81–5.12)
SODIUM SERPL-SCNC: 136 MMOL/L (ref 135–147)
TRIGL SERPL-MCNC: 81 MG/DL
TSH SERPL DL<=0.05 MIU/L-ACNC: 3.47 UIU/ML (ref 0.45–4.5)
WBC # BLD AUTO: 6.25 THOUSAND/UL (ref 4.31–10.16)

## 2022-12-06 ENCOUNTER — TELEPHONE (OUTPATIENT)
Dept: FAMILY MEDICINE CLINIC | Facility: CLINIC | Age: 62
End: 2022-12-06

## 2022-12-06 DIAGNOSIS — J01.90 ACUTE SINUSITIS, RECURRENCE NOT SPECIFIED, UNSPECIFIED LOCATION: Primary | ICD-10-CM

## 2022-12-06 RX ORDER — AZITHROMYCIN 250 MG/1
TABLET, FILM COATED ORAL
Qty: 6 TABLET | Refills: 0 | Status: SHIPPED | OUTPATIENT
Start: 2022-12-06 | End: 2022-12-10

## 2022-12-06 NOTE — TELEPHONE ENCOUNTER
Asking if you would prescribe her a z-maurice for sinusitis that she gets? Will be seeing you on Thursday  Uses [facility]

## 2022-12-08 ENCOUNTER — OFFICE VISIT (OUTPATIENT)
Dept: FAMILY MEDICINE CLINIC | Facility: CLINIC | Age: 62
End: 2022-12-08

## 2022-12-08 VITALS
OXYGEN SATURATION: 97 % | TEMPERATURE: 97.5 F | WEIGHT: 142.8 LBS | SYSTOLIC BLOOD PRESSURE: 108 MMHG | HEIGHT: 61 IN | BODY MASS INDEX: 26.96 KG/M2 | HEART RATE: 95 BPM | DIASTOLIC BLOOD PRESSURE: 84 MMHG

## 2022-12-08 DIAGNOSIS — R91.8 LUNG NODULES: ICD-10-CM

## 2022-12-08 DIAGNOSIS — E78.49 OTHER HYPERLIPIDEMIA: ICD-10-CM

## 2022-12-08 DIAGNOSIS — I10 ESSENTIAL HYPERTENSION: Primary | ICD-10-CM

## 2022-12-08 NOTE — PROGRESS NOTES
Name: Boris Salgado      : 1960      MRN: [id number]  Encounter Provider: Johnathan Ho DO  Encounter Date: 2022   Encounter department: [address] [place] [address]   Patient will continue same medications  I ordered a CAT scan of her chest to reassess her pulmonary nodules  Follow-up with specialist as scheduled  Follow-up here in 4 months or as needed  Lila Cao was seen today for follow-up  Diagnoses and all orders for this visit:    Essential hypertension    Lung nodules  -     CT chest wo contrast; Future    Other hyperlipidemia      Depression Screening and Follow-up Plan: Patient was screened for depression during today's encounter  They screened negative with a PHQ-2 score of 0  Subjective     Patient here today for follow-up  Patient is feeling better from her sinus infection  No new concerns  Review of Systems   Constitutional: Negative  Respiratory: Negative  Cardiovascular: Negative  Gastrointestinal: Negative  Genitourinary: Negative          Past Medical History:   Diagnosis Date   • Bladder stone    • Hyperlipidemia    • Stroke [facility])     Left hand   • Ureteral calculus, right      Past Surgical History:   Procedure Laterality Date   • CARDIAC LOOP RECORDER     • CHOLECYSTECTOMY     • FL RETROGRADE PYELOGRAM  2021   • GALLBLADDER SURGERY      onset:    • NM CYSTO/URETERO W/LITHOTRIPSY &INDWELL STENT INSRT Left 2021    Procedure: CYSTOSCOPY URETEROSCOPY WITH LITHOTRIPSY HOLMIUM LASER, RETROGRADE PYELOGRAM BASKET STONE EXTRACTION AND INSERTION STENT URETERAL;  Surgeon: Lissette Sanon MD;  Location:  MAIN OR;  Service: Urology     Family History   Problem Relation Age of Onset   • Stroke Mother         stroke syndrome   • Cancer Maternal Grandmother    • Stomach cancer Maternal Grandmother    • No Known Problems Sister    • No Known Problems Maternal Grandfather    • No Known Problems Paternal Grandmother    • No Known Problems Paternal Grandfather    • No Known Problems Sister    • No Known Problems Maternal Aunt    • No Known Problems Maternal Aunt    • No Known Problems Maternal Aunt    • No Known Problems Maternal Aunt    • No Known Problems Maternal Aunt    • No Known Problems Maternal Aunt    • No Known Problems Paternal Aunt    • No Known Problems Paternal Aunt    • No Known Problems Paternal Aunt      Social History     Socioeconomic History   • Marital status:       Spouse name: None   • Number of children: None   • Years of education: None   • Highest education level: None   Occupational History   • None   Tobacco Use   • Smoking status: Never   • Smokeless tobacco: Never   Vaping Use   • Vaping Use: Never used   Substance and Sexual Activity   • Alcohol use: Yes     Comment: social   • Drug use: Never   • Sexual activity: Not Currently     Partners: Male     Birth control/protection: Post-menopausal, None   Other Topics Concern   • None   Social History Narrative    No living will     Social Determinants of Health     Financial Resource Strain: Not on file   Food Insecurity: Not on file   Transportation Needs: Not on file   Physical Activity: Not on file   Stress: Not on file   Social Connections: Not on file   Intimate Partner Violence: Not on file   Housing Stability: Not on file     Current Outpatient Medications on File Prior to Visit   Medication Sig   • ascorbic acid (VITAMIN C) 500 mg tablet Take 500 mg by mouth daily   • aspirin (ECOTRIN LOW STRENGTH) 81 mg EC tablet Take 1 tablet (81 mg total) by mouth daily   • atorvastatin (LIPITOR) 20 mg tablet TAKE 1 TABLET EVERY EVENING   • azithromycin (ZITHROMAX) 250 mg tablet Take 2 tablets today then 1 tablet daily x 4 days   • CRANBERRY PO Take 168 mg by mouth    • Dextromethorphan-guaiFENesin (MUCINEX DM PO) Take by mouth   • fexofenadine (ALLEGRA) 180 MG tablet Take 1 tablet (180 mg total) by mouth daily   • fluticasone (FLONASE) 50 mcg/act nasal spray 2 sprays into each nostril daily   • Lactobacillus (ACIDOPHILUS PO) Take by mouth    • Multiple Vitamins-Minerals (CENTRUM PO) Take by mouth   • omeprazole (PriLOSEC) 20 mg delayed release capsule Take 1 capsule (20 mg total) by mouth daily   • valsartan (DIOVAN) 40 mg tablet Take 1 tablet (40 mg total) by mouth daily   • VITAMIN E PO Take 184 mg by mouth      Allergies   Allergen Reactions   • Penicillin G Hives     Rash     Immunization History   Administered Date(s) Administered   • COVID-19 MODERNA VACC 0 25 ML IM BOOSTER 11/09/2021   • COVID-19 MODERNA VACC 0 5 ML IM 03/24/2021, 04/22/2021, 08/16/2022   • INFLUENZA 11/01/2018   • Influenza Injectable, MDCK, Preservative Free, Quadrivalent, 0 5 mL 10/12/2022   • Influenza Quadrivalent Preservative Free 3 years and older IM 10/15/2014, 11/03/2015, 11/14/2016, 11/06/2017   • Influenza, recombinant, quadrivalent,injectable, preservative free 09/01/2020, 10/01/2021   • Influenza, seasonal, injectable 1960       Objective     /84   Pulse 95   Temp 97 5 °F (36 4 °C)   Ht 5' 1" (1 549 m)   Wt 64 8 kg (142 lb 12 8 oz)   LMP  (LMP Unknown)   SpO2 97%   BMI 26 98 kg/m²     Physical Exam  Vitals reviewed  Constitutional:       General: She is not in acute distress  Appearance: Normal appearance  She is well-developed  She is not ill-appearing, toxic-appearing or diaphoretic  HENT:      Head: Normocephalic and atraumatic  Eyes:      Conjunctiva/sclera: Conjunctivae normal    Cardiovascular:      Rate and Rhythm: Normal rate and regular rhythm  Heart sounds: Normal heart sounds  No murmur heard  No friction rub  No gallop  Pulmonary:      Effort: Pulmonary effort is normal  No respiratory distress  Breath sounds: Normal breath sounds  No wheezing, rhonchi or rales  Musculoskeletal:      Right lower leg: No edema  Left lower leg: No edema  Neurological:      General: No focal deficit present        Mental Status: She is alert and oriented to person, place, and time  Psychiatric:         Mood and Affect: Mood normal          Behavior: Behavior normal          Thought Content:  Thought content normal          Judgment: Judgment normal        Rebecca Angulo, DO

## 2022-12-09 ENCOUNTER — TELEPHONE (OUTPATIENT)
Dept: FAMILY MEDICINE CLINIC | Facility: CLINIC | Age: 62
End: 2022-12-09

## 2022-12-09 DIAGNOSIS — J01.90 ACUTE SINUSITIS, RECURRENCE NOT SPECIFIED, UNSPECIFIED LOCATION: Primary | ICD-10-CM

## 2022-12-09 RX ORDER — METHYLPREDNISOLONE 4 MG/1
TABLET ORAL
Qty: 21 EACH | Refills: 0 | Status: SHIPPED | OUTPATIENT
Start: 2022-12-09

## 2022-12-09 NOTE — TELEPHONE ENCOUNTER
Pt on Z-pack and feels like it is giving her the diarrhea  She has 1 more day left and still c/o congestion as well  Asking if you can prescribe her something else?

## 2022-12-09 NOTE — TELEPHONE ENCOUNTER
Antibiotic could give her loose stools  Suggest trying to finish it, get probiotic over-the-counter  I will put in for a Medrol Dosepak to help with congestion    Call if symptoms continue or increase

## 2022-12-13 ENCOUNTER — ANNUAL EXAM (OUTPATIENT)
Dept: OBGYN CLINIC | Facility: MEDICAL CENTER | Age: 62
End: 2022-12-13

## 2022-12-13 VITALS
HEIGHT: 61 IN | BODY MASS INDEX: 27.56 KG/M2 | DIASTOLIC BLOOD PRESSURE: 80 MMHG | SYSTOLIC BLOOD PRESSURE: 125 MMHG | WEIGHT: 146 LBS

## 2022-12-13 DIAGNOSIS — Z01.419 ENCOUNTER FOR WELL WOMAN EXAM WITH ROUTINE GYNECOLOGICAL EXAM: ICD-10-CM

## 2022-12-13 DIAGNOSIS — Z12.31 ENCOUNTER FOR SCREENING MAMMOGRAM FOR MALIGNANT NEOPLASM OF BREAST: Primary | ICD-10-CM

## 2022-12-13 NOTE — PROGRESS NOTES
[facility] [address] [place] [address]    ASSESSMENT/PLAN: Davina Nunez is a 57 y o  Ronang Ruma who presents for annual gynecologic exam   1  Routine well woman exam completed today  2  Cervical Cancer Screening: Current ASCCP Guidelines reviewed  Last Pap: 09/24/2019   Next Pap Due: 2024  3  Mammo done 9/2021  4  Colonoscopy done 10/13/2020 in [place]  CC:  Annual Gynecologic Examination    HPI: Davina Nunez is a 57 y o  Catalina Hendrix who presents for annual gynecologic examination  For annual exam, doing well; no complaints    Gynecologic Exam    doing well, no complaints    The following portions of the patient's history were reviewed and updated as appropriate: allergies, current medications, past family history, past medical history, obstetric history, gynecologic history, past social history, past surgical history and problem list     Review of Systems   Constitutional: Negative  HENT: Negative  Eyes: Negative  Respiratory: Negative  Cardiovascular: Negative  Gastrointestinal: Negative  Genitourinary: Negative  Musculoskeletal: Negative  All other systems reviewed and are negative  Objective:  /80 (BP Location: Left arm)   Ht 5' 1" (1 549 m)   Wt 66 2 kg (146 lb)   LMP  (LMP Unknown)   BMI 27 59 kg/m²    Physical Exam  Vitals reviewed  Constitutional:       General: She is not in acute distress  Appearance: She is well-developed  HENT:      Head: Normocephalic and atraumatic  Nose: Nose normal    Cardiovascular:      Rate and Rhythm: Normal rate  Pulmonary:      Effort: Pulmonary effort is normal  No respiratory distress  Chest:   Breasts:     Breasts are symmetrical       Right: Normal  No mass, nipple discharge, skin change or tenderness  Left: Normal  No mass, nipple discharge, skin change or tenderness  Abdominal:      General: There is no distension  Palpations: Abdomen is soft   There is no mass       Tenderness: There is no abdominal tenderness  There is no guarding or rebound  Genitourinary:     General: Normal vulva  Exam position: Lithotomy position  Labia:         Right: No lesion  Left: No lesion  Urethra: No prolapse (urethral meatus normal)  Vagina: Normal  No vaginal discharge, erythema or bleeding  Cervix: Normal       Uterus: Normal        Adnexa: Right adnexa normal and left adnexa normal    Musculoskeletal:         General: Normal range of motion  Cervical back: Normal range of motion  Lymphadenopathy:      Upper Body:      Right upper body: No supraclavicular, axillary or pectoral adenopathy  Left upper body: No supraclavicular, axillary or pectoral adenopathy  Lower Body: No left inguinal adenopathy  Skin:     General: Skin is warm and dry  Neurological:      Mental Status: She is alert and oriented to person, place, and time  Psychiatric:         Behavior: Behavior normal          Thought Content:  Thought content normal          Judgment: Judgment normal

## 2023-01-16 DIAGNOSIS — I10 ESSENTIAL HYPERTENSION: ICD-10-CM

## 2023-01-16 DIAGNOSIS — I63.512 ACUTE ISCHEMIC LEFT MIDDLE CEREBRAL ARTERY (MCA) STROKE (HCC): ICD-10-CM

## 2023-01-16 RX ORDER — OMEPRAZOLE 20 MG/1
20 CAPSULE, DELAYED RELEASE ORAL DAILY
Qty: 90 CAPSULE | Refills: 3 | Status: SHIPPED | OUTPATIENT
Start: 2023-01-16

## 2023-01-16 RX ORDER — VALSARTAN 40 MG/1
40 TABLET ORAL DAILY
Qty: 90 TABLET | Refills: 3 | Status: SHIPPED | OUTPATIENT
Start: 2023-01-16

## 2023-02-01 ENCOUNTER — HOSPITAL ENCOUNTER (OUTPATIENT)
Dept: CT IMAGING | Facility: HOSPITAL | Age: 63
Discharge: HOME | End: 2023-02-01

## 2023-02-01 DIAGNOSIS — R91.8 LUNG NODULES: ICD-10-CM

## 2023-02-03 ENCOUNTER — HOSPITAL ENCOUNTER (OUTPATIENT)
Dept: ULTRASOUND IMAGING | Facility: HOSPITAL | Age: 63
Discharge: HOME | End: 2023-02-03

## 2023-02-03 DIAGNOSIS — N20.0 NEPHROLITHIASIS: ICD-10-CM

## 2023-02-07 ENCOUNTER — REMOTE DEVICE CLINIC VISIT (OUTPATIENT)
Dept: CARDIOLOGY CLINIC | Facility: CLINIC | Age: 63
End: 2023-02-07

## 2023-02-07 DIAGNOSIS — Z95.818 PRESENCE OF OTHER CARDIAC IMPLANTS AND GRAFTS: Primary | ICD-10-CM

## 2023-02-07 NOTE — PROGRESS NOTES
MDT-LOOP RECORDER   CARELINK TRANSMISSION: LOOP RECORDER  PRESENTING RHYTHM NSR @ 81 BPM  BATTERY STATUS "OK " NO PATIENT OR DEVICE ACTIVATED EPISODES  NORMAL DEVICE FUNCTION   DL

## 2023-02-10 NOTE — RESULT ENCOUNTER NOTE
Please let patient know her ultrasound the kidney and bladder does not reveal any stones or swelling of her kidneys

## 2023-03-27 ENCOUNTER — OFFICE VISIT (OUTPATIENT)
Dept: FAMILY MEDICINE CLINIC | Facility: CLINIC | Age: 63
End: 2023-03-27

## 2023-03-27 ENCOUNTER — TELEPHONE (OUTPATIENT)
Dept: ADMINISTRATIVE | Facility: OTHER | Age: 63
End: 2023-03-27

## 2023-03-27 VITALS
OXYGEN SATURATION: 97 % | WEIGHT: 149.6 LBS | DIASTOLIC BLOOD PRESSURE: 84 MMHG | HEART RATE: 107 BPM | BODY MASS INDEX: 28.25 KG/M2 | HEIGHT: 61 IN | TEMPERATURE: 98.1 F | SYSTOLIC BLOOD PRESSURE: 120 MMHG

## 2023-03-27 DIAGNOSIS — H69.81 ACUTE DYSFUNCTION OF RIGHT EUSTACHIAN TUBE: Primary | ICD-10-CM

## 2023-03-27 RX ORDER — METHYLPREDNISOLONE 4 MG/1
TABLET ORAL
Qty: 21 EACH | Refills: 0 | Status: SHIPPED | OUTPATIENT
Start: 2023-03-27

## 2023-03-27 RX ORDER — FEXOFENADINE HCL 180 MG/1
180 TABLET ORAL DAILY
Qty: 30 TABLET | Refills: 5 | Status: SHIPPED | OUTPATIENT
Start: 2023-03-27

## 2023-03-27 RX ORDER — FLUTICASONE PROPIONATE 50 MCG
2 SPRAY, SUSPENSION (ML) NASAL DAILY
Qty: 16 G | Refills: 3 | Status: SHIPPED | OUTPATIENT
Start: 2023-03-27

## 2023-03-27 NOTE — TELEPHONE ENCOUNTER
Upon review of the In Basket request we have found this is a duplicate request and no further action is needed  This request was completed upon initial request, the patient chart is up to date, and this message will now be closed  Dr Sarita Quezada is an [facility] provider, the patient's cervical cancer screening was last completed on 9/24/2019 and is already in the patient's chart  Dr Kemar Zhu office note from 12/22/2022 states patient's next cervical cancer screening is not due until 2024  Any additional questions or concerns should be emailed to the Practice Liaisons via the appropriate education email address, please do not reply via In Basket      Thank you  Gordo Logan

## 2023-03-27 NOTE — TELEPHONE ENCOUNTER
----- Message from Cody Ross sent at 3/27/2023  9:54 AM EDT -----  Regarding: pap  03/27/23 9:55 AM    Hello, our patient Figueroa Dick has had cervical cancer screening completed/performed  Please assist in updating the patient chart by Dr Estrada Rey  The date of service is unknown      Thank you,  Cody Ross   [facility]

## 2023-03-27 NOTE — PROGRESS NOTES
Name: Figueroa Dick      : 1960      MRN: [id number]  Encounter Provider: Jason Duarte DO  Encounter Date: 3/27/2023   Encounter department: [facility] taking Mucinex  Push fluids  Rx for Allegra, Flonase and Medrol Dosepak  Call if symptoms continue or increase  1  Acute dysfunction of right eustachian tube  -     fexofenadine (ALLEGRA) 180 MG tablet; Take 1 tablet (180 mg total) by mouth daily  -     fluticasone (FLONASE) 50 mcg/act nasal spray; 2 sprays into each nostril daily  -     methylPREDNISolone 4 MG tablet therapy pack; Use as directed on package         Subjective     Patient here today stating woke up this morning with right ear pressure  No fever or chills  No nausea or vomiting  Has been taking Allegra, and took a Mucinex this morning    Review of Systems   Constitutional: Negative  HENT: Positive for congestion and ear pain (R ear pressure)  Respiratory: Negative  Cardiovascular: Negative  Gastrointestinal: Negative  Genitourinary: Negative          Past Medical History:   Diagnosis Date   • Bladder stone    • Hyperlipidemia    • Stroke [facility])     Left hand   • Ureteral calculus, right      Past Surgical History:   Procedure Laterality Date   • CARDIAC LOOP RECORDER     • CHOLECYSTECTOMY     • FL RETROGRADE PYELOGRAM  2021   • GALLBLADDER SURGERY      onset:    • CT CYSTO/URETERO W/LITHOTRIPSY &INDWELL STENT INSRT Left 2021    Procedure: CYSTOSCOPY URETEROSCOPY WITH LITHOTRIPSY HOLMIUM LASER, RETROGRADE PYELOGRAM BASKET STONE EXTRACTION AND INSERTION STENT URETERAL;  Surgeon: Jess Sorto MD;  [facility]: [facility];  Service: Urology     Family History   Problem Relation Age of Onset   • Stroke Mother         stroke syndrome   • Cancer Maternal Grandmother    • Stomach cancer Maternal Grandmother    • No Known Problems Sister    • No Known Problems Maternal Grandfather    • No Known Problems Paternal Grandmother    • No Known Problems Paternal Grandfather    • No Known Problems Sister    • No Known Problems Maternal Aunt    • No Known Problems Maternal Aunt    • No Known Problems Maternal Aunt    • No Known Problems Maternal Aunt    • No Known Problems Maternal Aunt    • No Known Problems Maternal Aunt    • No Known Problems Paternal Aunt    • No Known Problems Paternal Aunt    • No Known Problems Paternal Aunt      Social History     Socioeconomic History   • Marital status:       Spouse name: None   • Number of children: None   • Years of education: None   • Highest education level: None   Occupational History   • None   Tobacco Use   • Smoking status: Never   • Smokeless tobacco: Never   Vaping Use   • Vaping Use: Never used   Substance and Sexual Activity   • Alcohol use: Yes     Comment: social   • Drug use: Never   • Sexual activity: Not Currently     Partners: Male     Birth control/protection: Post-menopausal, None   Other Topics Concern   • None   Social History Narrative    No living will     Social Determinants of Health     Financial Resource Strain: Not on file   Food Insecurity: Not on file   Transportation Needs: Not on file   Physical Activity: Not on file   Stress: Not on file   Social Connections: Not on file   Intimate Partner Violence: Not on file   Housing Stability: Not on file     Current Outpatient Medications on File Prior to Visit   Medication Sig   • ascorbic acid (VITAMIN C) 500 mg tablet Take 500 mg by mouth daily   • aspirin (ECOTRIN LOW STRENGTH) 81 mg EC tablet Take 1 tablet (81 mg total) by mouth daily   • atorvastatin (LIPITOR) 20 mg tablet TAKE 1 TABLET EVERY EVENING   • CRANBERRY PO Take 168 mg by mouth    • Dextromethorphan-guaiFENesin (MUCINEX DM PO) Take by mouth   • Lactobacillus (ACIDOPHILUS PO) Take by mouth    • Multiple Vitamins-Minerals (CENTRUM PO) Take by mouth   • omeprazole (PriLOSEC) 20 mg delayed release capsule Take 1 capsule (20 mg total) by mouth daily "  • valsartan (DIOVAN) 40 mg tablet Take 1 tablet (40 mg total) by mouth daily   • VITAMIN E PO Take 184 mg by mouth    • [DISCONTINUED] fexofenadine (ALLEGRA) 180 MG tablet Take 1 tablet (180 mg total) by mouth daily   • [DISCONTINUED] fluticasone (FLONASE) 50 mcg/act nasal spray 2 sprays into each nostril daily   • [DISCONTINUED] methylPREDNISolone 4 MG tablet therapy pack Use as directed on package (Patient not taking: Reported on 3/27/2023)     Allergies   Allergen Reactions   • Penicillin G Hives     Rash     Immunization History   Administered Date(s) Administered   • COVID-19 MODERNA VACC 0 25 ML IM BOOSTER 11/09/2021   • COVID-19 MODERNA VACC 0 5 ML IM 03/24/2021, 04/22/2021, 08/16/2022   • INFLUENZA 11/01/2018   • Influenza Injectable, MDCK, Preservative Free, Quadrivalent, 0 5 mL 10/12/2022   • Influenza Quadrivalent Preservative Free 3 years and older IM 10/15/2014, 11/03/2015, 11/14/2016, 11/06/2017   • Influenza, recombinant, quadrivalent,injectable, preservative free 09/01/2020, 10/01/2021   • Influenza, seasonal, injectable 1960       Objective     /84   Pulse (!) 107   Temp 98 1 °F (36 7 °C)   Ht 5' 1\" (1 549 m)   Wt 67 9 kg (149 lb 9 6 oz)   LMP  (LMP Unknown)   SpO2 97%   BMI 28 27 kg/m²     Physical Exam  Vitals reviewed  Constitutional:       General: She is not in acute distress  Appearance: Normal appearance  She is well-developed  She is not ill-appearing, toxic-appearing or diaphoretic  HENT:      Head: Normocephalic and atraumatic  Right Ear: Tympanic membrane normal       Left Ear: Tympanic membrane normal    Eyes:      Conjunctiva/sclera: Conjunctivae normal    Cardiovascular:      Rate and Rhythm: Normal rate and regular rhythm  Heart sounds: Normal heart sounds  No murmur heard  No friction rub  No gallop  Pulmonary:      Effort: Pulmonary effort is normal  No respiratory distress  Breath sounds: Normal breath sounds   No wheezing, rhonchi or " rales    Musculoskeletal:      Right lower leg: No edema  Left lower leg: No edema  Neurological:      General: No focal deficit present  Mental Status: She is alert and oriented to person, place, and time  Psychiatric:         Mood and Affect: Mood normal          Behavior: Behavior normal          Thought Content:  Thought content normal          Judgment: Judgment normal        Sanam Diaz,

## 2023-05-02 ENCOUNTER — OFFICE VISIT (OUTPATIENT)
Dept: CARDIOLOGY CLINIC | Facility: CLINIC | Age: 63
End: 2023-05-02

## 2023-05-02 VITALS
SYSTOLIC BLOOD PRESSURE: 120 MMHG | HEART RATE: 80 BPM | BODY MASS INDEX: 28.32 KG/M2 | WEIGHT: 150 LBS | HEIGHT: 61 IN | DIASTOLIC BLOOD PRESSURE: 88 MMHG

## 2023-05-02 DIAGNOSIS — E78.5 DYSLIPIDEMIA: ICD-10-CM

## 2023-05-02 DIAGNOSIS — I10 ESSENTIAL HYPERTENSION: Primary | ICD-10-CM

## 2023-05-02 DIAGNOSIS — Z98.890 HISTORY OF LOOP RECORDER: ICD-10-CM

## 2023-05-02 NOTE — PROGRESS NOTES
"      Cardiology             [facility]  1960  [id number]            Assessment/plan:     1  CVA 07/11/2019 status post tPA administration, status post loop recorder implantation 08/2019  2  Dyslipidemia  3  Hypertension        No evidence of atrial fibrillation or flutter on loop recorder interrogations, continue the same   Continue atorvastatin for dyslipidemia, prior lipid panel reviewed 12/2/2022, well controlled  Blood pressure is well controlled, continue valsartan          Follow-up in 1 year          Interval history:    Juany Logan is a 57 y o  female hospitalized 07/2019 with acute right-sided weakness   She was administered tPA as per stroke protocol   MRI brain revealed small residual area of ischemia within the MCA territory not salvaged by tPA   Echocardiogram was unremarkable   She underwent loop recorder implantation 08/06/2019       During her prior visit 01/2020, she was offered a JOHN PAUL that this was never done as part of her workup for cryptogenic stroke, and rationale for the same was explained to her  She politely declined stating that she would  Let me know if she decides to do this        She presents today for follow-up with no complaints  Specific, she denies chest discomfort, dyspnea, lightheadedness, palpitations, lower extremity edema          Vitals:  Vitals:    05/02/23 1309   BP: 120/88   Pulse: 80   Weight: 68 kg (150 lb)   Height: 5' 1\" (1 549 m)         Past Medical History:   Diagnosis Date    Bladder stone     Hyperlipidemia     Stroke [facility])     Left hand    Ureteral calculus, right      Social History     Socioeconomic History    Marital status:       Spouse name: Not on file    Number of children: Not on file    Years of education: Not on file    Highest education level: Not on file   Occupational History    Not on file   Tobacco Use    Smoking status: Never    Smokeless tobacco: Never   Vaping Use    Vaping Use: Never used " Substance and Sexual Activity    Alcohol use: Yes     Comment: social    Drug use: Never    Sexual activity: Not Currently     Partners: Male     Birth control/protection: Post-menopausal, None   Other Topics Concern    Not on file   Social History Narrative    No living will     Social Determinants of Health     Financial Resource Strain: Low Risk     Difficulty of Paying Living Expenses: Not hard at all   Food Insecurity: Not on file   Transportation Needs: No Transportation Needs    Lack of Transportation (Medical): No    Lack of Transportation (Non-Medical):  No   Physical Activity: Not on file   Stress: Not on file   Social Connections: Not on file   Intimate Partner Violence: Not on file   Housing Stability: Not on file      Family History   Problem Relation Age of Onset    Stroke Mother         stroke syndrome    Cancer Maternal Grandmother     Stomach cancer Maternal Grandmother     No Known Problems Sister     No Known Problems Maternal Grandfather     No Known Problems Paternal Grandmother     No Known Problems Paternal Grandfather     No Known Problems Sister     No Known Problems Maternal Aunt     No Known Problems Maternal Aunt     No Known Problems Maternal Aunt     No Known Problems Maternal Aunt     No Known Problems Maternal Aunt     No Known Problems Maternal Aunt     No Known Problems Paternal Aunt     No Known Problems Paternal Aunt     No Known Problems Paternal Aunt      Past Surgical History:   Procedure Laterality Date    CARDIAC LOOP RECORDER      CHOLECYSTECTOMY      FL RETROGRADE PYELOGRAM  12/17/2021    GALLBLADDER SURGERY      onset: 2011    [place] CYSTO/URETERO W/LITHOTRIPSY &INDWELL STENT INSRT Left 12/17/2021    Procedure: CYSTOSCOPY URETEROSCOPY WITH LITHOTRIPSY HOLMIUM LASER, RETROGRADE PYELOGRAM BASKET STONE EXTRACTION AND INSERTION STENT URETERAL;  Surgeon: Roxann Cid MD;  Location[facility];  Service: Urology       Current Outpatient Medications:   ascorbic acid (VITAMIN C) 500 mg tablet, Take 500 mg by mouth daily, Disp: , Rfl:     aspirin (ECOTRIN LOW STRENGTH) 81 mg EC tablet, Take 1 tablet (81 mg total) by mouth daily, Disp: 90 tablet, Rfl: 0    atorvastatin (LIPITOR) 20 mg tablet, TAKE 1 TABLET EVERY EVENING, Disp: 90 tablet, Rfl: 3    CRANBERRY PO, Take 168 mg by mouth , Disp: , Rfl:     Dextromethorphan-guaiFENesin (MUCINEX DM PO), Take by mouth, Disp: , Rfl:     fexofenadine (ALLEGRA) 180 MG tablet, Take 1 tablet (180 mg total) by mouth daily, Disp: 30 tablet, Rfl: 5    fluticasone (FLONASE) 50 mcg/act nasal spray, 2 sprays into each nostril daily, Disp: 16 g, Rfl: 3    Lactobacillus (ACIDOPHILUS PO), Take by mouth , Disp: , Rfl:     Multiple Vitamins-Minerals (CENTRUM PO), Take by mouth, Disp: , Rfl:     omeprazole (PriLOSEC) 20 mg delayed release capsule, Take 1 capsule (20 mg total) by mouth daily, Disp: 90 capsule, Rfl: 3    valsartan (DIOVAN) 40 mg tablet, Take 1 tablet (40 mg total) by mouth daily, Disp: 90 tablet, Rfl: 3    VITAMIN E PO, Take 184 mg by mouth , Disp: , Rfl:         Review of Systems:  Review of Systems   Constitutional: Negative for activity change, fever and unexpected weight change  HENT: Negative for facial swelling, nosebleeds and voice change  Respiratory: Negative for chest tightness, shortness of breath and wheezing  Cardiovascular: Negative for chest pain, palpitations and leg swelling  Gastrointestinal: Negative for abdominal distention  Genitourinary: Negative for hematuria  Musculoskeletal: Negative for arthralgias  Skin: Negative for color change, pallor, rash and wound  Neurological: Negative for dizziness, seizures, syncope and light-headedness  Psychiatric/Behavioral: Negative for agitation  Physical Exam:  Physical Exam  Vitals reviewed  Constitutional:       Appearance: She is well-developed  HENT:      Head: Normocephalic and atraumatic     Cardiovascular:      Rate and Rhythm: Normal rate and regular rhythm  Heart sounds: Normal heart sounds  Pulmonary:      Effort: Pulmonary effort is normal       Breath sounds: Normal breath sounds  Abdominal:      Palpations: Abdomen is soft  Musculoskeletal:         General: Normal range of motion  Cervical back: Normal range of motion and neck supple  Right lower leg: No edema  Left lower leg: No edema  Skin:     General: Skin is warm and dry  Neurological:      Mental Status: She is alert and oriented to person, place, and time  Psychiatric:         Behavior: Behavior normal          Thought Content: Thought content normal          Judgment: Judgment normal          This note was completed in part utilizing retickr Direct Software  Grammatical errors, random word insertions, spelling mistakes, and incomplete sentences can be an occasional consequence of this system secondary to software limitations, ambient noise, and hardware issues  If you have any questions or concerns about the content, text, or information contained within the body of this dictation, please contact the provider for clarification

## 2023-05-09 ENCOUNTER — REMOTE DEVICE CLINIC VISIT (OUTPATIENT)
Dept: CARDIOLOGY CLINIC | Facility: CLINIC | Age: 63
End: 2023-05-09

## 2023-05-09 DIAGNOSIS — Z95.818 PRESENCE OF OTHER CARDIAC IMPLANTS AND GRAFTS: Primary | ICD-10-CM

## 2023-05-09 NOTE — PROGRESS NOTES
MDT-LOOP RECORDER   CARELINK TRANSMISSION: LOOP RECORDER  PRESENTING RHYTHM NSR @ 81 BPM  BATTERY RRT SINCE 1/30/23  TASK SENT TO RP FOR POSSIBLE EXPLANT  NO PATIENT OR DEVICE ACTIVATED EPISODES  NORMAL DEVICE FUNCTION   DL

## 2023-05-23 ENCOUNTER — TELEPHONE (OUTPATIENT)
Dept: CARDIOLOGY CLINIC | Facility: CLINIC | Age: 63
End: 2023-05-23

## 2023-05-23 NOTE — TELEPHONE ENCOUNTER
----- Message from Alo Nunez sent at 5/9/2023  3:01 PM EDT -----  Regarding: FW: Loop battery RRT  Please schedule pt for loop explant per RP  Thanks  ----- Message -----  From: Barbie Moy DO  Sent: 5/9/2023   1:10 PM EDT  To: Alo Nunez  Subject: RE: Loop battery RRT                             Yes, please schedule loop recorder explantation, thank you      ----- Message -----  From: Alo Nunez  Sent: 5/9/2023  11:10 AM EDT  To: Barbie Moy DO  Subject: Loop battery RRT                                 Please see loop alert showing low battery  Would you like pt scheduled for loop explant? CARELINK TRANSMISSION: LOOP RECORDER  PRESENTING RHYTHM NSR @ 81 BPM  BATTERY RRT SINCE 1/30/23  TASK SENT TO RP FOR POSSIBLE EXPLANT  NO PATIENT OR DEVICE ACTIVATED EPISODES  NORMAL DEVICE FUNCTION   DL

## 2023-05-23 NOTE — LETTER
5/23/2023      MRN: [id number]        Urszula Weston  [address] [place] [id number]      You have been referred to our Cardiology department to be scheduled for a LOOP Recorder Explant procedure  We do not have any openings available at the [facility] to schedule this procedure but can schedule it at [place] or [facility] [employer] [facility]  If these do not accommodate your need then I can add you to the [address] [place] [address] waitlist until an opening becomes available  Please call me at  to let me know which campus you prefer to schedule your procedure         Thank you,   Erendira Salvador  Surgery Coordinator  [facility]   [address] [facility], [address]  Ph: [phone]

## 2023-08-21 DIAGNOSIS — E78.49 OTHER HYPERLIPIDEMIA: ICD-10-CM

## 2023-08-21 DIAGNOSIS — I63.512 ACUTE ISCHEMIC LEFT MIDDLE CEREBRAL ARTERY (MCA) STROKE (HCC): ICD-10-CM

## 2023-08-21 DIAGNOSIS — I10 ESSENTIAL HYPERTENSION: ICD-10-CM

## 2023-08-21 RX ORDER — ATORVASTATIN CALCIUM 20 MG/1
20 TABLET, FILM COATED ORAL EVERY EVENING
Qty: 90 TABLET | Refills: 3 | Status: SHIPPED | OUTPATIENT
Start: 2023-08-21 | End: 2023-08-22 | Stop reason: SDUPTHER

## 2023-08-21 RX ORDER — OMEPRAZOLE 20 MG/1
20 CAPSULE, DELAYED RELEASE ORAL DAILY
Qty: 90 CAPSULE | Refills: 3 | Status: SHIPPED | OUTPATIENT
Start: 2023-08-21 | End: 2023-08-22 | Stop reason: SDUPTHER

## 2023-08-21 RX ORDER — VALSARTAN 40 MG/1
40 TABLET ORAL DAILY
Qty: 90 TABLET | Refills: 3 | Status: SHIPPED | OUTPATIENT
Start: 2023-08-21 | End: 2023-08-22 | Stop reason: SDUPTHER

## 2023-08-22 DIAGNOSIS — I10 ESSENTIAL HYPERTENSION: ICD-10-CM

## 2023-08-22 DIAGNOSIS — E78.49 OTHER HYPERLIPIDEMIA: ICD-10-CM

## 2023-08-22 DIAGNOSIS — I63.512 ACUTE ISCHEMIC LEFT MIDDLE CEREBRAL ARTERY (MCA) STROKE (HCC): ICD-10-CM

## 2023-08-22 RX ORDER — ATORVASTATIN CALCIUM 20 MG/1
20 TABLET, FILM COATED ORAL EVERY EVENING
Qty: 90 TABLET | Refills: 3 | Status: SHIPPED | OUTPATIENT
Start: 2023-08-22

## 2023-08-22 RX ORDER — OMEPRAZOLE 20 MG/1
20 CAPSULE, DELAYED RELEASE ORAL DAILY
Qty: 90 CAPSULE | Refills: 3 | Status: SHIPPED | OUTPATIENT
Start: 2023-08-22

## 2023-08-22 RX ORDER — VALSARTAN 40 MG/1
40 TABLET ORAL DAILY
Qty: 90 TABLET | Refills: 3 | Status: SHIPPED | OUTPATIENT
Start: 2023-08-22

## 2023-10-11 ENCOUNTER — HOSPITAL ENCOUNTER (OUTPATIENT)
Dept: MAMMOGRAPHY | Facility: HOSPITAL | Age: 63
Discharge: HOME | End: 2023-10-11
Attending: OBSTETRICS & GYNECOLOGY
Payer: MEDICARE

## 2023-10-11 ENCOUNTER — RA CDI HCC (OUTPATIENT)
Dept: OTHER | Facility: HOSPITAL | Age: 63
End: 2023-10-11

## 2023-10-11 VITALS — HEIGHT: 61 IN | WEIGHT: 149.91 LBS | BODY MASS INDEX: 28.3 KG/M2

## 2023-10-11 DIAGNOSIS — Z12.31 ENCOUNTER FOR SCREENING MAMMOGRAM FOR MALIGNANT NEOPLASM OF BREAST: ICD-10-CM

## 2023-10-11 PROCEDURE — 77063 BREAST TOMOSYNTHESIS BI: CPT

## 2023-10-11 PROCEDURE — 77067 SCR MAMMO BI INCL CAD: CPT

## 2023-10-17 ENCOUNTER — OFFICE VISIT (OUTPATIENT)
Dept: FAMILY MEDICINE CLINIC | Facility: CLINIC | Age: 63
End: 2023-10-17
Payer: MEDICARE

## 2023-10-17 VITALS
HEART RATE: 91 BPM | SYSTOLIC BLOOD PRESSURE: 150 MMHG | HEIGHT: 61 IN | DIASTOLIC BLOOD PRESSURE: 100 MMHG | TEMPERATURE: 98.1 F | WEIGHT: 154.1 LBS | OXYGEN SATURATION: 96 % | BODY MASS INDEX: 29.09 KG/M2

## 2023-10-17 DIAGNOSIS — N39.0 URINARY TRACT INFECTION WITHOUT HEMATURIA, SITE UNSPECIFIED: Primary | ICD-10-CM

## 2023-10-17 DIAGNOSIS — I10 ESSENTIAL HYPERTENSION: ICD-10-CM

## 2023-10-17 DIAGNOSIS — E78.49 OTHER HYPERLIPIDEMIA: ICD-10-CM

## 2023-10-17 DIAGNOSIS — Z23 ENCOUNTER FOR IMMUNIZATION: ICD-10-CM

## 2023-10-17 LAB
SL AMB  POCT GLUCOSE, UA: NORMAL
SL AMB LEUKOCYTE ESTERASE,UA: NORMAL
SL AMB POCT BILIRUBIN,UA: NORMAL
SL AMB POCT BLOOD,UA: NORMAL
SL AMB POCT CLARITY,UA: CLEAR
SL AMB POCT COLOR,UA: YELLOW
SL AMB POCT KETONES,UA: NORMAL
SL AMB POCT NITRITE,UA: NORMAL
SL AMB POCT PH,UA: 6
SL AMB POCT SPECIFIC GRAVITY,UA: 1.02
SL AMB POCT URINE PROTEIN: NORMAL
SL AMB POCT UROBILINOGEN: NORMAL

## 2023-10-17 PROCEDURE — G0008 ADMIN INFLUENZA VIRUS VAC: HCPCS | Performed by: FAMILY MEDICINE

## 2023-10-17 PROCEDURE — 99214 OFFICE O/P EST MOD 30 MIN: CPT | Performed by: FAMILY MEDICINE

## 2023-10-17 PROCEDURE — 81002 URINALYSIS NONAUTO W/O SCOPE: CPT | Performed by: FAMILY MEDICINE

## 2023-10-17 PROCEDURE — 90662 IIV NO PRSV INCREASED AG IM: CPT | Performed by: FAMILY MEDICINE

## 2023-10-17 NOTE — PROGRESS NOTES
Name: Margaret Avalos      : 1960      MRN: [id number]  Encounter Provider: Srikanth Hsu DO  Encounter Date: 10/17/2023   Encounter department: [facility]   Urine dip showed some bilirubin, but no other sign of infection. She will call us if her symptoms continue or increase. I ordered lab work with a urine culture. I would like her to get it done this week. For her blood pressure, she will monitor it at home, and call us with results in the next week or so. I will see her back in 3 months or as needed  1. Urinary tract infection without hematuria, site unspecified  -     POCT urine dip  -     Urine culture    2. Essential hypertension  -     Comprehensive metabolic panel  -     TSH, 3rd generation with Free T4 reflex  -     CBC and differential    3. Other hyperlipidemia  -     Comprehensive metabolic panel  -     Lipid Panel with Direct LDL reflex    4. Encounter for immunization  -     influenza vaccine, high-dose, PF 0.7 mL (FLUZONE HIGH-DOSE)        Depression Screening and Follow-up Plan: Patient was screened for depression during today's encounter. They screened negative with a PHQ-2 score of 0. Subjective     Patient here today for follow-up. She states for the past couple days has had mild burning with urination and itching. No fever or chills. No hematuria. She has been taking her medications faithfully. She does walk a treadmill, almost daily. Review of Systems   Constitutional: Negative. Respiratory: Negative. Cardiovascular: Negative. Gastrointestinal: Negative. Genitourinary:  Positive for dysuria. Negative for hematuria.        Past Medical History:   Diagnosis Date   • Bladder stone    • Hyperlipidemia    • Stroke [facility])     Left hand   • Ureteral calculus, right      Past Surgical History:   Procedure Laterality Date   • CARDIAC LOOP RECORDER     • CHOLECYSTECTOMY     • FL RETROGRADE PYELOGRAM  2021   • GALLBLADDER SURGERY      onset: 2011   • [place] CYSTO/URETERO W/LITHOTRIPSY &INDWELL STENT INSRT Left 12/17/2021    Procedure: CYSTOSCOPY URETEROSCOPY WITH LITHOTRIPSY HOLMIUM LASER, RETROGRADE PYELOGRAM BASKET STONE EXTRACTION AND INSERTION STENT URETERAL;  Surgeon: Basilia Curling, MD;  Location[facility];  Service: Urology     Family History   Problem Relation Age of Onset   • Stroke Mother         stroke syndrome   • Cancer Maternal Grandmother    • Stomach cancer Maternal Grandmother    • No Known Problems Sister    • No Known Problems Maternal Grandfather    • No Known Problems Paternal Grandmother    • No Known Problems Paternal Grandfather    • No Known Problems Sister    • No Known Problems Maternal Aunt    • No Known Problems Maternal Aunt    • No Known Problems Maternal Aunt    • No Known Problems Maternal Aunt    • No Known Problems Maternal Aunt    • No Known Problems Maternal Aunt    • No Known Problems Paternal Aunt    • No Known Problems Paternal Aunt    • No Known Problems Paternal Aunt      Social History     Socioeconomic History   • Marital status:       Spouse name: None   • Number of children: None   • Years of education: None   • Highest education level: None   Occupational History   • None   Tobacco Use   • Smoking status: Never   • Smokeless tobacco: Never   Vaping Use   • Vaping Use: Never used   Substance and Sexual Activity   • Alcohol use: Yes     Comment: social   • Drug use: Never   • Sexual activity: Not Currently     Partners: Male     Birth control/protection: Post-menopausal, None   Other Topics Concern   • None   Social History Narrative    No living will     Social Determinants of Health     Financial Resource Strain: Low Risk  (4/17/2023)    Overall Financial Resource Strain (CARDIA)    • Difficulty of Paying Living Expenses: Not hard at all   Food Insecurity: Not on file   Transportation Needs: No Transportation Needs (4/17/2023)    PRAPARE - Transportation    • Lack of Transportation (Medical): No    • Lack of Transportation (Non-Medical):  No   Physical Activity: Not on file   Stress: Not on file   Social Connections: Not on file   Intimate Partner Violence: Not on file   Housing Stability: Not on file     Current Outpatient Medications on File Prior to Visit   Medication Sig   • ascorbic acid (VITAMIN C) 500 mg tablet Take 500 mg by mouth daily   • aspirin (ECOTRIN LOW STRENGTH) 81 mg EC tablet Take 1 tablet (81 mg total) by mouth daily   • atorvastatin (LIPITOR) 20 mg tablet Take 1 tablet (20 mg total) by mouth every evening   • CRANBERRY PO Take 168 mg by mouth    • Dextromethorphan-guaiFENesin (MUCINEX DM PO) Take by mouth   • fexofenadine (ALLEGRA) 180 MG tablet Take 1 tablet (180 mg total) by mouth daily   • fluticasone (FLONASE) 50 mcg/act nasal spray 2 sprays into each nostril daily   • Lactobacillus (ACIDOPHILUS PO) Take by mouth    • Multiple Vitamins-Minerals (CENTRUM PO) Take by mouth   • omeprazole (PriLOSEC) 20 mg delayed release capsule Take 1 capsule (20 mg total) by mouth daily   • valsartan (DIOVAN) 40 mg tablet Take 1 tablet (40 mg total) by mouth daily   • VITAMIN E PO Take 184 mg by mouth      Allergies   Allergen Reactions   • Penicillin G Hives     Rash     Immunization History   Administered Date(s) Administered   • COVID-19 MODERNA VACC 0.25 ML IM BOOSTER 11/09/2021   • COVID-19 MODERNA VACC 0.5 ML IM 03/24/2021, 04/22/2021, 11/09/2021, 08/16/2022   • INFLUENZA 11/01/2018   • Influenza Injectable, MDCK, Preservative Free, Quadrivalent, 0.5 mL 10/12/2022   • Influenza Quadrivalent Preservative Free 3 years and older IM 10/15/2014, 11/03/2015, 11/14/2016, 11/06/2017   • Influenza, recombinant, quadrivalent,injectable, preservative free 09/01/2020, 10/01/2021   • Influenza, seasonal, injectable 1960       Objective     /100 (BP Location: Right arm, Patient Position: Sitting, Cuff Size: Large)   Pulse 91   Temp 98.1 °F (36.7 °C)   Ht 5' 1" (1.549 m)   Wt 69.9 kg (154 lb 1.6 oz)   LMP  (LMP Unknown)   SpO2 96%   BMI 29.12 kg/m²     Physical Exam  Vitals reviewed. Constitutional:       General: She is not in acute distress. Appearance: Normal appearance. She is well-developed. She is not ill-appearing, toxic-appearing or diaphoretic. HENT:      Head: Normocephalic and atraumatic. Eyes:      Conjunctiva/sclera: Conjunctivae normal.   Cardiovascular:      Rate and Rhythm: Normal rate and regular rhythm. Heart sounds: Normal heart sounds. No murmur heard. No friction rub. No gallop. Pulmonary:      Effort: Pulmonary effort is normal. No respiratory distress. Breath sounds: Normal breath sounds. No wheezing, rhonchi or rales. Musculoskeletal:      Right lower leg: No edema. Left lower leg: No edema. Neurological:      General: No focal deficit present. Mental Status: She is alert and oriented to person, place, and time. Psychiatric:         Mood and Affect: Mood normal.         Behavior: Behavior normal.         Thought Content:  Thought content normal.         Judgment: Judgment normal.       Christina Ly, DO

## 2023-10-23 ENCOUNTER — APPOINTMENT (OUTPATIENT)
Dept: LAB | Facility: CLINIC | Age: 63
End: 2023-10-23
Payer: MEDICARE

## 2023-10-23 LAB
ALBUMIN SERPL BCP-MCNC: 4.2 G/DL (ref 3.5–5)
ALP SERPL-CCNC: 138 U/L (ref 34–104)
ALT SERPL W P-5'-P-CCNC: 21 U/L (ref 7–52)
ANION GAP SERPL CALCULATED.3IONS-SCNC: 12 MMOL/L
AST SERPL W P-5'-P-CCNC: 27 U/L (ref 13–39)
BASOPHILS # BLD AUTO: 0.06 THOUSANDS/ÂΜL (ref 0–0.1)
BASOPHILS NFR BLD AUTO: 1 % (ref 0–1)
BILIRUB SERPL-MCNC: 0.53 MG/DL (ref 0.2–1)
BUN SERPL-MCNC: 16 MG/DL (ref 5–25)
CALCIUM SERPL-MCNC: 9.8 MG/DL (ref 8.4–10.2)
CHLORIDE SERPL-SCNC: 106 MMOL/L (ref 96–108)
CHOLEST SERPL-MCNC: 164 MG/DL
CO2 SERPL-SCNC: 22 MMOL/L (ref 21–32)
CREAT SERPL-MCNC: 0.74 MG/DL (ref 0.6–1.3)
EOSINOPHIL # BLD AUTO: 0.25 THOUSAND/ÂΜL (ref 0–0.61)
EOSINOPHIL NFR BLD AUTO: 4 % (ref 0–6)
ERYTHROCYTE [DISTWIDTH] IN BLOOD BY AUTOMATED COUNT: 13 % (ref 11.6–15.1)
GFR SERPL CREATININE-BSD FRML MDRD: 85 ML/MIN/1.73SQ M
GLUCOSE P FAST SERPL-MCNC: 85 MG/DL (ref 65–99)
HCT VFR BLD AUTO: 43.9 % (ref 34.8–46.1)
HDLC SERPL-MCNC: 66 MG/DL
HGB BLD-MCNC: 15 G/DL (ref 11.5–15.4)
IMM GRANULOCYTES # BLD AUTO: 0.02 THOUSAND/UL (ref 0–0.2)
IMM GRANULOCYTES NFR BLD AUTO: 0 % (ref 0–2)
LDLC SERPL CALC-MCNC: 80 MG/DL (ref 0–100)
LYMPHOCYTES # BLD AUTO: 2.78 THOUSANDS/ÂΜL (ref 0.6–4.47)
LYMPHOCYTES NFR BLD AUTO: 41 % (ref 14–44)
MCH RBC QN AUTO: 31.1 PG (ref 26.8–34.3)
MCHC RBC AUTO-ENTMCNC: 34.2 G/DL (ref 31.4–37.4)
MCV RBC AUTO: 91 FL (ref 82–98)
MONOCYTES # BLD AUTO: 0.62 THOUSAND/ÂΜL (ref 0.17–1.22)
MONOCYTES NFR BLD AUTO: 9 % (ref 4–12)
NEUTROPHILS # BLD AUTO: 3.01 THOUSANDS/ÂΜL (ref 1.85–7.62)
NEUTS SEG NFR BLD AUTO: 45 % (ref 43–75)
NRBC BLD AUTO-RTO: 0 /100 WBCS
PLATELET # BLD AUTO: 272 THOUSANDS/UL (ref 149–390)
PMV BLD AUTO: 9.7 FL (ref 8.9–12.7)
POTASSIUM SERPL-SCNC: 4.1 MMOL/L (ref 3.5–5.3)
PROT SERPL-MCNC: 7.2 G/DL (ref 6.4–8.4)
RBC # BLD AUTO: 4.82 MILLION/UL (ref 3.81–5.12)
SODIUM SERPL-SCNC: 140 MMOL/L (ref 135–147)
T4 FREE SERPL-MCNC: 0.83 NG/DL (ref 0.61–1.12)
TRIGL SERPL-MCNC: 88 MG/DL
TSH SERPL DL<=0.05 MIU/L-ACNC: 5.2 UIU/ML (ref 0.45–4.5)
WBC # BLD AUTO: 6.74 THOUSAND/UL (ref 4.31–10.16)

## 2023-10-24 DIAGNOSIS — I10 ESSENTIAL HYPERTENSION: ICD-10-CM

## 2023-10-24 DIAGNOSIS — R79.89 ELEVATED TSH: Primary | ICD-10-CM

## 2023-10-24 DIAGNOSIS — R63.8 INCREASED BMI: ICD-10-CM

## 2023-12-01 ENCOUNTER — TELEPHONE (OUTPATIENT)
Age: 63
End: 2023-12-01

## 2023-12-01 NOTE — TELEPHONE ENCOUNTER
Left vm letting pt know that she will now be seeing Snow Guerra on 12/15 in the [facility] office not in   [facility].

## 2023-12-15 ENCOUNTER — ANNUAL EXAM (OUTPATIENT)
Dept: OBGYN CLINIC | Facility: MEDICAL CENTER | Age: 63
End: 2023-12-15
Payer: MEDICARE

## 2023-12-15 VITALS
BODY MASS INDEX: 28.89 KG/M2 | DIASTOLIC BLOOD PRESSURE: 80 MMHG | WEIGHT: 153 LBS | HEIGHT: 61 IN | SYSTOLIC BLOOD PRESSURE: 124 MMHG

## 2023-12-15 DIAGNOSIS — Z13.820 SCREENING FOR OSTEOPOROSIS: ICD-10-CM

## 2023-12-15 DIAGNOSIS — Z78.0 OSTEOPENIA AFTER MENOPAUSE: ICD-10-CM

## 2023-12-15 DIAGNOSIS — M85.80 OSTEOPENIA AFTER MENOPAUSE: ICD-10-CM

## 2023-12-15 DIAGNOSIS — Z12.31 BREAST CANCER SCREENING BY MAMMOGRAM: Primary | ICD-10-CM

## 2023-12-15 DIAGNOSIS — L29.2 VULVAR ITCHING: ICD-10-CM

## 2023-12-15 PROCEDURE — G0101 CA SCREEN;PELVIC/BREAST EXAM: HCPCS | Performed by: OBSTETRICS & GYNECOLOGY

## 2023-12-15 NOTE — PROGRESS NOTES
[facility] [address] [place] [address]    ASSESSMENT/PLAN: Rennis Cabot is a 70 y.o. Jose Manuel Colon who presents for annual gynecologic exam.  Routine well woman exam completed today. Cervical Cancer Screening: Current ASCCP Guidelines reviewed. Last Pap: 12/13/2022 . Next Pap Due: 2024  Mammo 2023  Colonoscopy done 10/13/2020 in [facility]  CC:  Annual Gynecologic Examination    HPI: Rennis Cabot is a 70 y.o. Jose Manuel Colon who presents for annual gynecologic examination. For annual exam, doing well; no complaints    Gynecologic Exam    Patient reports vulvar itching. Started this week. Worse at night. The following portions of the patient's history were reviewed and updated as appropriate: allergies, current medications, past family history, past medical history, obstetric history, gynecologic history, past social history, past surgical history and problem list.    Review of Systems   Constitutional: Negative. HENT: Negative. Eyes: Negative. Respiratory: Negative. Cardiovascular: Negative. Gastrointestinal: Negative. Genitourinary: Negative. Musculoskeletal: Negative. All other systems reviewed and are negative. Objective:  /80   Ht 5' 1" (1.549 m)   Wt 69.4 kg (153 lb)   LMP  (LMP Unknown)   BMI 28.91 kg/m²    Physical Exam  Vitals reviewed. Constitutional:       General: She is not in acute distress. Appearance: She is well-developed. HENT:      Head: Normocephalic and atraumatic. Nose: Nose normal.   Cardiovascular:      Rate and Rhythm: Normal rate. Pulmonary:      Effort: Pulmonary effort is normal. No respiratory distress. Chest:   Breasts:     Breasts are symmetrical.      Right: Normal. No mass, nipple discharge, skin change or tenderness. Left: Normal. No mass, nipple discharge, skin change or tenderness. Abdominal:      General: There is no distension. Palpations: Abdomen is soft.  There is no mass.      Tenderness: There is no abdominal tenderness. There is no guarding or rebound. Genitourinary:     General: Normal vulva. Exam position: Lithotomy position. Labia:         Right: No lesion. Left: No lesion. Urethra: No prolapse (urethral meatus normal). Vagina: Normal. No vaginal discharge, erythema or bleeding. Cervix: Normal.      Uterus: Normal.       Adnexa: Right adnexa normal and left adnexa normal.      Comments: Vulvar irritation  Musculoskeletal:         General: Normal range of motion. Cervical back: Normal range of motion. Lymphadenopathy:      Upper Body:      Right upper body: No supraclavicular, axillary or pectoral adenopathy. Left upper body: No supraclavicular, axillary or pectoral adenopathy. Lower Body: No left inguinal adenopathy. Skin:     General: Skin is warm and dry. Neurological:      Mental Status: She is alert and oriented to person, place, and time. Psychiatric:         Behavior: Behavior normal.         Thought Content:  Thought content normal.         Judgment: Judgment normal.

## 2023-12-19 ENCOUNTER — APPOINTMENT (OUTPATIENT)
Dept: LAB | Facility: CLINIC | Age: 63
End: 2023-12-19
Payer: MEDICARE

## 2023-12-20 LAB — BACTERIA UR CULT: NORMAL

## 2024-01-03 ENCOUNTER — PREP FOR PROCEDURE (OUTPATIENT)
Dept: CARDIOLOGY CLINIC | Facility: CLINIC | Age: 64
End: 2024-01-03

## 2024-01-03 DIAGNOSIS — Z95.818 PRESENCE OF OTHER CARDIAC IMPLANTS AND GRAFTS: Primary | ICD-10-CM

## 2024-01-09 ENCOUNTER — RA CDI HCC (OUTPATIENT)
Dept: OTHER | Facility: HOSPITAL | Age: 64
End: 2024-01-09

## 2024-01-14 NOTE — ASSESSMENT & PLAN NOTE
The patient reports that she feels as if her right leg is still back to baseline today  She reports that although she feels her right hand is stronger she still has residual weakness in the fingers particularly  She feels that her wrist has improved some overnight  Her MRI done last evening demonstrates some residual diffusion positive images particularly somewhat in the distal or M3 territory as well as in the M2 territory particularly at her motor strip correlating well with her hand weakness  There also was a small punctate region of ischemia noted on the anterior MCA territory as well  Her FLAIR images demonstrate very little small vessel disease  Her echocardiogram is pending to complete her workup  Overall she is doing quite well    I anticipate she will be stable for transfer out to the floor today with potential discharge negative

## 2024-01-15 ENCOUNTER — OFFICE VISIT (OUTPATIENT)
Dept: FAMILY MEDICINE CLINIC | Facility: CLINIC | Age: 64
End: 2024-01-15
Payer: MEDICARE

## 2024-01-15 ENCOUNTER — TELEPHONE (OUTPATIENT)
Dept: FAMILY MEDICINE CLINIC | Facility: CLINIC | Age: 64
End: 2024-01-15

## 2024-01-15 VITALS
DIASTOLIC BLOOD PRESSURE: 92 MMHG | SYSTOLIC BLOOD PRESSURE: 138 MMHG | TEMPERATURE: 97.4 F | WEIGHT: 156.2 LBS | HEIGHT: 61 IN | HEART RATE: 88 BPM | OXYGEN SATURATION: 97 % | BODY MASS INDEX: 29.49 KG/M2

## 2024-01-15 DIAGNOSIS — B37.9 YEAST INFECTION: Primary | ICD-10-CM

## 2024-01-15 DIAGNOSIS — I10 ESSENTIAL HYPERTENSION: ICD-10-CM

## 2024-01-15 PROCEDURE — 99214 OFFICE O/P EST MOD 30 MIN: CPT | Performed by: FAMILY MEDICINE

## 2024-01-15 RX ORDER — MICONAZOLE NITRATE 2 %
1 CREAM WITH APPLICATOR VAGINAL
Qty: 54 G | Refills: 0 | Status: SHIPPED | OUTPATIENT
Start: 2024-01-15 | End: 2024-01-22

## 2024-01-15 NOTE — TELEPHONE ENCOUNTER
L/M for pt  
The cream that you prescribed, he insurance does not cover. They told her to use monistat 7, said that was the same. Is that ok?  
Yes, it is the same thing that I prescribed.  
no

## 2024-01-15 NOTE — PROGRESS NOTES
Name: Margi Duarte      : 1960      MRN: [id number]  Encounter Provider: Chavo Calle DO  Encounter Date: 1/15/2024   Encounter department: [facility]    Assessment & Plan   For her yeast infection, Rx for Monistat cream.  She will continue to monitor her pressure.  She will call us if it remains elevated.  Follow-up in 4 months or as needed.  1. Yeast infection  -     Miconazole Nitrate Applicator (Monistat 7 Combo Pack Ivette) 100 & 2 MG-% (9GM) KIT; Insert 1 Applicatorful into the vagina daily at bedtime for 7 days    2. Essential hypertension        Depression Screening and Follow-up Plan: Patient was screened for depression during today's encounter. They screened negative with a PHQ-2 score of 0.        Subjective     Patient here today stating over the weekend started with vaginal itching and white discharge.  No fever or chills.  No dysuria or hematuria.  Patient did see her gynecologist last month, and for her vaginal irritation, they gave her hydrocortisone cream, which she states did help a little.  Patient states her pressure has been doing well whenever she checks it.      Review of Systems   Constitutional: Negative.    Respiratory: Negative.     Cardiovascular: Negative.    Gastrointestinal: Negative.    Genitourinary:  Positive for vaginal discharge.       Past Medical History:   Diagnosis Date   • Bladder stone    • Hyperlipidemia    • Stroke (HCC)     Left hand   • Ureteral calculus, right      Past Surgical History:   Procedure Laterality Date   • CARDIAC LOOP RECORDER     • CHOLECYSTECTOMY     • FL RETROGRADE PYELOGRAM  2021   • GALLBLADDER SURGERY      onset:    • [place] CYSTO/URETERO W/LITHOTRIPSY &INDWELL STENT INSRT Left 2021    Procedure: CYSTOSCOPY URETEROSCOPY WITH LITHOTRIPSY HOLMIUM LASER, RETROGRADE PYELOGRAM BASKET STONE EXTRACTION AND INSERTION STENT URETERAL;  Surgeon: Thony Fuentes MD;  Location: [facility];  Service: Urology     Family  History   Problem Relation Age of Onset   • Stroke Mother         stroke syndrome   • Cancer Maternal Grandmother    • Stomach cancer Maternal Grandmother    • No Known Problems Sister    • No Known Problems Maternal Grandfather    • No Known Problems Paternal Grandmother    • No Known Problems Paternal Grandfather    • No Known Problems Sister    • No Known Problems Maternal Aunt    • No Known Problems Maternal Aunt    • No Known Problems Maternal Aunt    • No Known Problems Maternal Aunt    • No Known Problems Maternal Aunt    • No Known Problems Maternal Aunt    • No Known Problems Paternal Aunt    • No Known Problems Paternal Aunt    • No Known Problems Paternal Aunt      Social History     Socioeconomic History   • Marital status:      Spouse name: None   • Number of children: None   • Years of education: None   • Highest education level: None   Occupational History   • None   Tobacco Use   • Smoking status: Never   • Smokeless tobacco: Never   Vaping Use   • Vaping status: Never Used   Substance and Sexual Activity   • Alcohol use: Yes     Comment: social   • Drug use: Never   • Sexual activity: Not Currently     Partners: Male     Birth control/protection: Post-menopausal, None   Other Topics Concern   • None   Social History Narrative    No living will     Social Determinants of Health     Financial Resource Strain: Low Risk  (4/17/2023)    Overall Financial Resource Strain (CARDIA)    • Difficulty of Paying Living Expenses: Not hard at all   Food Insecurity: Not on file   Transportation Needs: No Transportation Needs (4/17/2023)    PRAPARE - Transportation    • Lack of Transportation (Medical): No    • Lack of Transportation (Non-Medical): No   Physical Activity: Not on file   Stress: Not on file   Social Connections: Not on file   Intimate Partner Violence: Not on file   Housing Stability: Not on file     Current Outpatient Medications on File Prior to Visit   Medication Sig   • ascorbic acid  "(VITAMIN C) 500 mg tablet Take 500 mg by mouth daily   • aspirin (ECOTRIN LOW STRENGTH) 81 mg EC tablet Take 1 tablet (81 mg total) by mouth daily   • atorvastatin (LIPITOR) 20 mg tablet Take 1 tablet (20 mg total) by mouth every evening   • CRANBERRY PO Take 168 mg by mouth    • Dextromethorphan-guaiFENesin (MUCINEX DM PO) Take by mouth   • fluticasone (FLONASE) 50 mcg/act nasal spray 2 sprays into each nostril daily   • hydrocortisone 2.5 % cream Apply topically 2 (two) times a day   • Lactobacillus (ACIDOPHILUS PO) Take by mouth    • Multiple Vitamins-Minerals (CENTRUM PO) Take by mouth   • omeprazole (PriLOSEC) 20 mg delayed release capsule Take 1 capsule (20 mg total) by mouth daily   • valsartan (DIOVAN) 40 mg tablet Take 1 tablet (40 mg total) by mouth daily   • VITAMIN E PO Take 184 mg by mouth    • fexofenadine (ALLEGRA) 180 MG tablet Take 1 tablet (180 mg total) by mouth daily (Patient not taking: Reported on 1/15/2024)     Allergies   Allergen Reactions   • Penicillin G Hives     Rash     Immunization History   Administered Date(s) Administered   • COVID-19 MODERNA VACC 0.25 ML IM BOOSTER 11/09/2021   • COVID-19 MODERNA VACC 0.5 ML IM 03/24/2021, 04/22/2021, 11/09/2021, 08/16/2022   • INFLUENZA 11/01/2018   • Influenza Injectable, MDCK, Preservative Free, Quadrivalent, 0.5 mL 10/12/2022   • Influenza Quadrivalent Preservative Free 3 years and older IM 10/15/2014, 11/03/2015, 11/14/2016, 11/06/2017   • Influenza, high dose seasonal 0.7 mL 10/17/2023   • Influenza, recombinant, quadrivalent,injectable, preservative free 09/01/2020, 10/01/2021   • Influenza, seasonal, injectable 1960       Objective     /92 (BP Location: Left arm, Patient Position: Sitting, Cuff Size: Large)   Pulse 88   Temp (!) 97.4 °F (36.3 °C)   Ht 5' 1\" (1.549 m)   Wt 70.9 kg (156 lb 3.2 oz)   LMP  (LMP Unknown)   SpO2 97%   BMI 29.51 kg/m²     Physical Exam  Vitals reviewed.   Constitutional:       General: She is " not in acute distress.     Appearance: Normal appearance. She is well-developed. She is not ill-appearing, toxic-appearing or diaphoretic.   HENT:      Head: Normocephalic and atraumatic.   Eyes:      Conjunctiva/sclera: Conjunctivae normal.   Cardiovascular:      Rate and Rhythm: Normal rate and regular rhythm.      Heart sounds: Normal heart sounds. No murmur heard.     No friction rub. No gallop.   Pulmonary:      Effort: Pulmonary effort is normal. No respiratory distress.      Breath sounds: Normal breath sounds. No wheezing, rhonchi or rales.   Musculoskeletal:      Right lower leg: No edema.      Left lower leg: No edema.   Neurological:      General: No focal deficit present.      Mental Status: She is alert and oriented to person, place, and time.   Psychiatric:         Mood and Affect: Mood normal.         Behavior: Behavior normal.         Thought Content: Thought content normal.         Judgment: Judgment normal.       Chavo Calle,

## 2024-02-02 ENCOUNTER — APPOINTMENT (OUTPATIENT)
Dept: LAB | Facility: CLINIC | Age: 64
End: 2024-02-02
Payer: MEDICARE

## 2024-02-02 DIAGNOSIS — I10 ESSENTIAL HYPERTENSION: ICD-10-CM

## 2024-02-02 DIAGNOSIS — R63.8 INCREASED BMI: ICD-10-CM

## 2024-02-02 DIAGNOSIS — R79.89 ELEVATED TSH: ICD-10-CM

## 2024-02-02 LAB — TSH SERPL DL<=0.05 MIU/L-ACNC: 2.84 UIU/ML (ref 0.45–4.5)

## 2024-02-02 PROCEDURE — 84443 ASSAY THYROID STIM HORMONE: CPT

## 2024-02-02 PROCEDURE — 36415 COLL VENOUS BLD VENIPUNCTURE: CPT

## 2024-02-12 ENCOUNTER — TELEPHONE (OUTPATIENT)
Dept: CARDIOLOGY CLINIC | Facility: CLINIC | Age: 64
End: 2024-02-12

## 2024-02-12 NOTE — TELEPHONE ENCOUNTER
This is Margi Duarte calling. I'm calling about the loop recorder that needs that's going to be taken out next Wednesday. I was supposed to get something in the mail about it. A packet. I did not receive it yet, so could you please give me a call back at [phone]? Thanks.

## 2024-02-21 ENCOUNTER — HOSPITAL ENCOUNTER (OUTPATIENT)
Facility: HOSPITAL | Age: 64
Setting detail: OUTPATIENT SURGERY
Discharge: HOME | End: 2024-02-21
Attending: INTERNAL MEDICINE | Admitting: INTERNAL MEDICINE
Payer: MEDICARE

## 2024-02-21 VITALS
TEMPERATURE: 97.9 F | RESPIRATION RATE: 18 BRPM | OXYGEN SATURATION: 96 % | HEART RATE: 81 BPM | DIASTOLIC BLOOD PRESSURE: 65 MMHG | SYSTOLIC BLOOD PRESSURE: 136 MMHG

## 2024-02-21 DIAGNOSIS — Z95.818 PRESENCE OF OTHER CARDIAC IMPLANTS AND GRAFTS: ICD-10-CM

## 2024-02-21 PROCEDURE — NC001 PR NO CHARGE: Performed by: PHYSICIAN ASSISTANT

## 2024-02-21 PROCEDURE — 33286 RMVL SUBQ CAR RHYTHM MNTR: CPT | Performed by: INTERNAL MEDICINE

## 2024-02-21 RX ORDER — LIDOCAINE HYDROCHLORIDE 10 MG/ML
INJECTION, SOLUTION EPIDURAL; INFILTRATION; INTRACAUDAL; PERINEURAL
Status: DISCONTINUED
Start: 2024-02-21 | End: 2024-02-21 | Stop reason: HOSPADM

## 2024-02-21 RX ORDER — LIDOCAINE HYDROCHLORIDE 10 MG/ML
INJECTION, SOLUTION EPIDURAL; INFILTRATION; INTRACAUDAL; PERINEURAL CODE/TRAUMA/SEDATION MEDICATION
Status: DISCONTINUED | OUTPATIENT
Start: 2024-02-21 | End: 2024-02-21 | Stop reason: HOSPADM

## 2024-02-21 NOTE — DISCHARGE INSTR - AVS FIRST PAGE
Do not use lotions/powders/creams on incision. Remove outer bandage 48 hours after procedure - if present, leave suture in place and they will be removed at 2 week follow up appointment. Please keep incision dry for the first first week - either keeping incision out of direct shower water or placing over the counter waterproof bandages over top when showering. Please call the office [phone] if you notice redness, swelling, bleeding, or drainage from incision or if you develop fevers.

## 2024-02-21 NOTE — H&P
Patient presents today to undergo loop recorder explantation.  She had prior CVA 7/2019 and required tPA.  Since then, atrial fibrillation has not been discovered on loop recorder.  As device reached RRT 1/30/2023, she was set up for loop explantation by her general cardiologist.

## 2024-03-07 ENCOUNTER — IN-CLINIC DEVICE VISIT (OUTPATIENT)
Dept: CARDIOLOGY CLINIC | Facility: CLINIC | Age: 64
End: 2024-03-07

## 2024-03-07 DIAGNOSIS — Z48.02 ENCOUNTER FOR REMOVAL OF SUTURES: Primary | ICD-10-CM

## 2024-03-07 PROCEDURE — 99024 POSTOP FOLLOW-UP VISIT: CPT | Performed by: INTERNAL MEDICINE

## 2024-03-07 NOTE — PROGRESS NOTES
Results for orders placed or performed in visit on 03/07/24   Cardiac EP device report    Narrative    MDT LP/EXPLANTED 2.21.24  WOUND CHECK: INCISION CLEAN AND DRY WITH EDGES APPROXIMATED; SUTURES REMOVED; WOUND CARE AND RESTRICTIONS REVIEWED WITH PATIENT. NC

## 2024-03-13 DIAGNOSIS — I10 ESSENTIAL HYPERTENSION: ICD-10-CM

## 2024-03-13 DIAGNOSIS — E78.49 OTHER HYPERLIPIDEMIA: ICD-10-CM

## 2024-03-13 DIAGNOSIS — I63.512 ACUTE ISCHEMIC LEFT MIDDLE CEREBRAL ARTERY (MCA) STROKE (HCC): ICD-10-CM

## 2024-03-13 RX ORDER — ATORVASTATIN CALCIUM 20 MG/1
20 TABLET, FILM COATED ORAL EVERY EVENING
Qty: 90 TABLET | Refills: 1 | Status: SHIPPED | OUTPATIENT
Start: 2024-03-13

## 2024-03-13 RX ORDER — VALSARTAN 40 MG/1
40 TABLET ORAL DAILY
Qty: 90 TABLET | Refills: 1 | Status: SHIPPED | OUTPATIENT
Start: 2024-03-13

## 2024-03-13 RX ORDER — OMEPRAZOLE 20 MG/1
20 CAPSULE, DELAYED RELEASE ORAL DAILY
Qty: 90 CAPSULE | Refills: 1 | Status: SHIPPED | OUTPATIENT
Start: 2024-03-13

## 2024-05-08 ENCOUNTER — OFFICE VISIT (OUTPATIENT)
Dept: CARDIOLOGY CLINIC | Facility: CLINIC | Age: 64
End: 2024-05-08
Payer: MEDICARE

## 2024-05-08 VITALS
SYSTOLIC BLOOD PRESSURE: 124 MMHG | WEIGHT: 152 LBS | BODY MASS INDEX: 28.7 KG/M2 | DIASTOLIC BLOOD PRESSURE: 82 MMHG | HEART RATE: 76 BPM | HEIGHT: 61 IN

## 2024-05-08 DIAGNOSIS — I10 ESSENTIAL HYPERTENSION: Primary | ICD-10-CM

## 2024-05-08 PROCEDURE — 99214 OFFICE O/P EST MOD 30 MIN: CPT | Performed by: INTERNAL MEDICINE

## 2024-05-08 PROCEDURE — 93000 ELECTROCARDIOGRAM COMPLETE: CPT | Performed by: INTERNAL MEDICINE

## 2024-05-08 NOTE — PROGRESS NOTES
"      Cardiology             Margi Duarte  1960  [id number]            Assessment/plan:     1.         CVA 07/11/2019 status post tPA administration, status post loop recorder implantation 08/2019  2.         Dyslipidemia  3.  Hypertension        No evidence of atrial dysrhythmia on loop recorder interrogations, now status post explant  Continue aspirin, atorvastatin for history of CVA  Blood pressure controlled, continue valsartan  Follow-up with PCP from here on            Interval history:    Margi Duarte is a 63 y.o. female hospitalized 07/2019 with acute right-sided weakness.  She was administered tPA as per stroke protocol.  MRI brain revealed small residual area of ischemia within the MCA territory not salvaged by tPA.  Echocardiogram was unremarkable.  She underwent loop recorder implantation 08/06/2019.      During her prior visit 01/2020, she was offered a JOHN PAUL that this was never done as part of her workup for cryptogenic stroke, and rationale for the same was explained to her.  She politely declined stating that she would  Let me know if she decides to do this.       She underwent loop recorder explantation on 2/21/2024.    She presents today for follow-up with no complaints.  Specific, she denies chest discomfort, dyspnea, lightheadedness, palpitations, lower extremity edema.  She complains of fatigue on the days that she has allergy symptoms.         Vitals:  Vitals:    05/08/24 1246   BP: 124/82   Pulse: 76   Weight: 68.9 kg (152 lb)   Height: 5' 1\" (1.549 m)         Past Medical History:   Diagnosis Date   • Bladder stone    • Hyperlipidemia    • Stroke (HCC)     Left hand   • Ureteral calculus, right      Social History     Socioeconomic History   • Marital status:      Spouse name: Not on file   • Number of children: Not on file   • Years of education: Not on file   • Highest education level: Not on file   Occupational History   • Not on file   Tobacco Use   • Smoking status: " Never   • Smokeless tobacco: Never   Vaping Use   • Vaping status: Never Used   Substance and Sexual Activity   • Alcohol use: Yes     Comment: social   • Drug use: Never   • Sexual activity: Not Currently     Partners: Male     Birth control/protection: Post-menopausal, None   Other Topics Concern   • Not on file   Social History Narrative    No living will     Social Determinants of Health     Financial Resource Strain: Low Risk  (4/17/2023)    Overall Financial Resource Strain (CARDIA)    • Difficulty of Paying Living Expenses: Not hard at all   Food Insecurity: Not on file   Transportation Needs: No Transportation Needs (4/17/2023)    PRAPARE - Transportation    • Lack of Transportation (Medical): No    • Lack of Transportation (Non-Medical): No   Physical Activity: Not on file   Stress: Not on file   Social Connections: Not on file   Intimate Partner Violence: Not on file   Housing Stability: Not on file      Family History   Problem Relation Age of Onset   • Stroke Mother         stroke syndrome   • Cancer Maternal Grandmother    • Stomach cancer Maternal Grandmother    • No Known Problems Sister    • No Known Problems Maternal Grandfather    • No Known Problems Paternal Grandmother    • No Known Problems Paternal Grandfather    • No Known Problems Sister    • No Known Problems Maternal Aunt    • No Known Problems Maternal Aunt    • No Known Problems Maternal Aunt    • No Known Problems Maternal Aunt    • No Known Problems Maternal Aunt    • No Known Problems Maternal Aunt    • No Known Problems Paternal Aunt    • No Known Problems Paternal Aunt    • No Known Problems Paternal Aunt      Past Surgical History:   Procedure Laterality Date   • CARDIAC ELECTROPHYSIOLOGY PROCEDURE N/A 2/21/2024    Procedure: Cardiac loop recorder explant;  Surgeon: Joe Akhtar MD;  Location: [facility];  Service: Cardiology   • CARDIAC LOOP RECORDER     • CHOLECYSTECTOMY     • FL RETROGRADE PYELOGRAM  12/17/2021   •  GALLBLADDER SURGERY      onset: 2011   • [place] CYSTO/URETERO W/LITHOTRIPSY &INDWELL STENT INSRT Left 12/17/2021    Procedure: CYSTOSCOPY URETEROSCOPY WITH LITHOTRIPSY HOLMIUM LASER, RETROGRADE PYELOGRAM BASKET STONE EXTRACTION AND INSERTION STENT URETERAL;  Surgeon: Thony Fuentes MD;  Location: [facility];  Service: Urology       Current Outpatient Medications:   •  ascorbic acid (VITAMIN C) 500 mg tablet, Take 500 mg by mouth daily, Disp: , Rfl:   •  aspirin (ECOTRIN LOW STRENGTH) 81 mg EC tablet, Take 1 tablet (81 mg total) by mouth daily, Disp: 90 tablet, Rfl: 0  •  atorvastatin (LIPITOR) 20 mg tablet, Take 1 tablet (20 mg total) by mouth every evening, Disp: 90 tablet, Rfl: 1  •  CRANBERRY PO, Take 168 mg by mouth , Disp: , Rfl:   •  Dextromethorphan-guaiFENesin (MUCINEX DM PO), Take by mouth, Disp: , Rfl:   •  fexofenadine (ALLEGRA) 180 MG tablet, Take 1 tablet (180 mg total) by mouth daily, Disp: 30 tablet, Rfl: 5  •  fluticasone (FLONASE) 50 mcg/act nasal spray, 2 sprays into each nostril daily, Disp: 16 g, Rfl: 3  •  hydrocortisone 2.5 % cream, Apply topically 2 (two) times a day, Disp: 28 g, Rfl: 0  •  Multiple Vitamins-Minerals (CENTRUM PO), Take by mouth, Disp: , Rfl:   •  omeprazole (PriLOSEC) 20 mg delayed release capsule, Take 1 capsule (20 mg total) by mouth daily, Disp: 90 capsule, Rfl: 1  •  valsartan (DIOVAN) 40 mg tablet, Take 1 tablet (40 mg total) by mouth daily, Disp: 90 tablet, Rfl: 1  •  VITAMIN E PO, Take 184 mg by mouth , Disp: , Rfl:         Review of Systems:  Review of Systems   Constitutional:  Positive for fatigue. Negative for activity change, fever and unexpected weight change.   HENT:  Negative for facial swelling, nosebleeds and voice change.    Respiratory:  Negative for chest tightness, shortness of breath and wheezing.    Cardiovascular:  Negative for chest pain, palpitations and leg swelling.   Gastrointestinal:  Negative for abdominal distention.   Genitourinary:  Negative for  hematuria.   Musculoskeletal:  Negative for arthralgias.   Skin:  Negative for color change, pallor, rash and wound.   Neurological:  Negative for dizziness, seizures, syncope and light-headedness.   Psychiatric/Behavioral:  Negative for agitation.          Physical Exam:  Physical Exam  Vitals reviewed.   Constitutional:       Appearance: She is well-developed.   HENT:      Head: Normocephalic and atraumatic.   Cardiovascular:      Rate and Rhythm: Normal rate and regular rhythm.      Heart sounds: Normal heart sounds. No murmur heard.  Pulmonary:      Effort: Pulmonary effort is normal.      Breath sounds: Normal breath sounds.   Abdominal:      Palpations: Abdomen is soft.   Musculoskeletal:         General: Normal range of motion.      Cervical back: Normal range of motion and neck supple.      Right lower leg: No edema.      Left lower leg: No edema.   Skin:     General: Skin is warm and dry.   Neurological:      Mental Status: She is alert and oriented to person, place, and time.   Psychiatric:         Behavior: Behavior normal.         Thought Content: Thought content normal.         Judgment: Judgment normal.         This note was completed in part utilizing [employer] Software.  Grammatical errors, random word insertions, spelling mistakes, and incomplete sentences can be an occasional consequence of this system secondary to software limitations, ambient noise, and hardware issues.  If you have any questions or concerns about the content, text, or information contained within the body of this dictation, please contact the provider for clarification.

## 2024-05-16 ENCOUNTER — RA CDI HCC (OUTPATIENT)
Dept: OTHER | Facility: HOSPITAL | Age: 64
End: 2024-05-16

## 2024-05-16 PROBLEM — Z86.73 HISTORY OF STROKE: Status: ACTIVE | Noted: 2024-05-16

## 2024-05-22 ENCOUNTER — OFFICE VISIT (OUTPATIENT)
Dept: FAMILY MEDICINE CLINIC | Facility: CLINIC | Age: 64
End: 2024-05-22
Payer: MEDICARE

## 2024-05-22 VITALS
SYSTOLIC BLOOD PRESSURE: 122 MMHG | HEIGHT: 61 IN | DIASTOLIC BLOOD PRESSURE: 84 MMHG | OXYGEN SATURATION: 96 % | HEART RATE: 103 BPM | WEIGHT: 152.8 LBS | BODY MASS INDEX: 28.85 KG/M2 | TEMPERATURE: 98.6 F

## 2024-05-22 DIAGNOSIS — Z00.00 MEDICARE ANNUAL WELLNESS VISIT, SUBSEQUENT: Primary | ICD-10-CM

## 2024-05-22 DIAGNOSIS — Z12.4 SCREENING FOR CERVICAL CANCER: ICD-10-CM

## 2024-05-22 PROCEDURE — G0438 PPPS, INITIAL VISIT: HCPCS | Performed by: FAMILY MEDICINE

## 2024-05-22 NOTE — PATIENT INSTRUCTIONS
Medicare Preventive Visit Patient Instructions  Thank you for completing your Welcome to Medicare Visit or Medicare Annual Wellness Visit today. Your next wellness visit will be due in one year (5/23/2025).  The screening/preventive services that you may require over the next 5-10 years are detailed below. Some tests may not apply to you based off risk factors and/or age. Screening tests ordered at today's visit but not completed yet may show as past due. Also, please note that scanned in results may not display below.  Preventive Screenings:  Service Recommendations Previous Testing/Comments   Colorectal Cancer Screening  * Colonoscopy    * Fecal Occult Blood Test (FOBT)/Fecal Immunochemical Test (FIT)  * Fecal DNA/Cologuard Test  * Flexible Sigmoidoscopy Age: 45-75 years old   Colonoscopy: every 10 years (may be performed more frequently if at higher risk)  OR  FOBT/FIT: every 1 year  OR  Cologuard: every 3 years  OR  Sigmoidoscopy: every 5 years  Screening may be recommended earlier than age 45 if at higher risk for colorectal cancer. Also, an individualized decision between you and your healthcare provider will decide whether screening between the ages of 76-85 would be appropriate. Colonoscopy: 10/13/2020  FOBT/FIT: Not on file  Cologuard: Not on file  Sigmoidoscopy: Not on file    Screening Current     Breast Cancer Screening Age: 40+ years old  Frequency: every 1-2 years  Not required if history of left and right mastectomy Mammogram: 10/11/2023    Screening Current   Cervical Cancer Screening Between the ages of 21-29, pap smear recommended once every 3 years.   Between the ages of 30-65, can perform pap smear with HPV co-testing every 5 years.   Recommendations may differ for women with a history of total hysterectomy, cervical cancer, or abnormal pap smears in past. Pap Smear: 12/15/2023    Screening Not Indicated   Hepatitis C Screening Once for adults born between 1945 and 1965  More frequently in  patients at high risk for Hepatitis C Hep C Antibody: Not on file        Diabetes Screening 1-2 times per year if you're at risk for diabetes or have pre-diabetes Fasting glucose: 85 mg/dL (10/23/2023)  A1C: 5.6 % (7/12/2019)  Screening Current   Cholesterol Screening Once every 5 years if you don't have a lipid disorder. May order more often based on risk factors. Lipid panel: 10/23/2023    Screening Not Indicated  History Lipid Disorder     Other Preventive Screenings Covered by Medicare:  Abdominal Aortic Aneurysm (AAA) Screening: covered once if your at risk. You're considered to be at risk if you have a family history of AAA.  Lung Cancer Screening: covers low dose CT scan once per year if you meet all of the following conditions: (1) Age 55-77; (2) No signs or symptoms of lung cancer; (3) Current smoker or have quit smoking within the last 15 years; (4) You have a tobacco smoking history of at least 20 pack years (packs per day multiplied by number of years you smoked); (5) You get a written order from a healthcare provider.  Glaucoma Screening: covered annually if you're considered high risk: (1) You have diabetes OR (2) Family history of glaucoma OR (3)  aged 50 and older OR (4)  American aged 65 and older  Osteoporosis Screening: covered every 2 years if you meet one of the following conditions: (1) You're estrogen deficient and at risk for osteoporosis based off medical history and other findings; (2) Have a vertebral abnormality; (3) On glucocorticoid therapy for more than 3 months; (4) Have primary hyperparathyroidism; (5) On osteoporosis medications and need to assess response to drug therapy.   Last bone density test (DXA Scan): 10/09/2018.  HIV Screening: covered annually if you're between the age of 15-65. Also covered annually if you are younger than 15 and older than 65 with risk factors for HIV infection. For pregnant patients, it is covered up to 3 times per  pregnancy.    Immunizations:  Immunization Recommendations   Influenza Vaccine Annual influenza vaccination during flu season is recommended for all persons aged >= 6 months who do not have contraindications   Pneumococcal Vaccine   * Pneumococcal conjugate vaccine = PCV13 (Prevnar 13), PCV15 (Vaxneuvance), PCV20 (Prevnar 20)  * Pneumococcal polysaccharide vaccine = PPSV23 (Pneumovax) Adults 19-65 yo with certain risk factors or if 65+ yo  If never received any pneumonia vaccine: recommend Prevnar 20 (PCV20)  Give PCV20 if previously received 1 dose of PCV13 or PPSV23   Hepatitis B Vaccine 3 dose series if at intermediate or high risk (ex: diabetes, end stage renal disease, liver disease)   Respiratory syncytial virus (RSV) Vaccine - COVERED BY MEDICARE PART D  * RSVPreF3 (Arexvy) CDC recommends that adults 60 years of age and older may receive a single dose of RSV vaccine using shared clinical decision-making (SCDM)   Tetanus (Td) Vaccine - COST NOT COVERED BY MEDICARE PART B Following completion of primary series, a booster dose should be given every 10 years to maintain immunity against tetanus. Td may also be given as tetanus wound prophylaxis.   Tdap Vaccine - COST NOT COVERED BY MEDICARE PART B Recommended at least once for all adults. For pregnant patients, recommended with each pregnancy.   Shingles Vaccine (Shingrix) - COST NOT COVERED BY MEDICARE PART B  2 shot series recommended in those 19 years and older who have or will have weakened immune systems or those 50 years and older     Health Maintenance Due:      Topic Date Due   • Hepatitis C Screening  Never done   • HIV Screening  Never done   • Cervical Cancer Screening  09/24/2022   • Breast Cancer Screening: Mammogram  10/11/2024   • Colorectal Cancer Screening  10/13/2030     Immunizations Due:      Topic Date Due   • Pneumococcal Vaccine: 65+ Years (1 of 1 - PCV) Never done   • COVID-19 Vaccine (6 - 2023-24 season) 09/01/2023     Advance Directives    What are advance directives?  Advance directives are legal documents that state your wishes and plans for medical care. These plans are made ahead of time in case you lose your ability to make decisions for yourself. Advance directives can apply to any medical decision, such as the treatments you want, and if you want to donate organs.   What are the types of advance directives?  There are many types of advance directives, and each state has rules about how to use them. You may choose a combination of any of the following:  Living will:  This is a written record of the treatment you want. You can also choose which treatments you do not want, which to limit, and which to stop at a certain time. This includes surgery, medicine, IV fluid, and tube feedings.   Durable power of  for healthcare (DPAHC):  This is a written record that states who you want to make healthcare choices for you when you are unable to make them for yourself. This person, called a proxy, is usually a family member or a friend. You may choose more than 1 proxy.  Do not resuscitate (DNR) order:  A DNR order is used in case your heart stops beating or you stop breathing. It is a request not to have certain forms of treatment, such as CPR. A DNR order may be included in other types of advance directives.  Medical directive:  This covers the care that you want if you are in a coma, near death, or unable to make decisions for yourself. You can list the treatments you want for each condition. Treatment may include pain medicine, surgery, blood transfusions, dialysis, IV or tube feedings, and a ventilator (breathing machine).  Values history:  This document has questions about your views, beliefs, and how you feel and think about life. This information can help others choose the care that you would choose.  Why are advance directives important?  An advance directive helps you control your care. Although spoken wishes may be used, it is better to  have your wishes written down. Spoken wishes can be misunderstood, or not followed. Treatments may be given even if you do not want them. An advance directive may make it easier for your family to make difficult choices about your care.   Weight Management   Why it is important to manage your weight:  Being overweight increases your risk of health conditions such as heart disease, high blood pressure, type 2 diabetes, and certain types of cancer. It can also increase your risk for osteoarthritis, sleep apnea, and other respiratory problems. Aim for a slow, steady weight loss. Even a small amount of weight loss can lower your risk of health problems.  How to lose weight safely:  A safe and healthy way to lose weight is to eat fewer calories and get regular exercise. You can lose up about 1 pound a week by decreasing the number of calories you eat by 500 calories each day.   Healthy meal plan for weight management:  A healthy meal plan includes a variety of foods, contains fewer calories, and helps you stay healthy. A healthy meal plan includes the following:  Eat whole-grain foods more often.  A healthy meal plan should contain fiber. Fiber is the part of grains, fruits, and vegetables that is not broken down by your body. Whole-grain foods are healthy and provide extra fiber in your diet. Some examples of whole-grain foods are whole-wheat breads and pastas, oatmeal, brown rice, and bulgur.  Eat a variety of vegetables every day.  Include dark, leafy greens such as spinach, kale, rony greens, and mustard greens. Eat yellow and orange vegetables such as carrots, sweet potatoes, and winter squash.   Eat a variety of fruits every day.  Choose fresh or canned fruit (canned in its own juice or light syrup) instead of juice. Fruit juice has very little or no fiber.  Eat low-fat dairy foods.  Drink fat-free (skim) milk or 1% milk. Eat fat-free yogurt and low-fat cottage cheese. Try low-fat cheeses such as mozzarella and  other reduced-fat cheeses.  Choose meat and other protein foods that are low in fat.  Choose beans or other legumes such as split peas or lentils. Choose fish, skinless poultry (chicken or turkey), or lean cuts of red meat (beef or pork). Before you cook meat or poultry, cut off any visible fat.   Use less fat and oil.  Try baking foods instead of frying them. Add less fat, such as margarine, sour cream, regular salad dressing and mayonnaise to foods. Eat fewer high-fat foods. Some examples of high-fat foods include french fries, doughnuts, ice cream, and cakes.  Eat fewer sweets.  Limit foods and drinks that are high in sugar. This includes candy, cookies, regular soda, and sweetened drinks.  Exercise:  Exercise at least 30 minutes per day on most days of the week. Some examples of exercise include walking, biking, dancing, and swimming. You can also fit in more physical activity by taking the stairs instead of the elevator or parking farther away from stores. Ask your healthcare provider about the best exercise plan for you.      © Copyright [employer] 2018 Information is for End User's use only and may not be sold, redistributed or otherwise used for commercial purposes. All illustrations and images included in CareNotes® are the copyrighted property of [employer] or [employer]      Medicare Preventive Visit Patient Instructions  Thank you for completing your Welcome to Medicare Visit or Medicare Annual Wellness Visit today. Your next wellness visit will be due in one year (5/23/2025).  The screening/preventive services that you may require over the next 5-10 years are detailed below. Some tests may not apply to you based off risk factors and/or age. Screening tests ordered at today's visit but not completed yet may show as past due. Also, please note that scanned in results may not display below.  Preventive Screenings:  Service Recommendations Previous Testing/Comments   Colorectal Cancer  Screening  * Colonoscopy    * Fecal Occult Blood Test (FOBT)/Fecal Immunochemical Test (FIT)  * Fecal DNA/Cologuard Test  * Flexible Sigmoidoscopy Age: 45-75 years old   Colonoscopy: every 10 years (may be performed more frequently if at higher risk)  OR  FOBT/FIT: every 1 year  OR  Cologuard: every 3 years  OR  Sigmoidoscopy: every 5 years  Screening may be recommended earlier than age 45 if at higher risk for colorectal cancer. Also, an individualized decision between you and your healthcare provider will decide whether screening between the ages of 76-85 would be appropriate. Colonoscopy: 10/13/2020  FOBT/FIT: Not on file  Cologuard: Not on file  Sigmoidoscopy: Not on file    Screening Current     Breast Cancer Screening Age: 40+ years old  Frequency: every 1-2 years  Not required if history of left and right mastectomy Mammogram: 10/11/2023    Screening Current   Cervical Cancer Screening Between the ages of 21-29, pap smear recommended once every 3 years.   Between the ages of 30-65, can perform pap smear with HPV co-testing every 5 years.   Recommendations may differ for women with a history of total hysterectomy, cervical cancer, or abnormal pap smears in past. Pap Smear: 12/15/2023    Screening Not Indicated   Hepatitis C Screening Once for adults born between 1945 and 1965  More frequently in patients at high risk for Hepatitis C Hep C Antibody: Not on file        Diabetes Screening 1-2 times per year if you're at risk for diabetes or have pre-diabetes Fasting glucose: 85 mg/dL (10/23/2023)  A1C: 5.6 % (7/12/2019)  Screening Current   Cholesterol Screening Once every 5 years if you don't have a lipid disorder. May order more often based on risk factors. Lipid panel: 10/23/2023    Screening Not Indicated  History Lipid Disorder     Other Preventive Screenings Covered by Medicare:  Abdominal Aortic Aneurysm (AAA) Screening: covered once if your at risk. You're considered to be at risk if you have a family  history of AAA.  Lung Cancer Screening: covers low dose CT scan once per year if you meet all of the following conditions: (1) Age 55-77; (2) No signs or symptoms of lung cancer; (3) Current smoker or have quit smoking within the last 15 years; (4) You have a tobacco smoking history of at least 20 pack years (packs per day multiplied by number of years you smoked); (5) You get a written order from a healthcare provider.  Glaucoma Screening: covered annually if you're considered high risk: (1) You have diabetes OR (2) Family history of glaucoma OR (3)  aged 50 and older OR (4)  American aged 65 and older  Osteoporosis Screening: covered every 2 years if you meet one of the following conditions: (1) You're estrogen deficient and at risk for osteoporosis based off medical history and other findings; (2) Have a vertebral abnormality; (3) On glucocorticoid therapy for more than 3 months; (4) Have primary hyperparathyroidism; (5) On osteoporosis medications and need to assess response to drug therapy.   Last bone density test (DXA Scan): 10/09/2018.  HIV Screening: covered annually if you're between the age of 15-65. Also covered annually if you are younger than 15 and older than 65 with risk factors for HIV infection. For pregnant patients, it is covered up to 3 times per pregnancy.    Immunizations:  Immunization Recommendations   Influenza Vaccine Annual influenza vaccination during flu season is recommended for all persons aged >= 6 months who do not have contraindications   Pneumococcal Vaccine   * Pneumococcal conjugate vaccine = PCV13 (Prevnar 13), PCV15 (Vaxneuvance), PCV20 (Prevnar 20)  * Pneumococcal polysaccharide vaccine = PPSV23 (Pneumovax) Adults 19-63 yo with certain risk factors or if 65+ yo  If never received any pneumonia vaccine: recommend Prevnar 20 (PCV20)  Give PCV20 if previously received 1 dose of PCV13 or PPSV23   Hepatitis B Vaccine 3 dose series if at intermediate or high  risk (ex: diabetes, end stage renal disease, liver disease)   Respiratory syncytial virus (RSV) Vaccine - COVERED BY MEDICARE PART D  * RSVPreF3 (Arexvy) CDC recommends that adults 60 years of age and older may receive a single dose of RSV vaccine using shared clinical decision-making (SCDM)   Tetanus (Td) Vaccine - COST NOT COVERED BY MEDICARE PART B Following completion of primary series, a booster dose should be given every 10 years to maintain immunity against tetanus. Td may also be given as tetanus wound prophylaxis.   Tdap Vaccine - COST NOT COVERED BY MEDICARE PART B Recommended at least once for all adults. For pregnant patients, recommended with each pregnancy.   Shingles Vaccine (Shingrix) - COST NOT COVERED BY MEDICARE PART B  2 shot series recommended in those 19 years and older who have or will have weakened immune systems or those 50 years and older     Health Maintenance Due:      Topic Date Due   • Hepatitis C Screening  Never done   • HIV Screening  Never done   • Cervical Cancer Screening  09/24/2022   • Breast Cancer Screening: Mammogram  10/11/2024   • Colorectal Cancer Screening  10/13/2030     Immunizations Due:      Topic Date Due   • Pneumococcal Vaccine: 65+ Years (1 of 1 - PCV) Never done   • COVID-19 Vaccine (6 - 2023-24 season) 09/01/2023     Advance Directives   What are advance directives?  Advance directives are legal documents that state your wishes and plans for medical care. These plans are made ahead of time in case you lose your ability to make decisions for yourself. Advance directives can apply to any medical decision, such as the treatments you want, and if you want to donate organs.   What are the types of advance directives?  There are many types of advance directives, and each state has rules about how to use them. You may choose a combination of any of the following:  Living will:  This is a written record of the treatment you want. You can also choose which treatments you  do not want, which to limit, and which to stop at a certain time. This includes surgery, medicine, IV fluid, and tube feedings.   Durable power of  for healthcare (DPAHC):  This is a written record that states who you want to make healthcare choices for you when you are unable to make them for yourself. This person, called a proxy, is usually a family member or a friend. You may choose more than 1 proxy.  Do not resuscitate (DNR) order:  A DNR order is used in case your heart stops beating or you stop breathing. It is a request not to have certain forms of treatment, such as CPR. A DNR order may be included in other types of advance directives.  Medical directive:  This covers the care that you want if you are in a coma, near death, or unable to make decisions for yourself. You can list the treatments you want for each condition. Treatment may include pain medicine, surgery, blood transfusions, dialysis, IV or tube feedings, and a ventilator (breathing machine).  Values history:  This document has questions about your views, beliefs, and how you feel and think about life. This information can help others choose the care that you would choose.  Why are advance directives important?  An advance directive helps you control your care. Although spoken wishes may be used, it is better to have your wishes written down. Spoken wishes can be misunderstood, or not followed. Treatments may be given even if you do not want them. An advance directive may make it easier for your family to make difficult choices about your care.   Weight Management   Why it is important to manage your weight:  Being overweight increases your risk of health conditions such as heart disease, high blood pressure, type 2 diabetes, and certain types of cancer. It can also increase your risk for osteoarthritis, sleep apnea, and other respiratory problems. Aim for a slow, steady weight loss. Even a small amount of weight loss can lower your risk  of health problems.  How to lose weight safely:  A safe and healthy way to lose weight is to eat fewer calories and get regular exercise. You can lose up about 1 pound a week by decreasing the number of calories you eat by 500 calories each day.   Healthy meal plan for weight management:  A healthy meal plan includes a variety of foods, contains fewer calories, and helps you stay healthy. A healthy meal plan includes the following:  Eat whole-grain foods more often.  A healthy meal plan should contain fiber. Fiber is the part of grains, fruits, and vegetables that is not broken down by your body. Whole-grain foods are healthy and provide extra fiber in your diet. Some examples of whole-grain foods are whole-wheat breads and pastas, oatmeal, brown rice, and bulgur.  Eat a variety of vegetables every day.  Include dark, leafy greens such as spinach, kale, rony greens, and mustard greens. Eat yellow and orange vegetables such as carrots, sweet potatoes, and winter squash.   Eat a variety of fruits every day.  Choose fresh or canned fruit (canned in its own juice or light syrup) instead of juice. Fruit juice has very little or no fiber.  Eat low-fat dairy foods.  Drink fat-free (skim) milk or 1% milk. Eat fat-free yogurt and low-fat cottage cheese. Try low-fat cheeses such as mozzarella and other reduced-fat cheeses.  Choose meat and other protein foods that are low in fat.  Choose beans or other legumes such as split peas or lentils. Choose fish, skinless poultry (chicken or turkey), or lean cuts of red meat (beef or pork). Before you cook meat or poultry, cut off any visible fat.   Use less fat and oil.  Try baking foods instead of frying them. Add less fat, such as margarine, sour cream, regular salad dressing and mayonnaise to foods. Eat fewer high-fat foods. Some examples of high-fat foods include french fries, doughnuts, ice cream, and cakes.  Eat fewer sweets.  Limit foods and drinks that are high in sugar.  This includes candy, cookies, regular soda, and sweetened drinks.  Exercise:  Exercise at least 30 minutes per day on most days of the week. Some examples of exercise include walking, biking, dancing, and swimming. You can also fit in more physical activity by taking the stairs instead of the elevator or parking farther away from stores. Ask your healthcare provider about the best exercise plan for you.      © Copyright [employer] 2018 Information is for End User's use only and may not be sold, redistributed or otherwise used for commercial purposes. All illustrations and images included in CareNotes® are the copyrighted property of [employer] or [employer]

## 2024-05-22 NOTE — PROGRESS NOTES
Ambulatory Visit  Name: Margi Duarte      : 1960      MRN: [id number]  Encounter Provider: Chavo Calle DO  Encounter Date: 2024   Encounter department: [facility]    Assessment & Plan   Continue same medications.  Follow-up with specialist as scheduled.  Continue to watch diet.  Follow-up in 4 months or as needed  1. Medicare annual wellness visit, subsequent  2. Screening for cervical cancer      Depression Screening and Follow-up Plan: Patient was screened for depression during today's encounter. They screened negative with a PHQ-2 score of 0.      Preventive health issues were discussed with patient, and age appropriate screening tests were ordered as noted in patient's After Visit Summary. Personalized health advice and appropriate referrals for health education or preventive services given if needed, as noted in patient's After Visit Summary.    History of Present Illness     Medicare well visit.  Patient denies any chest pain or shortness of breath.  Feeling well.  No new concerns.       Patient Care Team:  Chavo Calle DO as PCP - General  MD Chavo Hicks DO Jonathan Bingham, MD    Review of Systems   Constitutional: Negative.    Respiratory: Negative.     Cardiovascular: Negative.    Gastrointestinal: Negative.    Genitourinary: Negative.      Medical History Reviewed by provider this encounter:  Tobacco  Allergies  Meds  Problems  Med Hx  Surg Hx  Fam Hx       Annual Wellness Visit Questionnaire   Margi is here for her Subsequent Wellness visit.     Health Risk Assessment:   Patient rates overall health as very good. Patient feels that their physical health rating is same. Patient is very satisfied with their life. Eyesight was rated as same. Hearing was rated as same. Patient feels that their emotional and mental health rating is same. Patients states they are never, rarely angry. Patient states they are sometimes unusually  tired/fatigued. Pain experienced in the last 7 days has been none. Patient states that she has experienced no weight loss or gain in last 6 months.     Depression Screening:   PHQ-2 Score: 0      Fall Risk Screening:   In the past year, patient has experienced: no history of falling in past year      Urinary Incontinence Screening:   Patient has not leaked urine accidently in the last six months.     Home Safety:  Patient does not have trouble with stairs inside or outside of their home. Patient has working smoke alarms and has working carbon monoxide detector. Home safety hazards include: none.     Nutrition:   Current diet is Regular, Low Cholesterol and Low Carb.     Medications:   Patient is currently taking over-the-counter supplements. OTC medications include: see medication list. Patient is able to manage medications.     Activities of Daily Living (ADLs)/Instrumental Activities of Daily Living (IADLs):   Walk and transfer into and out of bed and chair?: Yes  Dress and groom yourself?: Yes    Bathe or shower yourself?: Yes    Feed yourself? Yes  Do your laundry/housekeeping?: Yes  Manage your money, pay your bills and track your expenses?: Yes  Make your own meals?: Yes    Do your own shopping?: Yes    Previous Hospitalizations:   Any hospitalizations or ED visits within the last 12 months?: No      Advance Care Planning:   Living will: Yes    Advanced directive: Yes    Advanced directive counseling given: Yes      Cognitive Screening:   Provider or family/friend/caregiver concerned regarding cognition?: No    PREVENTIVE SCREENINGS      Cardiovascular Screening:    General: Screening Not Indicated and History Lipid Disorder      Diabetes Screening:     General: Screening Current      Colorectal Cancer Screening:     General: Screening Current      Breast Cancer Screening:     General: Screening Current      Cervical Cancer Screening:    General: Screening Not Indicated      Osteoporosis Screening:    General:  "Screening Not Indicated and History Osteoporosis      Lung Cancer Screening:     General: Screening Not Indicated    Screening, Brief Intervention, and Referral to Treatment (SBIRT)    Screening  Typical number of drinks in a day: 1  Typical number of drinks in a week: 7  Interpretation: Low risk drinking behavior.    Single Item Drug Screening:  How often have you used an illegal drug (including marijuana) or a prescription medication for non-medical reasons in the past year? never    Single Item Drug Screen Score: 0  Interpretation: Negative screen for possible drug use disorder    Social Determinants of Health     Financial Resource Strain: Low Risk  (4/17/2023)    Overall Financial Resource Strain (CARDIA)    • Difficulty of Paying Living Expenses: Not hard at all   Food Insecurity: No Food Insecurity (5/22/2024)    Hunger Vital Sign    • Worried About Running Out of Food in the Last Year: Never true    • Ran Out of Food in the Last Year: Never true   Transportation Needs: No Transportation Needs (5/22/2024)    PRAPARE - Transportation    • Lack of Transportation (Medical): No    • Lack of Transportation (Non-Medical): No   Housing Stability: Low Risk  (5/22/2024)    Housing Stability Vital Sign    • Unable to Pay for Housing in the Last Year: No    • Number of Times Moved in the Last Year: 0    • Homeless in the Last Year: No   Utilities: Not At Risk (5/22/2024)    [facility] Utilities    • Threatened with loss of utilities: No     No results found.    Objective     /84   Pulse 103   Temp 98.6 °F (37 °C)   Ht 5' 1\" (1.549 m)   Wt 69.3 kg (152 lb 12.8 oz)   LMP  (LMP Unknown)   SpO2 96%   BMI 28.87 kg/m²     Physical Exam  Vitals reviewed.   Constitutional:       General: She is not in acute distress.     Appearance: Normal appearance. She is well-developed. She is not diaphoretic.   HENT:      Head: Normocephalic and atraumatic.   Eyes:      Conjunctiva/sclera: Conjunctivae normal.   Cardiovascular:      " Rate and Rhythm: Normal rate and regular rhythm.      Heart sounds: Normal heart sounds. No murmur heard.     No friction rub. No gallop.   Pulmonary:      Effort: Pulmonary effort is normal. No respiratory distress.      Breath sounds: Normal breath sounds. No wheezing, rhonchi or rales.   Musculoskeletal:      Right lower leg: No edema.      Left lower leg: No edema.   Neurological:      General: No focal deficit present.      Mental Status: She is alert and oriented to person, place, and time.   Psychiatric:         Mood and Affect: Mood normal.         Behavior: Behavior normal.         Thought Content: Thought content normal.         Judgment: Judgment normal.       Administrative Statements

## 2024-05-23 ENCOUNTER — TELEPHONE (OUTPATIENT)
Dept: ADMINISTRATIVE | Facility: OTHER | Age: 64
End: 2024-05-23

## 2024-05-23 NOTE — TELEPHONE ENCOUNTER
Upon review of the In Basket request we were able to identify that the patient had the requested item(s) completed internally. Internal labs/procedures/tests are not able to be linked to . HM will show as updated but a hyperlink to the document is not possible at this time. The item(s) requested can be found within the Chart Review tabs.     Per the office note on 12/15/2023, a cervical cancer screening was not completed and is not due until 12/2024. Dr. Espinosa is an internal [facility] provider & once a cervical cancer screening is completed by her, it will automatically be linked to the patient's health maintenance.     Any additional questions or concerns should be emailed to the Practice Liaisons via the appropriate education email address, please do not reply via In Basket.    Thank you  Katelyn Villanueva

## 2024-05-23 NOTE — TELEPHONE ENCOUNTER
----- Message from Nereida PAGE sent at 5/22/2024  4:18 PM EDT -----  Regarding: pap  05/22/24 4:18 PM    Hello, our patient Margi Duarte has had Pap Smear (HPV) aka Cervical Cancer Screening completed/performed. Please assist in updating the patient chart by pulling a previous Electronic Medical Record (EMR) document. The previous EMR is Dr Espinosa. The date of service is 2023.    Thank you,  Nereida Newell  [facility]

## 2024-08-07 DIAGNOSIS — I63.512 ACUTE ISCHEMIC LEFT MIDDLE CEREBRAL ARTERY (MCA) STROKE (HCC): ICD-10-CM

## 2024-08-07 DIAGNOSIS — E78.49 OTHER HYPERLIPIDEMIA: ICD-10-CM

## 2024-08-07 DIAGNOSIS — I10 ESSENTIAL HYPERTENSION: ICD-10-CM

## 2024-08-07 RX ORDER — VALSARTAN 40 MG/1
40 TABLET ORAL DAILY
Qty: 90 TABLET | Refills: 0 | Status: SHIPPED | OUTPATIENT
Start: 2024-08-07

## 2024-08-07 RX ORDER — OMEPRAZOLE 20 MG/1
20 CAPSULE, DELAYED RELEASE ORAL DAILY
Qty: 90 CAPSULE | Refills: 0 | Status: SHIPPED | OUTPATIENT
Start: 2024-08-07

## 2024-08-07 RX ORDER — ATORVASTATIN CALCIUM 20 MG/1
20 TABLET, FILM COATED ORAL EVERY EVENING
Qty: 90 TABLET | Refills: 0 | Status: SHIPPED | OUTPATIENT
Start: 2024-08-07

## 2024-08-07 NOTE — TELEPHONE ENCOUNTER
States that she does not have a doctor at the moment since Dr. Calle left      Reason for call:   [x] Refill   [] Prior Auth  [] Other:     Office:   [x] PCP/Provider - Chavo Calle/[facility]      [] Specialty/Provider -     Pharmacy: [facility]    Does the patient have enough for 3 days?   [x] Yes   [] No - Send as HP to POD

## 2024-08-22 ENCOUNTER — OFFICE VISIT (OUTPATIENT)
Dept: FAMILY MEDICINE CLINIC | Facility: CLINIC | Age: 64
End: 2024-08-22
Payer: MEDICARE

## 2024-08-22 ENCOUNTER — HOSPITAL ENCOUNTER (OUTPATIENT)
Dept: CT IMAGING | Facility: HOSPITAL | Age: 64
Discharge: HOME | End: 2024-08-22
Payer: MEDICARE

## 2024-08-22 VITALS
DIASTOLIC BLOOD PRESSURE: 74 MMHG | BODY MASS INDEX: 28.7 KG/M2 | WEIGHT: 152 LBS | RESPIRATION RATE: 18 BRPM | OXYGEN SATURATION: 96 % | TEMPERATURE: 97.9 F | SYSTOLIC BLOOD PRESSURE: 120 MMHG | HEIGHT: 61 IN | HEART RATE: 76 BPM

## 2024-08-22 DIAGNOSIS — R31.0 GROSS HEMATURIA: ICD-10-CM

## 2024-08-22 DIAGNOSIS — R10.9 RIGHT FLANK PAIN: Primary | ICD-10-CM

## 2024-08-22 DIAGNOSIS — R10.9 RIGHT FLANK PAIN: ICD-10-CM

## 2024-08-22 LAB
SL AMB  POCT GLUCOSE, UA: NORMAL
SL AMB LEUKOCYTE ESTERASE,UA: NORMAL
SL AMB POCT BILIRUBIN,UA: NORMAL
SL AMB POCT BLOOD,UA: NORMAL
SL AMB POCT CLARITY,UA: CLEAR
SL AMB POCT COLOR,UA: YELLOW
SL AMB POCT KETONES,UA: NORMAL
SL AMB POCT NITRITE,UA: NORMAL
SL AMB POCT PH,UA: 5.5
SL AMB POCT SPECIFIC GRAVITY,UA: 1.02
SL AMB POCT URINE PROTEIN: NORMAL
SL AMB POCT UROBILINOGEN: 0.2

## 2024-08-22 PROCEDURE — 74176 CT ABD & PELVIS W/O CONTRAST: CPT

## 2024-08-22 PROCEDURE — 87086 URINE CULTURE/COLONY COUNT: CPT | Performed by: PHYSICIAN ASSISTANT

## 2024-08-22 PROCEDURE — G2211 COMPLEX E/M VISIT ADD ON: HCPCS | Performed by: PHYSICIAN ASSISTANT

## 2024-08-22 PROCEDURE — 81002 URINALYSIS NONAUTO W/O SCOPE: CPT | Performed by: PHYSICIAN ASSISTANT

## 2024-08-22 PROCEDURE — 99214 OFFICE O/P EST MOD 30 MIN: CPT | Performed by: PHYSICIAN ASSISTANT

## 2024-08-22 NOTE — PROGRESS NOTES
Ambulatory Visit  Name: Margi Duarte      : 1960      MRN: [id number]  Encounter Provider: Jelly Phan PA-C  Encounter Date: 2024   Encounter department: [facility]    Assessment & Plan   1. Right flank pain  Assessment & Plan:  Urinalysis in office negative for blood or infection  Will send out urine culture to confirm  Patient does have a history of kidney stones.  She history of obstructive kidney stone requiring lithotripsy and placement.  Will get CT stone study  Recommended that she drink plenty of water  We will call with recommendations after CT scan is back.  Orders:  -     POCT urine dip  -     CT renal stone study abdomen pelvis wo contrast; Future; Expected date: 2024  -     Urine culture; Future  -     Urine culture  2. Gross hematuria  Assessment & Plan:  Urinalysis in office negative for blood or infection  Will send out urine culture to confirm  Patient does have a history of kidney stones.  She history of obstructive kidney stone requiring lithotripsy and placement.  Will get CT stone study  Recommended that she drink plenty of water  We will call with recommendations after CT scan is back.  Orders:  -     CT renal stone study abdomen pelvis wo contrast; Future; Expected date: 2024  -     Urine culture; Future  -     Urine culture       History of Present Illness     Margi very pleasant 64-year-old female who is here today complaining of urinary urgency, burning, and hematuria.  She notes that she had 1 episode of hematuria last night.  She saw blood in the toilet, but there was nothing on the paper after urinating.  She does have a history of kidney stones.  She admits that she has been having some right-sided low back pain.  She denies any fevers, chills, body aches, nausea, vomit, or diarrhea.        Review of Systems   Constitutional:  Negative for chills, diaphoresis, fatigue and fever.   HENT:  Negative for congestion, ear pain, postnasal drip,  "rhinorrhea, sneezing, sore throat and trouble swallowing.    Eyes:  Negative for pain and visual disturbance.   Respiratory:  Negative for apnea, cough, shortness of breath and wheezing.    Cardiovascular:  Negative for chest pain and palpitations.   Gastrointestinal:  Negative for abdominal pain, constipation, diarrhea, nausea and vomiting.   Genitourinary:  Positive for hematuria. Negative for dysuria.   Musculoskeletal:  Positive for back pain. Negative for arthralgias, gait problem and myalgias.   Neurological:  Negative for dizziness, syncope, weakness, light-headedness, numbness and headaches.   Psychiatric/Behavioral:  Negative for suicidal ideas. The patient is not nervous/anxious.        Objective     /74 (BP Location: Left arm, Patient Position: Sitting, Cuff Size: Adult)   Pulse 76   Temp 97.9 °F (36.6 °C) (Temporal)   Resp 18   Ht 5' 1\" (1.549 m)   Wt 68.9 kg (152 lb)   LMP  (LMP Unknown)   SpO2 96%   BMI 28.72 kg/m²     Physical Exam  Vitals and nursing note reviewed.   Constitutional:       General: She is not in acute distress.     Appearance: She is well-developed. She is not diaphoretic.   HENT:      Head: Normocephalic and atraumatic.      Right Ear: Hearing and external ear normal.      Left Ear: Hearing and external ear normal.      Nose: Nose normal. No mucosal edema or rhinorrhea.   Eyes:      Extraocular Movements: Extraocular movements intact.   Cardiovascular:      Rate and Rhythm: Normal rate and regular rhythm.      Heart sounds: Normal heart sounds. No murmur heard.     No friction rub. No gallop.   Pulmonary:      Effort: Pulmonary effort is normal. No respiratory distress.      Breath sounds: Normal breath sounds. No wheezing or rales.   Abdominal:      Palpations: Abdomen is soft.      Tenderness: There is no abdominal tenderness. There is right CVA tenderness (mild).   Musculoskeletal:         General: Normal range of motion.      Cervical back: Normal range of motion " and neck supple.   Skin:     General: Skin is warm and dry.      Findings: No rash.   Neurological:      Mental Status: She is alert and oriented to person, place, and time.   Psychiatric:         Behavior: Behavior normal.         Thought Content: Thought content normal.         Judgment: Judgment normal.       Administrative Statements

## 2024-08-22 NOTE — ASSESSMENT & PLAN NOTE
Urinalysis in office negative for blood or infection  Will send out urine culture to confirm  Patient does have a history of kidney stones.  She history of obstructive kidney stone requiring lithotripsy and placement.  Will get CT stone study  Recommended that she drink plenty of water  We will call with recommendations after CT scan is back.

## 2024-08-23 LAB — BACTERIA UR CULT: NORMAL

## 2024-09-18 ENCOUNTER — RA CDI HCC (OUTPATIENT)
Dept: OTHER | Facility: HOSPITAL | Age: 64
End: 2024-09-18

## 2024-09-24 ENCOUNTER — OFFICE VISIT (OUTPATIENT)
Dept: FAMILY MEDICINE CLINIC | Facility: CLINIC | Age: 64
End: 2024-09-24
Payer: MEDICARE

## 2024-09-24 VITALS
HEIGHT: 61 IN | BODY MASS INDEX: 28.89 KG/M2 | WEIGHT: 153 LBS | DIASTOLIC BLOOD PRESSURE: 82 MMHG | SYSTOLIC BLOOD PRESSURE: 124 MMHG | TEMPERATURE: 97.2 F | OXYGEN SATURATION: 97 % | HEART RATE: 102 BPM

## 2024-09-24 DIAGNOSIS — E78.49 OTHER HYPERLIPIDEMIA: ICD-10-CM

## 2024-09-24 DIAGNOSIS — Z23 ENCOUNTER FOR IMMUNIZATION: ICD-10-CM

## 2024-09-24 DIAGNOSIS — J30.1 SEASONAL ALLERGIC RHINITIS DUE TO POLLEN: ICD-10-CM

## 2024-09-24 DIAGNOSIS — I10 ESSENTIAL HYPERTENSION: Primary | ICD-10-CM

## 2024-09-24 DIAGNOSIS — Z86.73 HISTORY OF STROKE: ICD-10-CM

## 2024-09-24 PROBLEM — R31.29 MICROSCOPIC HEMATURIA: Status: RESOLVED | Noted: 2021-11-04 | Resolved: 2024-09-24

## 2024-09-24 PROBLEM — R10.9 RIGHT FLANK PAIN: Status: RESOLVED | Noted: 2024-08-22 | Resolved: 2024-09-24

## 2024-09-24 PROBLEM — R31.0 GROSS HEMATURIA: Status: RESOLVED | Noted: 2024-08-22 | Resolved: 2024-09-24

## 2024-09-24 PROBLEM — I63.9 STROKE ABORTED BY ADMINISTRATION OF THROMBOLYTIC AGENT (HCC): Status: RESOLVED | Noted: 2019-07-11 | Resolved: 2024-09-24

## 2024-09-24 PROCEDURE — G0008 ADMIN INFLUENZA VIRUS VAC: HCPCS | Performed by: PHYSICIAN ASSISTANT

## 2024-09-24 PROCEDURE — 90662 IIV NO PRSV INCREASED AG IM: CPT | Performed by: PHYSICIAN ASSISTANT

## 2024-09-24 PROCEDURE — 99214 OFFICE O/P EST MOD 30 MIN: CPT | Performed by: PHYSICIAN ASSISTANT

## 2024-09-24 RX ORDER — PHENOL 1.4 %
AEROSOL, SPRAY (ML) MUCOUS MEMBRANE DAILY PRN
COMMUNITY

## 2024-09-24 NOTE — ASSESSMENT & PLAN NOTE
Routine labs ordered.  Continue atorvastatin 20 mg daily.  Recommended low-fat diet  Orders:    Comprehensive metabolic panel; Future    Lipid panel; Future

## 2024-09-24 NOTE — ASSESSMENT & PLAN NOTE
Blood pressure well-controlled.  Continue valsartan 40 mg daily  Routine labs ordered  Orders:    Comprehensive metabolic panel; Future    CBC and differential; Future    TSH, 3rd generation with Free T4 reflex; Future

## 2024-09-24 NOTE — PROGRESS NOTES
Ambulatory Visit  Name: Margi Duarte      : 1960      MRN: [id number]  Encounter Provider: Jelly Phan PA-C  Encounter Date: 2024   Encounter department: [facility]    Assessment & Plan  Essential hypertension  Blood pressure well-controlled.  Continue valsartan 40 mg daily  Routine labs ordered  Orders:    Comprehensive metabolic panel; Future    CBC and differential; Future    TSH, 3rd generation with Free T4 reflex; Future    Other hyperlipidemia  Routine labs ordered.  Continue atorvastatin 20 mg daily.  Recommended low-fat diet  Orders:    Comprehensive metabolic panel; Future    Lipid panel; Future    Encounter for immunization    Orders:    influenza vaccine, high-dose, PF 0.5 mL (Fluzone High Dose)    History of stroke  Continue aspirin and atorvastatin.  Loop recorder was negative for atrial fibrillation         Seasonal allergic rhinitis due to pollen  Continue Allegra and Flonase            History of Present Illness     Margi is a very pleasant 64-year-old female who is here today for a routine checkup.  She denies any acute concerns or problems.  She has a history of a stroke.  She was released from cardiology.  Her loop recorder did not show any signs of atrial fibrillation.  She is doing fine on her aspirin and atorvastatin.  She is due for routine labs.  She plans to schedule her bone density.  She is scheduled for her mammogram in October.  She is up-to-date with GYN visits.  She would like her flu shot today.  She is doing fine on her blood pressure medication.  She denies any chest pains or shortness of breath.          Review of Systems   Constitutional:  Negative for chills, diaphoresis, fatigue and fever.   HENT:  Negative for congestion, ear pain, postnasal drip, rhinorrhea, sneezing, sore throat and trouble swallowing.    Eyes:  Negative for pain and visual disturbance.   Respiratory:  Negative for apnea, cough, shortness of breath and wheezing.   "  Cardiovascular:  Negative for chest pain and palpitations.   Gastrointestinal:  Negative for abdominal pain, constipation, diarrhea, nausea and vomiting.   Genitourinary:  Negative for dysuria and hematuria.   Musculoskeletal:  Negative for arthralgias, gait problem and myalgias.   Neurological:  Negative for dizziness, syncope, weakness, light-headedness, numbness and headaches.   Psychiatric/Behavioral:  Negative for suicidal ideas. The patient is not nervous/anxious.            Objective     /82   Pulse 102   Temp (!) 97.2 °F (36.2 °C)   Ht 5' 1\" (1.549 m)   Wt 69.4 kg (153 lb)   LMP  (LMP Unknown)   SpO2 97%   BMI 28.91 kg/m²     Physical Exam  Vitals and nursing note reviewed.   Constitutional:       Appearance: She is well-developed.   HENT:      Head: Normocephalic and atraumatic.      Right Ear: Tympanic membrane, ear canal and external ear normal.      Left Ear: Tympanic membrane, ear canal and external ear normal.      Nose: Nose normal.      Mouth/Throat:      Pharynx: No oropharyngeal exudate or posterior oropharyngeal erythema.   Eyes:      Extraocular Movements: Extraocular movements intact.   Cardiovascular:      Rate and Rhythm: Normal rate and regular rhythm.      Heart sounds: Normal heart sounds. No murmur heard.     No friction rub. No gallop.   Pulmonary:      Effort: Pulmonary effort is normal. No respiratory distress.      Breath sounds: Normal breath sounds. No wheezing or rales.   Musculoskeletal:         General: Normal range of motion.      Cervical back: Normal range of motion and neck supple.   Lymphadenopathy:      Cervical: No cervical adenopathy.   Skin:     General: Skin is warm and dry.   Neurological:      Mental Status: She is alert and oriented to person, place, and time.   Psychiatric:         Behavior: Behavior normal.         Thought Content: Thought content normal.         Judgment: Judgment normal.         "

## 2024-10-14 ENCOUNTER — HOSPITAL ENCOUNTER (OUTPATIENT)
Dept: MAMMOGRAPHY | Facility: HOSPITAL | Age: 64
Discharge: HOME | End: 2024-10-14
Attending: OBSTETRICS & GYNECOLOGY
Payer: MEDICARE

## 2024-10-14 VITALS — WEIGHT: 153 LBS | HEIGHT: 61 IN | BODY MASS INDEX: 28.89 KG/M2

## 2024-10-14 DIAGNOSIS — Z12.31 BREAST CANCER SCREENING BY MAMMOGRAM: ICD-10-CM

## 2024-10-14 PROCEDURE — 77067 SCR MAMMO BI INCL CAD: CPT

## 2024-10-14 PROCEDURE — 77063 BREAST TOMOSYNTHESIS BI: CPT

## 2024-10-24 ENCOUNTER — APPOINTMENT (OUTPATIENT)
Dept: LAB | Facility: CLINIC | Age: 64
End: 2024-10-24
Payer: MEDICARE

## 2024-10-24 DIAGNOSIS — E78.49 OTHER HYPERLIPIDEMIA: ICD-10-CM

## 2024-10-24 DIAGNOSIS — I10 ESSENTIAL HYPERTENSION: ICD-10-CM

## 2024-10-24 LAB
ALBUMIN SERPL BCG-MCNC: 4.3 G/DL (ref 3.5–5)
ALP SERPL-CCNC: 110 U/L (ref 34–104)
ALT SERPL W P-5'-P-CCNC: 13 U/L (ref 7–52)
ANION GAP SERPL CALCULATED.3IONS-SCNC: 7 MMOL/L (ref 4–13)
AST SERPL W P-5'-P-CCNC: 21 U/L (ref 13–39)
BASOPHILS # BLD AUTO: 0.07 THOUSANDS/ΜL (ref 0–0.1)
BASOPHILS NFR BLD AUTO: 1 % (ref 0–1)
BILIRUB SERPL-MCNC: 0.54 MG/DL (ref 0.2–1)
BUN SERPL-MCNC: 23 MG/DL (ref 5–25)
CALCIUM SERPL-MCNC: 9.9 MG/DL (ref 8.4–10.2)
CHLORIDE SERPL-SCNC: 102 MMOL/L (ref 96–108)
CHOLEST SERPL-MCNC: 178 MG/DL
CO2 SERPL-SCNC: 29 MMOL/L (ref 21–32)
CREAT SERPL-MCNC: 0.75 MG/DL (ref 0.6–1.3)
EOSINOPHIL # BLD AUTO: 0.2 THOUSAND/ΜL (ref 0–0.61)
EOSINOPHIL NFR BLD AUTO: 3 % (ref 0–6)
ERYTHROCYTE [DISTWIDTH] IN BLOOD BY AUTOMATED COUNT: 12.9 % (ref 11.6–15.1)
GFR SERPL CREATININE-BSD FRML MDRD: 83 ML/MIN/1.73SQ M
GLUCOSE P FAST SERPL-MCNC: 89 MG/DL (ref 65–99)
HCT VFR BLD AUTO: 47.9 % (ref 34.8–46.1)
HDLC SERPL-MCNC: 63 MG/DL
HGB BLD-MCNC: 15.5 G/DL (ref 11.5–15.4)
IMM GRANULOCYTES # BLD AUTO: 0.03 THOUSAND/UL (ref 0–0.2)
IMM GRANULOCYTES NFR BLD AUTO: 1 % (ref 0–2)
LDLC SERPL CALC-MCNC: 100 MG/DL (ref 0–100)
LYMPHOCYTES # BLD AUTO: 2.44 THOUSANDS/ΜL (ref 0.6–4.47)
LYMPHOCYTES NFR BLD AUTO: 41 % (ref 14–44)
MCH RBC QN AUTO: 31.3 PG (ref 26.8–34.3)
MCHC RBC AUTO-ENTMCNC: 32.4 G/DL (ref 31.4–37.4)
MCV RBC AUTO: 97 FL (ref 82–98)
MONOCYTES # BLD AUTO: 0.59 THOUSAND/ΜL (ref 0.17–1.22)
MONOCYTES NFR BLD AUTO: 10 % (ref 4–12)
NEUTROPHILS # BLD AUTO: 2.69 THOUSANDS/ΜL (ref 1.85–7.62)
NEUTS SEG NFR BLD AUTO: 44 % (ref 43–75)
NONHDLC SERPL-MCNC: 115 MG/DL
NRBC BLD AUTO-RTO: 0 /100 WBCS
PLATELET # BLD AUTO: 306 THOUSANDS/UL (ref 149–390)
PMV BLD AUTO: 9.6 FL (ref 8.9–12.7)
POTASSIUM SERPL-SCNC: 4.3 MMOL/L (ref 3.5–5.3)
PROT SERPL-MCNC: 7.5 G/DL (ref 6.4–8.4)
RBC # BLD AUTO: 4.96 MILLION/UL (ref 3.81–5.12)
SODIUM SERPL-SCNC: 138 MMOL/L (ref 135–147)
TRIGL SERPL-MCNC: 73 MG/DL
TSH SERPL DL<=0.05 MIU/L-ACNC: 3.23 UIU/ML (ref 0.45–4.5)
WBC # BLD AUTO: 6.02 THOUSAND/UL (ref 4.31–10.16)

## 2024-10-24 PROCEDURE — 80061 LIPID PANEL: CPT

## 2024-10-24 PROCEDURE — 85025 COMPLETE CBC W/AUTO DIFF WBC: CPT

## 2024-10-24 PROCEDURE — 36415 COLL VENOUS BLD VENIPUNCTURE: CPT

## 2024-10-24 PROCEDURE — 84443 ASSAY THYROID STIM HORMONE: CPT

## 2024-10-24 PROCEDURE — 80053 COMPREHEN METABOLIC PANEL: CPT

## 2024-10-30 ENCOUNTER — HOSPITAL ENCOUNTER (OUTPATIENT)
Dept: BONE DENSITY | Facility: HOSPITAL | Age: 64
Discharge: HOME | End: 2024-10-30
Attending: OBSTETRICS & GYNECOLOGY
Payer: MEDICARE

## 2024-10-30 VITALS — WEIGHT: 152 LBS | HEIGHT: 59 IN | BODY MASS INDEX: 30.64 KG/M2

## 2024-10-30 DIAGNOSIS — Z78.0 OSTEOPENIA AFTER MENOPAUSE: ICD-10-CM

## 2024-10-30 DIAGNOSIS — Z13.820 SCREENING FOR OSTEOPOROSIS: ICD-10-CM

## 2024-10-30 DIAGNOSIS — M85.80 OSTEOPENIA AFTER MENOPAUSE: ICD-10-CM

## 2024-10-30 PROCEDURE — 77080 DXA BONE DENSITY AXIAL: CPT

## 2024-11-05 DIAGNOSIS — I63.512 ACUTE ISCHEMIC LEFT MIDDLE CEREBRAL ARTERY (MCA) STROKE (HCC): ICD-10-CM

## 2024-11-05 DIAGNOSIS — I10 ESSENTIAL HYPERTENSION: ICD-10-CM

## 2024-11-05 DIAGNOSIS — E78.49 OTHER HYPERLIPIDEMIA: ICD-10-CM

## 2024-11-05 NOTE — TELEPHONE ENCOUNTER
Reason for call:   [x] Refill   [] Prior Auth  [] Other:     Office:   [x] PCP/Provider -   [] Specialty/Provider -     Medication: valsartan (DIOVAN) 40 mg tablet Take 1 tablet (40 mg total) by mouth daily,     omeprazole (PriLOSEC) 20 mg delayed release capsule Take 1 capsule (20 mg total) by mouth daily     atorvastatin (LIPITOR) 20 mg tablet Take 1 tablet (20 mg total) by mouth every evening       Pharmacy: [facility] - [address]      Does the patient have enough for 3 days?   [x] Yes   [] No - Send as HP to POD

## 2024-11-06 RX ORDER — VALSARTAN 40 MG/1
40 TABLET ORAL DAILY
Qty: 90 TABLET | Refills: 1 | Status: SHIPPED | OUTPATIENT
Start: 2024-11-06

## 2024-11-06 RX ORDER — ATORVASTATIN CALCIUM 20 MG/1
20 TABLET, FILM COATED ORAL EVERY EVENING
Qty: 90 TABLET | Refills: 1 | Status: SHIPPED | OUTPATIENT
Start: 2024-11-06

## 2024-12-12 ENCOUNTER — OFFICE VISIT (OUTPATIENT)
Dept: FAMILY MEDICINE CLINIC | Facility: CLINIC | Age: 64
End: 2024-12-12
Payer: MEDICARE

## 2024-12-12 VITALS
RESPIRATION RATE: 17 BRPM | OXYGEN SATURATION: 94 % | SYSTOLIC BLOOD PRESSURE: 128 MMHG | HEART RATE: 79 BPM | WEIGHT: 153 LBS | TEMPERATURE: 98.1 F | DIASTOLIC BLOOD PRESSURE: 76 MMHG | HEIGHT: 59 IN | BODY MASS INDEX: 30.84 KG/M2

## 2024-12-12 DIAGNOSIS — J01.90 ACUTE NON-RECURRENT SINUSITIS, UNSPECIFIED LOCATION: Primary | ICD-10-CM

## 2024-12-12 PROCEDURE — 99213 OFFICE O/P EST LOW 20 MIN: CPT | Performed by: PHYSICIAN ASSISTANT

## 2024-12-12 RX ORDER — AZITHROMYCIN 250 MG/1
TABLET, FILM COATED ORAL
Qty: 6 TABLET | Refills: 0 | Status: SHIPPED | OUTPATIENT
Start: 2024-12-12 | End: 2024-12-17

## 2024-12-12 NOTE — PROGRESS NOTES
"Name: Margi Duarte      : 1960      MRN: [id number]  Encounter Provider: Shalonda Hoff PA-C  Encounter Date: 2024   Encounter department: [facility]  :  Assessment & Plan  Acute non-recurrent sinusitis, unspecified location  Given Zithromax, take until finished.  Orders:  •  azithromycin (Zithromax) 250 mg tablet; Day 1 take 2 tablets, days 2-5 take 1 tablet.          Depression Screening and Follow-up Plan: Patient was screened for depression during today's encounter. They screened negative with a PHQ-2 score of 0.      History of Present Illness     Margi is here today after developing acute sinus symptoms last evening.      Review of Systems   Constitutional:  Negative for chills and fever.   HENT:  Negative for ear pain and sore throat.    Eyes:  Negative for pain and visual disturbance.   Respiratory:  Negative for cough and shortness of breath.    Cardiovascular:  Negative for chest pain and palpitations.   Gastrointestinal:  Negative for abdominal pain and vomiting.   Genitourinary:  Negative for dysuria and hematuria.   Musculoskeletal:  Negative for arthralgias and back pain.   Skin:  Negative for color change and rash.   Neurological:  Negative for seizures and syncope.   All other systems reviewed and are negative.      Objective   /76 (BP Location: Left arm, Patient Position: Sitting, Cuff Size: Standard)   Pulse 79   Temp 98.1 °F (36.7 °C) (Temporal)   Resp 17   Ht 4' 11\" (1.499 m)   Wt 69.4 kg (153 lb)   LMP  (LMP Unknown)   SpO2 94%   BMI 30.90 kg/m²      Physical Exam  Vitals reviewed.   Constitutional:       General: She is not in acute distress.     Appearance: She is well-developed. She is not diaphoretic.   HENT:      Head: Normocephalic and atraumatic.      Right Ear: Hearing, tympanic membrane, ear canal and external ear normal.      Left Ear: Hearing, tympanic membrane, ear canal and external ear normal.      Nose: Congestion and rhinorrhea present. "      Mouth/Throat:      Mouth: Mucous membranes are moist.      Pharynx: Oropharynx is clear. Uvula midline. No oropharyngeal exudate.   Eyes:      General: No scleral icterus.        Right eye: No discharge.         Left eye: No discharge.      Conjunctiva/sclera: Conjunctivae normal.   Neck:      Thyroid: No thyromegaly.      Vascular: No carotid bruit.   Cardiovascular:      Rate and Rhythm: Normal rate and regular rhythm.      Heart sounds: Normal heart sounds. No murmur heard.  Pulmonary:      Effort: Pulmonary effort is normal. No respiratory distress.      Breath sounds: Normal breath sounds. No wheezing.   Abdominal:      General: Bowel sounds are normal. There is no distension.      Palpations: Abdomen is soft. There is no mass.      Tenderness: There is no abdominal tenderness. There is no guarding or rebound.   Musculoskeletal:         General: No tenderness. Normal range of motion.      Cervical back: Neck supple.   Lymphadenopathy:      Cervical: No cervical adenopathy.   Skin:     General: Skin is warm and dry.      Findings: No erythema or rash.   Neurological:      Mental Status: She is alert and oriented to person, place, and time.   Psychiatric:         Behavior: Behavior normal.         Thought Content: Thought content normal.         Judgment: Judgment normal.

## 2024-12-20 ENCOUNTER — ANNUAL EXAM (OUTPATIENT)
Dept: OBGYN CLINIC | Facility: MEDICAL CENTER | Age: 64
End: 2024-12-20
Payer: MEDICARE

## 2024-12-20 VITALS
DIASTOLIC BLOOD PRESSURE: 70 MMHG | HEIGHT: 59 IN | BODY MASS INDEX: 30.74 KG/M2 | SYSTOLIC BLOOD PRESSURE: 120 MMHG | WEIGHT: 152.5 LBS

## 2024-12-20 DIAGNOSIS — Z12.31 ENCOUNTER FOR SCREENING MAMMOGRAM FOR MALIGNANT NEOPLASM OF BREAST: ICD-10-CM

## 2024-12-20 DIAGNOSIS — Z01.419 ENCOUNTER FOR WELL WOMAN EXAM WITH ROUTINE GYNECOLOGICAL EXAM: Primary | ICD-10-CM

## 2024-12-20 DIAGNOSIS — Z12.4 PAP SMEAR FOR CERVICAL CANCER SCREENING: ICD-10-CM

## 2024-12-20 PROCEDURE — G0145 SCR C/V CYTO,THINLAYER,RESCR: HCPCS | Performed by: OBSTETRICS & GYNECOLOGY

## 2024-12-20 PROCEDURE — G0101 CA SCREEN;PELVIC/BREAST EXAM: HCPCS | Performed by: OBSTETRICS & GYNECOLOGY

## 2024-12-20 NOTE — PROGRESS NOTES
"[facility] [address], [place], [place]    ASSESSMENT/PLAN: Margi Duarte is a 64 y.o.  who presents for annual gynecologic exam.  Routine well woman exam completed today.  Cervical Cancer Screening: Current ASCCP Guidelines reviewed. Last Pap: 12/15/2023 . Next Pap Due:   Mammo   Colonoscopy done 10/13/2020 in [place]  CC:  Annual Gynecologic Examination    HPI: Margi Duarte is a 64 y.o.  who presents for annual gynecologic examination.  For annual exam, doing well; no complaints    Gynecologic Exam    No GYN complaints; discussed DEXA results, consistent with osteoporosis. Discussed treatment options, information provided    The following portions of the patient's history were reviewed and updated as appropriate: allergies, current medications, past family history, past medical history, obstetric history, gynecologic history, past social history, past surgical history and problem list.    Review of Systems   Constitutional: Negative.    HENT: Negative.     Eyes: Negative.    Respiratory: Negative.     Cardiovascular: Negative.    Gastrointestinal: Negative.    Genitourinary: Negative.    Musculoskeletal: Negative.    All other systems reviewed and are negative.        Objective:  /70   Ht 4' 11\" (1.499 m)   Wt 69.2 kg (152 lb 8 oz)   LMP  (LMP Unknown)   BMI 30.80 kg/m²    Physical Exam  Vitals reviewed.   Constitutional:       General: She is not in acute distress.     Appearance: She is well-developed.   HENT:      Head: Normocephalic and atraumatic.      Nose: Nose normal.   Cardiovascular:      Rate and Rhythm: Normal rate.   Pulmonary:      Effort: Pulmonary effort is normal. No respiratory distress.   Chest:   Breasts:     Breasts are symmetrical.      Right: Normal. No mass, nipple discharge, skin change or tenderness.      Left: Normal. No mass, nipple discharge, skin change or tenderness.   Abdominal:      General: There is " no distension.      Palpations: Abdomen is soft. There is no mass.      Tenderness: There is no abdominal tenderness. There is no guarding or rebound.   Genitourinary:     General: Normal vulva.      Exam position: Lithotomy position.      Labia:         Right: No lesion.         Left: No lesion.       Urethra: No prolapse (urethral meatus normal).      Vagina: Normal. No vaginal discharge, erythema or bleeding.      Cervix: Normal.      Uterus: Normal.       Adnexa: Right adnexa normal and left adnexa normal.      Comments: Vulvar irritation  Musculoskeletal:         General: Normal range of motion.      Cervical back: Normal range of motion.   Lymphadenopathy:      Upper Body:      Right upper body: No supraclavicular, axillary or pectoral adenopathy.      Left upper body: No supraclavicular, axillary or pectoral adenopathy.      Lower Body: No left inguinal adenopathy.   Skin:     General: Skin is warm and dry.   Neurological:      Mental Status: She is alert and oriented to person, place, and time.   Psychiatric:         Behavior: Behavior normal.         Thought Content: Thought content normal.         Judgment: Judgment normal.

## 2024-12-27 LAB
LAB AP GYN PRIMARY INTERPRETATION: NORMAL
Lab: NORMAL

## 2024-12-30 ENCOUNTER — RESULTS FOLLOW-UP (OUTPATIENT)
Dept: OBGYN CLINIC | Facility: MEDICAL CENTER | Age: 64
End: 2024-12-30

## 2025-03-19 ENCOUNTER — OFFICE VISIT (OUTPATIENT)
Dept: FAMILY MEDICINE CLINIC | Facility: CLINIC | Age: 65
End: 2025-03-19
Payer: MEDICARE

## 2025-03-19 ENCOUNTER — RA CDI HCC (OUTPATIENT)
Dept: OTHER | Facility: HOSPITAL | Age: 65
End: 2025-03-19

## 2025-03-19 VITALS
TEMPERATURE: 97.2 F | BODY MASS INDEX: 30.56 KG/M2 | HEIGHT: 59 IN | DIASTOLIC BLOOD PRESSURE: 78 MMHG | RESPIRATION RATE: 18 BRPM | HEART RATE: 107 BPM | OXYGEN SATURATION: 96 % | SYSTOLIC BLOOD PRESSURE: 126 MMHG | WEIGHT: 151.6 LBS

## 2025-03-19 DIAGNOSIS — H61.21 IMPACTED CERUMEN OF RIGHT EAR: ICD-10-CM

## 2025-03-19 DIAGNOSIS — J02.9 PHARYNGITIS, UNSPECIFIED ETIOLOGY: Primary | ICD-10-CM

## 2025-03-19 PROCEDURE — 87636 SARSCOV2 & INF A&B AMP PRB: CPT | Performed by: INTERNAL MEDICINE

## 2025-03-19 PROCEDURE — 99214 OFFICE O/P EST MOD 30 MIN: CPT | Performed by: INTERNAL MEDICINE

## 2025-03-19 PROCEDURE — G2211 COMPLEX E/M VISIT ADD ON: HCPCS | Performed by: INTERNAL MEDICINE

## 2025-03-19 RX ORDER — AZITHROMYCIN 250 MG/1
TABLET, FILM COATED ORAL
Qty: 6 TABLET | Refills: 0 | Status: SHIPPED | OUTPATIENT
Start: 2025-03-19 | End: 2025-03-19 | Stop reason: CLARIF

## 2025-03-19 RX ORDER — AZITHROMYCIN 250 MG/1
TABLET, FILM COATED ORAL
Qty: 6 TABLET | Refills: 0 | Status: SHIPPED | OUTPATIENT
Start: 2025-03-19 | End: 2025-03-23

## 2025-03-19 NOTE — PROGRESS NOTES
Name: Margi Duarte      : 1960      MRN: [id number]  Encounter Provider: Margarita Root DO  Encounter Date: 3/19/2025   Encounter department: [facility]  :  Assessment & Plan  Pharyngitis, unspecified etiology    Orders:    azithromycin (ZITHROMAX) 250 mg tablet; Take 2 tablets today then 1 tablet daily x 4 days    COVID/FLU; Future  Swab for completion  Stay hydrated Mucinex and flonase daily  Take zapck Keep annual visit Monday   Impacted cerumen of right ear  Use debrox Pt has appt Monday for annual visit and recheck              History of Present Illness   HPI  Pt has pnd, cough and congestion for past few days Was in the [facility] visiting family and sxs started upon return No ill contacts there Felt chills but no fever noted Has hx of sinus issues and takes allergy meds daily Did add mucinex which has helped She has thick white mucous at times No n/v/d  Review of Systems   Constitutional:  Negative for chills and fever.   HENT:  Positive for congestion, postnasal drip and sore throat.    Eyes:  Negative for visual disturbance.   Respiratory:  Positive for cough. Negative for shortness of breath.    Cardiovascular: Negative.    Gastrointestinal: Negative.    Genitourinary: Negative.    Musculoskeletal:  Positive for myalgias.   Psychiatric/Behavioral:  Negative for sleep disturbance.      Past Medical History:   Diagnosis Date    Bladder stone     Hyperlipidemia     Stroke (HCC)     Left hand    Stroke aborted by administration of thrombolytic agent (HCC) 2019    Ureteral calculus, right      Past Surgical History:   Procedure Laterality Date    CARDIAC ELECTROPHYSIOLOGY PROCEDURE N/A 2024    Procedure: Cardiac loop recorder explant;  Surgeon: Joe Akhtar MD;  Location: [facility];  Service: Cardiology    CARDIAC LOOP RECORDER      CHOLECYSTECTOMY      FL RETROGRADE PYELOGRAM  2021    GALLBLADDER SURGERY      onset:     PA CYSTO/URETERO  W/LITHOTRIPSY &INDWELL STENT INSRT Left 12/17/2021    Procedure: CYSTOSCOPY URETEROSCOPY WITH LITHOTRIPSY HOLMIUM LASER, RETROGRADE PYELOGRAM BASKET STONE EXTRACTION AND INSERTION STENT URETERAL;  Surgeon: Thony Fuentes MD;  Location: [facility];  Service: Urology     Social History     Socioeconomic History    Marital status:      Spouse name: Not on file    Number of children: Not on file    Years of education: Not on file    Highest education level: Not on file   Occupational History    Not on file   Tobacco Use    Smoking status: Never    Smokeless tobacco: Never   Vaping Use    Vaping status: Never Used   Substance and Sexual Activity    Alcohol use: Yes     Comment: social    Drug use: Never    Sexual activity: Not Currently     Partners: Male     Birth control/protection: Post-menopausal, None   Other Topics Concern    Not on file   Social History Narrative    No living will     Social Drivers of Health     Financial Resource Strain: Low Risk  (4/17/2023)    Overall Financial Resource Strain (CARDIA)     Difficulty of Paying Living Expenses: Not hard at all   Food Insecurity: No Food Insecurity (5/22/2024)    Nursing - Inadequate Food Risk Classification     Worried About Running Out of Food in the Last Year: Never true     Ran Out of Food in the Last Year: Never true     Ran Out of Food in the Last Year: Not on file   Transportation Needs: No Transportation Needs (5/22/2024)    PRAPARE - Transportation     Lack of Transportation (Medical): No     Lack of Transportation (Non-Medical): No   Physical Activity: Not on file   Stress: Not on file   Social Connections: Not on file   Intimate Partner Violence: Not on file   Housing Stability: Low Risk  (5/22/2024)    Housing Stability Vital Sign     Unable to Pay for Housing in the Last Year: No     Number of Times Moved in the Last Year: 0     Homeless in the Last Year: No     Allergies   Allergen Reactions    Penicillin G Hives     Rash       Objective  "  /78   Pulse (!) 107   Temp (!) 97.2 °F (36.2 °C) (Temporal)   Resp 18   Ht 4' 11\" (1.499 m)   Wt 68.8 kg (151 lb 9.6 oz)   LMP  (LMP Unknown)   SpO2 96%   BMI 30.62 kg/m²      Physical Exam  Vitals and nursing note reviewed.   Constitutional:       General: She is not in acute distress.     Appearance: Normal appearance. She is not ill-appearing, toxic-appearing or diaphoretic.   HENT:      Head: Normocephalic and atraumatic.      Right Ear: There is impacted cerumen.      Nose: Rhinorrhea present.      Mouth/Throat:      Mouth: Mucous membranes are moist.   Eyes:      Extraocular Movements: Extraocular movements intact.      Pupils: Pupils are equal, round, and reactive to light.   Cardiovascular:      Rate and Rhythm: Normal rate.      Pulses: Normal pulses.      Heart sounds: Normal heart sounds.   Pulmonary:      Effort: Pulmonary effort is normal.      Breath sounds: Normal breath sounds.   Abdominal:      General: Bowel sounds are normal.      Palpations: Abdomen is soft.   Musculoskeletal:      Right lower leg: No edema.      Left lower leg: No edema.   Skin:     General: Skin is warm and dry.      Coloration: Skin is not jaundiced or pale.   Neurological:      General: No focal deficit present.      Mental Status: She is alert and oriented to person, place, and time. Mental status is at baseline.      Cranial Nerves: No cranial nerve deficit.      Sensory: No sensory deficit.   Psychiatric:         Mood and Affect: Mood normal.         Behavior: Behavior normal.         Thought Content: Thought content normal.         Judgment: Judgment normal.         "

## 2025-03-20 ENCOUNTER — RESULTS FOLLOW-UP (OUTPATIENT)
Dept: FAMILY MEDICINE CLINIC | Facility: CLINIC | Age: 65
End: 2025-03-20

## 2025-03-20 LAB
FLUAV RNA RESP QL NAA+PROBE: NEGATIVE
FLUBV RNA RESP QL NAA+PROBE: NEGATIVE
SARS-COV-2 RNA RESP QL NAA+PROBE: NEGATIVE

## 2025-03-25 ENCOUNTER — OFFICE VISIT (OUTPATIENT)
Dept: FAMILY MEDICINE CLINIC | Facility: CLINIC | Age: 65
End: 2025-03-25
Payer: MEDICARE

## 2025-03-25 VITALS
WEIGHT: 152.4 LBS | BODY MASS INDEX: 30.72 KG/M2 | OXYGEN SATURATION: 96 % | DIASTOLIC BLOOD PRESSURE: 84 MMHG | HEART RATE: 78 BPM | HEIGHT: 59 IN | SYSTOLIC BLOOD PRESSURE: 128 MMHG | TEMPERATURE: 98.4 F

## 2025-03-25 DIAGNOSIS — I10 ESSENTIAL HYPERTENSION: Primary | ICD-10-CM

## 2025-03-25 DIAGNOSIS — Z00.00 HEALTHCARE MAINTENANCE: ICD-10-CM

## 2025-03-25 DIAGNOSIS — J30.1 SEASONAL ALLERGIC RHINITIS DUE TO POLLEN: ICD-10-CM

## 2025-03-25 DIAGNOSIS — E78.49 OTHER HYPERLIPIDEMIA: ICD-10-CM

## 2025-03-25 PROCEDURE — G2211 COMPLEX E/M VISIT ADD ON: HCPCS | Performed by: PHYSICIAN ASSISTANT

## 2025-03-25 PROCEDURE — 99214 OFFICE O/P EST MOD 30 MIN: CPT | Performed by: PHYSICIAN ASSISTANT

## 2025-03-25 RX ORDER — FEXOFENADINE HCL 180 MG/1
180 TABLET ORAL DAILY
Qty: 30 TABLET | Refills: 5 | Status: SHIPPED | OUTPATIENT
Start: 2025-03-25

## 2025-03-25 NOTE — ASSESSMENT & PLAN NOTE
Will repeat labs before next visit in 6 months.  Orders:  •  Comprehensive metabolic panel; Future  •  Lipid panel; Future

## 2025-03-25 NOTE — ASSESSMENT & PLAN NOTE
Recommended that patient restart her Allegra.  Continue Flonase.  Orders:  •  fexofenadine (ALLEGRA) 180 MG tablet; Take 1 tablet (180 mg total) by mouth daily

## 2025-03-25 NOTE — ASSESSMENT & PLAN NOTE
Blood pressure is well-controlled.  Continue valsartan 40 mg daily.  Orders:  •  Comprehensive metabolic panel; Future

## 2025-03-25 NOTE — PROGRESS NOTES
Name: Margi Duarte      : 1960      MRN: [id number]  Encounter Provider: Jelly Phan PA-C  Encounter Date: 3/25/2025   Encounter department: [facility]  :  Assessment & Plan  Essential hypertension  Blood pressure is well-controlled.  Continue valsartan 40 mg daily.  Orders:  •  Comprehensive metabolic panel; Future    Seasonal allergic rhinitis due to pollen  Recommended that patient restart her Allegra.  Continue Flonase.  Orders:  •  fexofenadine (ALLEGRA) 180 MG tablet; Take 1 tablet (180 mg total) by mouth daily    Other hyperlipidemia  Will repeat labs before next visit in 6 months.  Orders:  •  Comprehensive metabolic panel; Future  •  Lipid panel; Future    Healthcare maintenance  Routine labs ordered to be completed prior to her well visit in the fall.  Orders:  •  Comprehensive metabolic panel; Future  •  CBC and differential; Future  •  Lipid panel; Future           History of Present Illness   Margi is a very pleasant 64-year-old female who is here today for routine follow-up.She denies any acute concerns or problems.  She does have a history of a stroke.  She is no longer following with cardiology.  She is doing well on all of her medications.  She is up-to-date with her routine labs.  She is up-to-date with mammograms and colon cancer screenings.  She was recently seen for sinusitis.  She admits that her symptoms are slowly improving.  She still has a lingering cough.  She has not been taking her Allegra.  She recently visited relatives who have animals, and she thinks this exacerbated her allergies.  She has been using her Flonase nasal spray and Mucinex.  She denies any fevers or chills.  She finished her antibiotic, which did help.  She tested negative for COVID and flu.      Review of Systems   Constitutional:  Negative for chills, diaphoresis, fatigue and fever.   HENT:  Positive for congestion and rhinorrhea. Negative for ear pain, postnasal drip, sneezing, sore throat  "and trouble swallowing.    Eyes:  Negative for pain and visual disturbance.   Respiratory:  Positive for cough. Negative for apnea, shortness of breath and wheezing.    Cardiovascular:  Negative for chest pain and palpitations.   Gastrointestinal:  Negative for abdominal pain, constipation, diarrhea, nausea and vomiting.   Genitourinary:  Negative for dysuria and hematuria.   Musculoskeletal:  Negative for arthralgias, gait problem and myalgias.   Neurological:  Negative for dizziness, syncope, weakness, light-headedness, numbness and headaches.   Psychiatric/Behavioral:  Negative for suicidal ideas. The patient is not nervous/anxious.        Objective   /84   Pulse 78   Temp 98.4 °F (36.9 °C)   Ht 4' 11\" (1.499 m)   Wt 69.1 kg (152 lb 6.4 oz)   LMP  (LMP Unknown)   SpO2 96%   BMI 30.78 kg/m²      Physical Exam  Vitals and nursing note reviewed.   Constitutional:       General: She is not in acute distress.     Appearance: She is well-developed. She is not diaphoretic.   HENT:      Head: Normocephalic and atraumatic.      Right Ear: Hearing, tympanic membrane, ear canal and external ear normal.      Left Ear: Hearing, tympanic membrane, ear canal and external ear normal.      Nose: Congestion and rhinorrhea present. No mucosal edema.      Mouth/Throat:      Pharynx: No oropharyngeal exudate or posterior oropharyngeal erythema.   Eyes:      Extraocular Movements: Extraocular movements intact.   Cardiovascular:      Rate and Rhythm: Normal rate and regular rhythm.      Heart sounds: Normal heart sounds. No murmur heard.     No friction rub. No gallop.   Pulmonary:      Effort: Pulmonary effort is normal. No respiratory distress.      Breath sounds: Normal breath sounds. No wheezing or rales.   Abdominal:      General: Bowel sounds are normal. There is no distension.      Palpations: Abdomen is soft.      Tenderness: There is no abdominal tenderness. There is no guarding.   Musculoskeletal:         " General: Normal range of motion.      Cervical back: Normal range of motion and neck supple.   Lymphadenopathy:      Cervical: No cervical adenopathy.   Skin:     General: Skin is warm and dry.      Findings: No rash.   Neurological:      Mental Status: She is alert and oriented to person, place, and time.   Psychiatric:         Behavior: Behavior normal.         Thought Content: Thought content normal.         Judgment: Judgment normal.

## 2025-04-24 DIAGNOSIS — I63.512 ACUTE ISCHEMIC LEFT MIDDLE CEREBRAL ARTERY (MCA) STROKE (HCC): ICD-10-CM

## 2025-04-24 DIAGNOSIS — E78.49 OTHER HYPERLIPIDEMIA: ICD-10-CM

## 2025-04-24 DIAGNOSIS — I10 ESSENTIAL HYPERTENSION: ICD-10-CM

## 2025-04-24 RX ORDER — OMEPRAZOLE 20 MG/1
20 CAPSULE, DELAYED RELEASE ORAL DAILY
Qty: 90 CAPSULE | Refills: 1 | Status: SHIPPED | OUTPATIENT
Start: 2025-04-24

## 2025-04-24 RX ORDER — ATORVASTATIN CALCIUM 20 MG/1
20 TABLET, FILM COATED ORAL EVERY EVENING
Qty: 90 TABLET | Refills: 1 | Status: SHIPPED | OUTPATIENT
Start: 2025-04-24

## 2025-04-24 RX ORDER — VALSARTAN 40 MG/1
40 TABLET ORAL DAILY
Qty: 90 TABLET | Refills: 1 | Status: SHIPPED | OUTPATIENT
Start: 2025-04-24

## 2025-04-24 NOTE — TELEPHONE ENCOUNTER
Reason for call:   [x] Refill   [] Prior Auth  [] Other:     Office:   [x] PCP/Provider - [facility]   [] Specialty/Provider -     Medication:   ~ atorvastatin (LIPITOR) 20 mg tablet - Take 1 tablet (20 mg total) by mouth every evening   ~ omeprazole (PriLOSEC) 20 mg delayed release capsule - Take 1 capsule (20 mg total) by mouth daily   ~ valsartan (DIOVAN) 40 mg tablet - Take 1 tablet (40 mg total) by mouth daily     Pharmacy:   [facility] - [place], [address]     Mail Away Pharmacy   Does the patient have enough for 10 days?   [x] Yes   [] No - Send as HP to POD

## 2025-06-29 ENCOUNTER — OFFICE VISIT (OUTPATIENT)
Dept: URGENT CARE | Facility: CLINIC | Age: 65
End: 2025-06-29
Payer: MEDICARE

## 2025-06-29 VITALS
RESPIRATION RATE: 18 BRPM | DIASTOLIC BLOOD PRESSURE: 84 MMHG | SYSTOLIC BLOOD PRESSURE: 124 MMHG | HEART RATE: 116 BPM | OXYGEN SATURATION: 94 % | TEMPERATURE: 98.2 F

## 2025-06-29 DIAGNOSIS — J32.9 SINOBRONCHITIS: Primary | ICD-10-CM

## 2025-06-29 DIAGNOSIS — J40 SINOBRONCHITIS: Primary | ICD-10-CM

## 2025-06-29 DIAGNOSIS — R05.1 ACUTE COUGH: ICD-10-CM

## 2025-06-29 PROCEDURE — 99203 OFFICE O/P NEW LOW 30 MIN: CPT

## 2025-06-29 PROCEDURE — G0463 HOSPITAL OUTPT CLINIC VISIT: HCPCS

## 2025-06-29 RX ORDER — PREDNISONE 10 MG/1
TABLET ORAL
Qty: 26 TABLET | Refills: 0 | Status: SHIPPED | OUTPATIENT
Start: 2025-06-29

## 2025-06-29 RX ORDER — BENZONATATE 100 MG/1
100 CAPSULE ORAL 3 TIMES DAILY PRN
Qty: 20 CAPSULE | Refills: 0 | Status: SHIPPED | OUTPATIENT
Start: 2025-06-29

## 2025-06-29 RX ORDER — AZITHROMYCIN 250 MG/1
TABLET, FILM COATED ORAL
Qty: 6 TABLET | Refills: 0 | Status: SHIPPED | OUTPATIENT
Start: 2025-06-29 | End: 2025-07-03

## 2025-06-29 NOTE — PROGRESS NOTES
[facility]        NAME: Margi Duarte is a 64 y.o. female  : 1960    MRN: [id number]  DATE: 2025  TIME: 11:35 AM    Assessment and Plan   Sinobronchitis [J32.9, J40]  1. Sinobronchitis  predniSONE 10 mg tablet    azithromycin (ZITHROMAX) 250 mg tablet      2. Acute cough  benzonatate (TESSALON PERLES) 100 mg capsule        Discussed with patient symptoms are most consistent with a viral illness.  Will treat with prednisone and Tessalon as needed for cough.  Patient is very concerned and states usually she gets an antibiotic to knock out these types of infections.  Will send over azithromycin.  Instructed patient to start azithromycin in the next 2 to 3 days for no improvement or worsening of symptoms.  Recommend supportive care with OTC Flonase, Mucinex and Tylenol.  Instructed patient to rest and increase fluid intake. Instructed patient to follow-up with PCP for no improvement or worsening of symptoms.  Patient educated on red flag symptoms and when to proceed to the ED.  Patient agreeable and understands current treatment plan.     Patient Instructions     Patient Instructions   Most colds are caused by viruses, which do not respond to antibiotics. Typically, viruses are self limiting and most people recover in 7-10 days.    Please start a Prednisone taper daily as directed.  Please take steroids with food and do not take any Ibuprofen or other NSAID containing medications as this may increase the risk for GI bleeding. You may take Tylenol if needed.       You may take Tessalon three times daily as needed for cough.     You may start Azithromycin daily for 5 days if symptoms do not improve or worsen over the next 2-3 days.    While sick, make sure you get lots of rest and increase your fluid intake.   You may use OTC nasal saline spray, Flonase, and Mucinex for mild congestion.   Take Tylenol for fever, mild pain, and/or body aches.  Perform salt water gargles, drink warm tea with  honey, and use throat lozenges and Chloraseptic spray for sore throat.    You can also apply warm compresses over your sinuses for any sinus pressure.     While you are sick, taking vitamins may help boost your immune system and potentially aid in recovery by supporting your body's natural defense mechanisms: Vitamin D3 2000 IU daily, Vitamin C 1000mg daily , and Multivitamin daily.    Antibiotics are medicines that treat bacterial infections by either killing bacteria or preventing them from growing. It is important to take the full course of your antibiotics as prescribed to kill the bacteria causing your infection. Stopping your antibiotics early could lead to reinfection and can also promote the spread of antibiotic resistant bacteria. Some antibiotics may cause certain side effects including: nausea, vomiting, diarrhea, bloating, indigestion, belly pain, and/or loss of appetite. Eating yogurt with live and active cultures and/or taking a probiotic and maintaining a healthy diet can help to reduce some of these side effects.     If your symptoms do not improve with our current treatment plan or worsen, please schedule an appointment with your PCP. If you develop any high or persistent fevers, chest pain, palpitations, shortness of breath, difficulty swallowing, decreased urine output, abdominal pain, dizziness or any other concerning symptoms, please proceed to the ED.       Follow up with PCP in 3-5 days.  Proceed to  ER if symptoms worsen.    Chief Complaint     Chief Complaint   Patient presents with   • Cold Like Symptoms     Sinus congestion cough 2 days          History of Present Illness       64-year-old female presents to the clinic for evaluation of cough x 2 days.  Patient reports the cough is strong and productive in nature.  She also reports associated symptoms including sinus congestion, postnasal drip, runny nose, sore throat and headache.  Patient denies any fevers, chills, chest tightness,  shortness of breath, wheezing, chest pain, palpitations, nausea, vomiting, diarrhea, body aches or dizziness.  She reports her symptoms have been gradually worsening.  She reports she has been taking OTC Mucinex with mild relief of symptoms.  Patient states when she gets sick like this she normally has to take an antibiotic to feel better.            Review of Systems   Review of Systems   Constitutional:  Negative for chills and fever.   HENT:  Positive for congestion, postnasal drip, rhinorrhea and sore throat. Negative for ear pain.    Respiratory:  Positive for cough (productive). Negative for chest tightness, shortness of breath and wheezing.    Cardiovascular:  Negative for chest pain and palpitations.   Gastrointestinal:  Negative for diarrhea, nausea and vomiting.   Musculoskeletal:  Negative for arthralgias and myalgias.   Neurological:  Positive for headaches. Negative for dizziness.   All other systems reviewed and are negative.        Current Medications     Current Medications[1]    Current Allergies     Allergies as of 06/29/2025 - Reviewed 06/29/2025   Allergen Reaction Noted   • Penicillin g Hives 09/01/2020            The following portions of the patient's history were reviewed and updated as appropriate: allergies, current medications, past family history, past medical history, past social history, past surgical history and problem list.     Past Medical History[2]    Past Surgical History[3]    Family History[4]      Medications have been verified.        Objective   /84   Pulse (!) 116   Temp 98.2 °F (36.8 °C)   Resp 18   LMP  (LMP Unknown)   SpO2 94%        Physical Exam     Physical Exam  Vitals and nursing note reviewed.   Constitutional:       Appearance: Normal appearance.   HENT:      Head: Normocephalic and atraumatic.      Right Ear: Tympanic membrane, ear canal and external ear normal.      Left Ear: Tympanic membrane, ear canal and external ear normal.      Nose: Congestion  and rhinorrhea present.      Right Sinus: No maxillary sinus tenderness or frontal sinus tenderness.      Left Sinus: No maxillary sinus tenderness or frontal sinus tenderness.      Mouth/Throat:      Mouth: Mucous membranes are moist.      Pharynx: Oropharynx is clear. Uvula midline. Postnasal drip present. No pharyngeal swelling, oropharyngeal exudate, posterior oropharyngeal erythema or uvula swelling.      Tonsils: No tonsillar exudate or tonsillar abscesses.     Cardiovascular:      Rate and Rhythm: Normal rate and regular rhythm.      Heart sounds: Normal heart sounds.   Pulmonary:      Effort: Pulmonary effort is normal.      Breath sounds: Normal breath sounds. No stridor. No wheezing, rhonchi or rales.   Lymphadenopathy:      Cervical: No cervical adenopathy.     Skin:     General: Skin is warm and dry.     Neurological:      General: No focal deficit present.      Mental Status: She is alert and oriented to person, place, and time.     Psychiatric:         Mood and Affect: Mood normal.         Behavior: Behavior normal.                          [1]    Current Outpatient Medications:   •  ascorbic acid (VITAMIN C) 500 mg tablet, Take 500 mg by mouth in the morning., Disp: , Rfl:   •  aspirin (ECOTRIN LOW STRENGTH) 81 mg EC tablet, Take 1 tablet (81 mg total) by mouth daily, Disp: 90 tablet, Rfl: 0  •  atorvastatin (LIPITOR) 20 mg tablet, Take 1 tablet (20 mg total) by mouth every evening, Disp: 90 tablet, Rfl: 1  •  azithromycin (ZITHROMAX) 250 mg tablet, Take 2 tablets today then 1 tablet daily x 4 days, Disp: 6 tablet, Rfl: 0  •  benzonatate (TESSALON PERLES) 100 mg capsule, Take 1 capsule (100 mg total) by mouth 3 (three) times a day as needed for cough, Disp: 20 capsule, Rfl: 0  •  CRANBERRY PO, Take 168 mg by mouth, Disp: , Rfl:   •  Dextromethorphan-guaiFENesin (MUCINEX DM PO), Take by mouth, Disp: , Rfl:   •  fexofenadine (ALLEGRA) 180 MG tablet, Take 1 tablet (180 mg total) by mouth daily, Disp: 30  tablet, Rfl: 5  •  fluticasone (FLONASE) 50 mcg/act nasal spray, 2 sprays into each nostril daily, Disp: 16 g, Rfl: 3  •  Melatonin 10 MG TABS, Take by mouth daily as needed, Disp: , Rfl:   •  Multiple Vitamins-Minerals (CENTRUM PO), Take by mouth, Disp: , Rfl:   •  omeprazole (PriLOSEC) 20 mg delayed release capsule, Take 1 capsule (20 mg total) by mouth daily, Disp: 90 capsule, Rfl: 1  •  predniSONE 10 mg tablet, Take 3 tablets BID x 2 days, Take 2 tablets BID x 2 days, Take 1 tablet BID x 2 days, Take 1 tablet daily x 2 days, Disp: 26 tablet, Rfl: 0  •  valsartan (DIOVAN) 40 mg tablet, Take 1 tablet (40 mg total) by mouth daily, Disp: 90 tablet, Rfl: 1  •  VITAMIN E PO, Take 184 mg by mouth, Disp: , Rfl: [2]  Past Medical History:  Diagnosis Date   • Bladder stone    • Hyperlipidemia    • Stroke (HCC)     Left hand   • Stroke aborted by administration of thrombolytic agent (HCC) 07/11/2019   • Ureteral calculus, right    [3]  Past Surgical History:  Procedure Laterality Date   • CARDIAC ELECTROPHYSIOLOGY PROCEDURE N/A 2/21/2024    Procedure: Cardiac loop recorder explant;  Surgeon: Joe Akhtar MD;  Location: [facility];  Service: Cardiology   • CARDIAC LOOP RECORDER     • CHOLECYSTECTOMY     • FL RETROGRADE PYELOGRAM  12/17/2021   • GALLBLADDER SURGERY      onset: 2011   • MS CYSTO/URETERO W/LITHOTRIPSY &INDWELL STENT INSRT Left 12/17/2021    Procedure: CYSTOSCOPY URETEROSCOPY WITH LITHOTRIPSY HOLMIUM LASER, RETROGRADE PYELOGRAM BASKET STONE EXTRACTION AND INSERTION STENT URETERAL;  Surgeon: Thony Fuentes MD;  Location: [facility];  Service: Urology   [4]  Family History  Problem Relation Name Age of Onset   • Stroke Mother          stroke syndrome   • Cancer Maternal Grandmother     • Stomach cancer Maternal Grandmother     • No Known Problems Sister     • No Known Problems Maternal Grandfather     • No Known Problems Paternal Grandmother     • No Known Problems Paternal Grandfather     • No  Known Problems Sister     • No Known Problems Maternal Aunt     • No Known Problems Maternal Aunt     • No Known Problems Maternal Aunt     • No Known Problems Maternal Aunt     • No Known Problems Maternal Aunt     • No Known Problems Maternal Aunt     • No Known Problems Paternal Aunt     • No Known Problems Paternal Aunt     • No Known Problems Paternal Aunt

## 2025-06-29 NOTE — PATIENT INSTRUCTIONS
Most colds are caused by viruses, which do not respond to antibiotics. Typically, viruses are self limiting and most people recover in 7-10 days.    Please start a Prednisone taper daily as directed.  Please take steroids with food and do not take any Ibuprofen or other NSAID containing medications as this may increase the risk for GI bleeding. You may take Tylenol if needed.       You may take Tessalon three times daily as needed for cough.     You may start Azithromycin daily for 5 days if symptoms do not improve or worsen over the next 2-3 days.    While sick, make sure you get lots of rest and increase your fluid intake.   You may use OTC nasal saline spray, Flonase, and Mucinex for mild congestion.   Take Tylenol for fever, mild pain, and/or body aches.  Perform salt water gargles, drink warm tea with honey, and use throat lozenges and Chloraseptic spray for sore throat.    You can also apply warm compresses over your sinuses for any sinus pressure.     While you are sick, taking vitamins may help boost your immune system and potentially aid in recovery by supporting your body's natural defense mechanisms: Vitamin D3 2000 IU daily, Vitamin C 1000mg daily , and Multivitamin daily.    Antibiotics are medicines that treat bacterial infections by either killing bacteria or preventing them from growing. It is important to take the full course of your antibiotics as prescribed to kill the bacteria causing your infection. Stopping your antibiotics early could lead to reinfection and can also promote the spread of antibiotic resistant bacteria. Some antibiotics may cause certain side effects including: nausea, vomiting, diarrhea, bloating, indigestion, belly pain, and/or loss of appetite. Eating yogurt with live and active cultures and/or taking a probiotic and maintaining a healthy diet can help to reduce some of these side effects.     If your symptoms do not improve with our current treatment plan or worsen, please  schedule an appointment with your PCP. If you develop any high or persistent fevers, chest pain, palpitations, shortness of breath, difficulty swallowing, decreased urine output, abdominal pain, dizziness or any other concerning symptoms, please proceed to the ED.

## (undated) DEVICE — CATH URETERAL 5FR X 70 CM FLEX TIP POLYUR BARD

## (undated) DEVICE — FIBER STD QUANTA 272 MICRON

## (undated) DEVICE — PACK TUR

## (undated) DEVICE — LUBRICANT SURGILUBE TUBE 4 OZ  FLIP TOP

## (undated) DEVICE — SPECIMEN CONTAINER STERILE PEEL PACK

## (undated) DEVICE — BASKET SPECIMEN RETRIVAL 1.9FR 120CM

## (undated) DEVICE — GLOVE SRG BIOGEL 7.5

## (undated) DEVICE — GUIDEWIRE STRGHT TIP 0.035 IN  SOLO PLUS

## (undated) DEVICE — ENDOSCOPIC VALVE WITH ADAPTER.: Brand: SURSEAL® II

## (undated) DEVICE — GUIDEWIRE STRGHT TIP 0.038 IN SOLO PLUS

## (undated) DEVICE — Device